# Patient Record
Sex: FEMALE | Race: WHITE | NOT HISPANIC OR LATINO | Employment: FULL TIME | URBAN - METROPOLITAN AREA
[De-identification: names, ages, dates, MRNs, and addresses within clinical notes are randomized per-mention and may not be internally consistent; named-entity substitution may affect disease eponyms.]

---

## 2017-01-17 ENCOUNTER — HOSPITAL ENCOUNTER (OUTPATIENT)
Dept: BONE DENSITY | Facility: HOSPITAL | Age: 52
Discharge: HOME | End: 2017-01-17

## 2017-01-17 ENCOUNTER — HOSPITAL ENCOUNTER (OUTPATIENT)
Dept: MAMMOGRAPHY | Facility: HOSPITAL | Age: 52
Discharge: HOME | End: 2017-01-17
Admitting: NURSE PRACTITIONER

## 2017-01-17 DIAGNOSIS — Z13.820 OSTEOPOROSIS SCREENING: ICD-10-CM

## 2017-01-17 DIAGNOSIS — Z12.31 VISIT FOR SCREENING MAMMOGRAM: ICD-10-CM

## 2017-01-17 PROCEDURE — 77063 BREAST TOMOSYNTHESIS BI: CPT

## 2017-01-17 PROCEDURE — 77063 BREAST TOMOSYNTHESIS BI: CPT | Performed by: RADIOLOGY

## 2017-01-17 PROCEDURE — G0202 SCR MAMMO BI INCL CAD: HCPCS

## 2017-01-17 PROCEDURE — 77067 SCR MAMMO BI INCL CAD: CPT | Performed by: RADIOLOGY

## 2017-01-17 PROCEDURE — 77080 DXA BONE DENSITY AXIAL: CPT

## 2017-01-17 PROCEDURE — 77080 DXA BONE DENSITY AXIAL: CPT | Performed by: RADIOLOGY

## 2017-02-16 ENCOUNTER — TRANSCRIBE ORDERS (OUTPATIENT)
Dept: NUTRITION | Facility: HOSPITAL | Age: 52
End: 2017-02-16

## 2017-02-16 DIAGNOSIS — K58.0 IRRITABLE BOWEL SYNDROME WITH DIARRHEA: Primary | ICD-10-CM

## 2017-03-10 ENCOUNTER — OFFICE VISIT (OUTPATIENT)
Dept: NUTRITION | Facility: HOSPITAL | Age: 52
End: 2017-03-10

## 2017-03-10 PROCEDURE — 97802 MEDICAL NUTRITION INDIV IN: CPT | Performed by: DIETITIAN, REGISTERED

## 2017-03-13 VITALS — WEIGHT: 168 LBS | HEIGHT: 65 IN | BODY MASS INDEX: 27.99 KG/M2

## 2017-03-13 NOTE — CONSULTS
Adult Outpatient Nutrition  Assessment/PES    Patient Name:  Bhargavi Mohr  YOB: 1965  MRN: [id number]    Assessment Date:  3/13/2017          General Info       03/13/17 1014    Today's Session    Person(s) attending today's session Patient     Services Used Today? No    General Information    How well do you speak English? very well    Are you able to read and write English? Yes    Lives With spouse    Last grade of school completed Bachelor's Degree    Name and relationship of caregiver (if applicable)  n/a    Is patient pregnant? no            Physical Findings       03/13/17 1015    Physical Findings/Assessment    Additional Documentation Physical Appearance (Group)    Physical Appearance    Overall Physical Appearance overweight              Nutritional Info/Activity       03/13/17 1031    Nutritional Information    Have you had weight changes? No    What is your desired body weight? 65.8 kg (145 lb)    Have you tried to lose weight before? Yes    List programs tried, date, and success Still losing weight; moderate diet to control diabetes, walking, yoga    What is your motivation to lose weight? control diabetes & overall health    Use of Supplements vitamins;minerals;other (see comments)   Calcium +D, MVI    History of eating disorder? No    What cultural diet influences are important for you to follow? n/a    Do you have difficulty chewing food? No    Who Prepares Meals self    List any food cravings/trigger foods you have chips & salsa, queso    List any food aversions none    How often during the day do you find yourself snacking? 1x/day    Food Behaviors Stress eater    How often do you eat out and where? 2x/week (Sushi, Mexican)    Do you use Food Assistance programs (WIC, food stamps, food bank)? no    Do you need information about Food Assistance programs? no    How many times do you drink milk per day? 0    How many times do you eat fruit per day? 3    How many times do you  eat vegetables per day? 3    How many times do you drink juice per day? 0    How many times do you eat candy/chocolates per day? 0   <1x/day    How many times do you eat baked goods per day? 0   <1x/day    How many times do you eat desserts per day? 0   <1x/day    How many times do you eat ice cream per day? 0    How many times do you eat snack foods per day? 0   <1x/day    How many diet sodas do you drink per day? 0    How many regular sodas do you drink per day? 0    How many times do you eat ethnic food per day? 1    How many times do you drink alcohol per day? --   <1x/day    How many times do you have caffeine per day? 2    How many servings of artificial sweetner do you have per day? 2    How many meals do you eat each day? 3    How many snacks do you eat each day? 1    What is the biggest challenge you have with your diet? Other (comment)   Stress and work - often not enough time to eat healthy    What type of support do you currently use to help you with your health issues? n/a    Enter everything you can remember eating in the last 24 hours (1 day) Breakfast: cream of wheat; turkey sausage, banana, cashew milk; Snack: tomato; Lunch: Grilled Chicken sandwich, potato salad; Dinner: salad (mixed greens, tomato, cucumber, feta cheese, olive oil)    Eating Environment    Eating environment Alone;Family    Physical Activity    Are you currently involved in an activity/exercise program?  Yes    Describe physical activity walking (10,000-14,000 steps per day)    How would you rank exercise as an important health lifestyle practice? 9            Home Nutrition Report       03/13/17 1113    Home Nutrition Report    Diet Healthful diet   Diabetes    Typical Food/Fluid Intake vegetables, lean protein, whole grains, fruit, nuts, dairy free products    Food Preferences healthful diet    Meal/Snack Patterns 3 meals, 1 snack; rarely skips meals    Supplemental Drinks/Foods/Additives none    Use of Vitamin/Mineral/Herbal  "Supplements Multivitamin, Calcium, vitamin D    Factors Affecting Nutritional Intake Factors    Reported GI Symptoms Diarrhea;Other (comment)   bloating            Estimated/Assessed Needs       03/13/17 1126    Calculation Measurements    Weight Used For Calculations 78.5 kg (173 lb)    Height Used for Calculations 1.651 m (5' 5\")    Estimated/Assessed Energy Needs    Energy Need Method Marquette-St Jeor    Age 51    RMR (Marquette-St. Jeor Equation) 1405.6    Activity Factors (Mar[place] St Jeor)  Confined to bed  1.2    Total estimated needs ([place] St. Jeor) 1405 x 1.2 = 1687 - 250 = 1437    Estimated Kcal Range  2201-2540 kcalories/day                Problem/Interventions:        Problem 1       03/13/17 1127    Nutrition Diagnoses Problem 1    Problem 1 Knowledge Deficit    Etiology (related to) Medical Diagnosis    Gastrointestinal IBS    Signs/Symptoms (evidenced by) Reported  Information Deficit   low FODMAP diet                    Intervention Goal       03/13/17 1128    Intervention Goal    General Provide information regarding MNT for treatment/condition   low FODMAP diet for IBS    PO Meet estimated needs   low FOPMAP elimination diet phase 1    Weight Appropriate weight loss              Comments:  Patient describes problems with bloating and diarrhea.  Wants to obtain information on the low FODMAP diet to help reduce IBS symptoms.  States that her MD gave her low FODMAP diet guidelines but found them difficult to follow.  Also, wants to continue to keep her blood sugars under control and continue to lose weight.  Discussed rationale for low FODMAP diet.  Patient able to select foods that are appropriate for elimination phase and create individualized meal plans using the plate method.  The instructional process also included nutrition label reading, meal planning, portion sizes, and restaurant nutrition.  Will continue her current exercise routine. Materials provided include low FODMAP elimination " guidelines, sample menu, recipes, and supporting nutrition education materials.   No barriers to learning.  Newest Vital Sign Health Literacy Assessment completed today. Per health assessment, patient has adequate health literacy.     Goals:  -Follow low FODMAP elimination phase 1 guidelines.  -Lose 0.5# of body weight per week.      Patient was provided with RD's contact information. Follow up visit is scheduled for 4/21/2017 at 3:15 p.m.  Thank you for this referral.      Electronically signed by:  Julieta Aviles RD  03/13/17 11:32 AM

## 2017-04-10 ENCOUNTER — OFFICE VISIT (OUTPATIENT)
Dept: NUTRITION | Facility: HOSPITAL | Age: 52
End: 2017-04-10

## 2017-04-10 VITALS — WEIGHT: 164 LBS | HEIGHT: 65 IN | BODY MASS INDEX: 27.32 KG/M2

## 2017-04-10 NOTE — PROGRESS NOTES
Adult Outpatient Nutrition  Assessment/PES    Patient Name:  Bhargavi Mohr  YOB: 1965  MRN: [id number]    Assessment Date:  4/10/2017                Nutritional Info/Activity       04/10/17 1521    Nutritional Information    Use of Supplements vitamins;minerals;other (see comments)   Calcium +D, MVI    Who Prepares Meals self    Enter everything you can remember eating in the last 24 hours (1 day) Breakfast: Gluten Free toast, fried eggs, 2 slices fernandez; Lunch: Roasted Chicken sandwich on gluten free bread, gonzales, tomato, lettuce; Snack: cheese, baked sweet potato chips; Dinner: crab cakes, wild rice, 1/2 cup green beans        Comments:  Patient attended nutrition counseling appointment.  States that weight is 164 lbs.  Goal completion as follows:    -Follow low FODMAP elimination phase 1 guidelines: 100%  -Lose 0.5# of body weight per week: 100%    Patient states that she followed the low FODMAP elimination guidelines and did not find that they helped her.  States that she has had a diarrhea episode about every 3 days and did not identify any food trends that may have caused these episodes.  Per 24 hour recall, the patient appears to be eating more fat than normal.  Recommended keeping a food diary over the next week to help identify foods that bother her.  Will bring food record to next MD appointment.  Discussed trying either a fat restricted diet or a fiber restricted diet to see if these would help.  Information given.  Briefly discussed low FODMAP challenge guidelines if patient would like to try the challenge phase.  Gave low FODMAP supplement, called ProNourish, as an option to try if she travels as well.  States that she has lost about 4 lbs over the past month even though she has been eating more kcalories.  RD contact information given.  Patient plans to follow up with MD first, then will call RD to schedule continued follow up appointment if needed.  Thank you again for this referral.       Electronically signed by:  Julieta Aviles RD  04/10/17 3:23 PM

## 2017-04-21 ENCOUNTER — APPOINTMENT (OUTPATIENT)
Dept: NUTRITION | Facility: HOSPITAL | Age: 52
End: 2017-04-21

## 2018-03-07 ENCOUNTER — TRANSCRIBE ORDERS (OUTPATIENT)
Dept: ADMINISTRATIVE | Facility: HOSPITAL | Age: 53
End: 2018-03-07

## 2018-03-07 DIAGNOSIS — Z12.31 VISIT FOR SCREENING MAMMOGRAM: Primary | ICD-10-CM

## 2018-04-05 ENCOUNTER — HOSPITAL ENCOUNTER (OUTPATIENT)
Dept: MAMMOGRAPHY | Facility: HOSPITAL | Age: 53
Discharge: HOME | End: 2018-04-05
Admitting: NURSE PRACTITIONER

## 2018-04-05 DIAGNOSIS — Z12.31 VISIT FOR SCREENING MAMMOGRAM: ICD-10-CM

## 2018-04-05 PROCEDURE — 77067 SCR MAMMO BI INCL CAD: CPT

## 2018-04-05 PROCEDURE — 77063 BREAST TOMOSYNTHESIS BI: CPT | Performed by: RADIOLOGY

## 2018-04-05 PROCEDURE — 77063 BREAST TOMOSYNTHESIS BI: CPT

## 2018-04-05 PROCEDURE — 77067 SCR MAMMO BI INCL CAD: CPT | Performed by: RADIOLOGY

## 2018-04-11 ENCOUNTER — HOSPITAL ENCOUNTER (OUTPATIENT)
Dept: ULTRASOUND IMAGING | Facility: HOSPITAL | Age: 53
Discharge: HOME | End: 2018-04-11
Admitting: NURSE PRACTITIONER

## 2018-04-11 DIAGNOSIS — R92.8 ABNORMAL MAMMOGRAM: ICD-10-CM

## 2018-04-11 PROCEDURE — 76642 ULTRASOUND BREAST LIMITED: CPT

## 2018-04-11 PROCEDURE — 76642 ULTRASOUND BREAST LIMITED: CPT | Performed by: RADIOLOGY

## 2018-12-12 ENCOUNTER — TRANSCRIBE ORDERS (OUTPATIENT)
Dept: LAB | Facility: HOSPITAL | Age: 53
End: 2018-12-12

## 2018-12-12 ENCOUNTER — LAB (OUTPATIENT)
Dept: LAB | Facility: HOSPITAL | Age: 53
End: 2018-12-12

## 2018-12-12 DIAGNOSIS — N83.209 CYST OF OVARY, UNSPECIFIED LATERALITY: Primary | ICD-10-CM

## 2018-12-12 DIAGNOSIS — N83.209 CYST OF OVARY, UNSPECIFIED LATERALITY: ICD-10-CM

## 2018-12-12 LAB — CANCER AG125 SERPL QL: 8.2 U/ML (ref 0–30.2)

## 2018-12-12 PROCEDURE — 86304 IMMUNOASSAY TUMOR CA 125: CPT

## 2018-12-12 PROCEDURE — 36415 COLL VENOUS BLD VENIPUNCTURE: CPT

## 2018-12-20 ENCOUNTER — APPOINTMENT (OUTPATIENT)
Dept: PREADMISSION TESTING | Facility: HOSPITAL | Age: 53
End: 2018-12-20

## 2018-12-20 LAB
B-HCG UR QL: NEGATIVE
BACTERIA UR QL AUTO: NORMAL /HPF
BILIRUB UR QL STRIP: NEGATIVE
CLARITY UR: CLEAR
COLOR UR: YELLOW
DEPRECATED RDW RBC AUTO: 43.8 FL (ref 37–54)
ERYTHROCYTE [DISTWIDTH] IN BLOOD BY AUTOMATED COUNT: 13.3 % (ref 11.3–14.5)
GLUCOSE UR STRIP-MCNC: NEGATIVE MG/DL
HCT VFR BLD AUTO: 40.6 % (ref 34.5–44)
HGB BLD-MCNC: 13.3 G/DL (ref 11.5–15.5)
HGB UR QL STRIP.AUTO: NEGATIVE
HYALINE CASTS UR QL AUTO: NORMAL /LPF
KETONES UR QL STRIP: NEGATIVE
LEUKOCYTE ESTERASE UR QL STRIP.AUTO: ABNORMAL
MCH RBC QN AUTO: 29.8 PG (ref 27–31)
MCHC RBC AUTO-ENTMCNC: 32.8 G/DL (ref 32–36)
MCV RBC AUTO: 91 FL (ref 80–99)
NITRITE UR QL STRIP: NEGATIVE
PH UR STRIP.AUTO: 6 [PH] (ref 5–8)
PLATELET # BLD AUTO: 289 10*3/MM3 (ref 150–450)
PMV BLD AUTO: 10.6 FL (ref 6–12)
POTASSIUM BLD-SCNC: 3.9 MMOL/L (ref 3.5–5.5)
PROT UR QL STRIP: NEGATIVE
RBC # BLD AUTO: 4.46 10*6/MM3 (ref 3.89–5.14)
RBC # UR: NORMAL /HPF
REF LAB TEST METHOD: NORMAL
SP GR UR STRIP: 1.01 (ref 1–1.03)
SQUAMOUS #/AREA URNS HPF: NORMAL /HPF
UROBILINOGEN UR QL STRIP: ABNORMAL
WBC NRBC COR # BLD: 10.13 10*3/MM3 (ref 3.5–10.8)
WBC UR QL AUTO: NORMAL /HPF

## 2018-12-20 PROCEDURE — 84132 ASSAY OF SERUM POTASSIUM: CPT | Performed by: OBSTETRICS & GYNECOLOGY

## 2018-12-20 PROCEDURE — 85027 COMPLETE CBC AUTOMATED: CPT | Performed by: OBSTETRICS & GYNECOLOGY

## 2018-12-20 PROCEDURE — 36415 COLL VENOUS BLD VENIPUNCTURE: CPT

## 2018-12-20 PROCEDURE — 93010 ELECTROCARDIOGRAM REPORT: CPT | Performed by: INTERNAL MEDICINE

## 2018-12-20 PROCEDURE — 81001 URINALYSIS AUTO W/SCOPE: CPT | Performed by: OBSTETRICS & GYNECOLOGY

## 2018-12-20 PROCEDURE — 81025 URINE PREGNANCY TEST: CPT | Performed by: OBSTETRICS & GYNECOLOGY

## 2018-12-20 PROCEDURE — 93005 ELECTROCARDIOGRAM TRACING: CPT

## 2018-12-28 ENCOUNTER — LAB REQUISITION (OUTPATIENT)
Dept: LAB | Facility: HOSPITAL | Age: 53
End: 2018-12-28

## 2018-12-28 DIAGNOSIS — N83.291 OTHER OVARIAN CYST, RIGHT SIDE: ICD-10-CM

## 2018-12-28 PROCEDURE — 88305 TISSUE EXAM BY PATHOLOGIST: CPT | Performed by: OBSTETRICS & GYNECOLOGY

## 2018-12-31 LAB
CYTO UR: NORMAL
LAB AP CASE REPORT: NORMAL
LAB AP CLINICAL INFORMATION: NORMAL
PATH REPORT.FINAL DX SPEC: NORMAL
PATH REPORT.GROSS SPEC: NORMAL

## 2019-02-06 ENCOUNTER — LAB (OUTPATIENT)
Dept: LAB | Facility: HOSPITAL | Age: 54
End: 2019-02-06

## 2019-02-06 ENCOUNTER — TRANSCRIBE ORDERS (OUTPATIENT)
Dept: LAB | Facility: HOSPITAL | Age: 54
End: 2019-02-06

## 2019-02-06 DIAGNOSIS — R10.9 ABDOMINAL PAIN, UNSPECIFIED ABDOMINAL LOCATION: Primary | ICD-10-CM

## 2019-02-06 DIAGNOSIS — R10.9 ABDOMINAL PAIN, UNSPECIFIED ABDOMINAL LOCATION: ICD-10-CM

## 2019-02-06 LAB
BASOPHILS # BLD AUTO: 0.03 10*3/MM3 (ref 0–0.2)
BASOPHILS NFR BLD AUTO: 0.3 % (ref 0–1)
BILIRUB UR QL STRIP: NEGATIVE
CLARITY UR: CLEAR
COLOR UR: YELLOW
DEPRECATED RDW RBC AUTO: 44.8 FL (ref 37–54)
EOSINOPHIL # BLD AUTO: 0.4 10*3/MM3 (ref 0–0.3)
EOSINOPHIL NFR BLD AUTO: 4.5 % (ref 0–3)
ERYTHROCYTE [DISTWIDTH] IN BLOOD BY AUTOMATED COUNT: 13.5 % (ref 11.3–14.5)
GLUCOSE UR STRIP-MCNC: NEGATIVE MG/DL
HCT VFR BLD AUTO: 39.5 % (ref 34.5–44)
HGB BLD-MCNC: 12.7 G/DL (ref 11.5–15.5)
HGB UR QL STRIP.AUTO: NEGATIVE
IMM GRANULOCYTES # BLD AUTO: 0.02 10*3/MM3 (ref 0–0.03)
IMM GRANULOCYTES NFR BLD AUTO: 0.2 % (ref 0–0.6)
KETONES UR QL STRIP: NEGATIVE
LEUKOCYTE ESTERASE UR QL STRIP.AUTO: NEGATIVE
LYMPHOCYTES # BLD AUTO: 2.35 10*3/MM3 (ref 0.6–4.8)
LYMPHOCYTES NFR BLD AUTO: 26.3 % (ref 24–44)
MCH RBC QN AUTO: 29.1 PG (ref 27–31)
MCHC RBC AUTO-ENTMCNC: 32.2 G/DL (ref 32–36)
MCV RBC AUTO: 90.4 FL (ref 80–99)
MONOCYTES # BLD AUTO: 0.43 10*3/MM3 (ref 0–1)
MONOCYTES NFR BLD AUTO: 4.8 % (ref 0–12)
NEUTROPHILS # BLD AUTO: 5.72 10*3/MM3 (ref 1.5–8.3)
NEUTROPHILS NFR BLD AUTO: 63.9 % (ref 41–71)
NITRITE UR QL STRIP: NEGATIVE
PH UR STRIP.AUTO: 6 [PH] (ref 5–8)
PLATELET # BLD AUTO: 277 10*3/MM3 (ref 150–450)
PMV BLD AUTO: 10.5 FL (ref 6–12)
PROT UR QL STRIP: NEGATIVE
RBC # BLD AUTO: 4.37 10*6/MM3 (ref 3.89–5.14)
SP GR UR STRIP: 1.01 (ref 1–1.03)
UROBILINOGEN UR QL STRIP: NORMAL
WBC NRBC COR # BLD: 8.95 10*3/MM3 (ref 3.5–10.8)

## 2019-02-06 PROCEDURE — 81003 URINALYSIS AUTO W/O SCOPE: CPT

## 2019-02-06 PROCEDURE — 85025 COMPLETE CBC W/AUTO DIFF WBC: CPT

## 2019-02-06 PROCEDURE — 36415 COLL VENOUS BLD VENIPUNCTURE: CPT

## 2019-04-08 ENCOUNTER — TRANSCRIBE ORDERS (OUTPATIENT)
Dept: ADMINISTRATIVE | Facility: HOSPITAL | Age: 54
End: 2019-04-08

## 2019-04-08 DIAGNOSIS — Z12.31 VISIT FOR SCREENING MAMMOGRAM: Primary | ICD-10-CM

## 2019-05-15 ENCOUNTER — HOSPITAL ENCOUNTER (OUTPATIENT)
Dept: MAMMOGRAPHY | Facility: HOSPITAL | Age: 54
Discharge: HOME | End: 2019-05-15
Admitting: NURSE PRACTITIONER

## 2019-05-15 DIAGNOSIS — Z12.31 VISIT FOR SCREENING MAMMOGRAM: ICD-10-CM

## 2019-05-15 PROCEDURE — 77067 SCR MAMMO BI INCL CAD: CPT | Performed by: RADIOLOGY

## 2019-05-15 PROCEDURE — 77063 BREAST TOMOSYNTHESIS BI: CPT

## 2019-05-15 PROCEDURE — 77063 BREAST TOMOSYNTHESIS BI: CPT | Performed by: RADIOLOGY

## 2019-05-15 PROCEDURE — 77067 SCR MAMMO BI INCL CAD: CPT

## 2020-07-16 ENCOUNTER — OFFICE VISIT (OUTPATIENT)
Dept: GYNECOLOGIC ONCOLOGY | Facility: CLINIC | Age: 55
End: 2020-07-16

## 2020-07-16 VITALS
BODY MASS INDEX: 27.66 KG/M2 | HEIGHT: 65 IN | TEMPERATURE: 99.3 F | DIASTOLIC BLOOD PRESSURE: 70 MMHG | WEIGHT: 166 LBS | HEART RATE: 79 BPM | RESPIRATION RATE: 14 BRPM | SYSTOLIC BLOOD PRESSURE: 154 MMHG | OXYGEN SATURATION: 97 %

## 2020-07-16 DIAGNOSIS — N87.1 DYSPLASIA OF CERVIX, HIGH GRADE CIN 2: Primary | ICD-10-CM

## 2020-07-16 PROCEDURE — 57452 EXAM OF CERVIX W/SCOPE: CPT | Performed by: OBSTETRICS & GYNECOLOGY

## 2020-07-16 PROCEDURE — 99205 OFFICE O/P NEW HI 60 MIN: CPT | Performed by: OBSTETRICS & GYNECOLOGY

## 2020-07-16 RX ORDER — ALPRAZOLAM 0.25 MG/1
0.25 TABLET ORAL 3 TIMES DAILY PRN
COMMUNITY
Start: 2020-06-15 | End: 2022-07-02

## 2020-07-16 RX ORDER — LISINOPRIL AND HYDROCHLOROTHIAZIDE 25; 20 MG/1; MG/1
1 TABLET ORAL EVERY MORNING
COMMUNITY
Start: 2020-07-01 | End: 2022-07-02

## 2020-07-16 RX ORDER — FLUOXETINE HYDROCHLORIDE 20 MG/1
20 CAPSULE ORAL DAILY
COMMUNITY
Start: 2020-07-05

## 2020-07-16 RX ORDER — ESZOPICLONE 3 MG/1
3 TABLET, FILM COATED ORAL NIGHTLY PRN
COMMUNITY
Start: 2020-06-15 | End: 2020-09-11

## 2020-07-16 RX ORDER — NORETHINDRONE ACETATE AND ETHINYL ESTRADIOL, ETHINYL ESTRADIOL AND FERROUS FUMARATE 1MG-10(24)
1 KIT ORAL DAILY
COMMUNITY
Start: 2020-06-24 | End: 2020-08-03 | Stop reason: HOSPADM

## 2020-07-16 RX ORDER — ROSUVASTATIN CALCIUM 40 MG/1
40 TABLET, COATED ORAL DAILY
COMMUNITY
Start: 2020-04-18

## 2020-07-16 RX ORDER — VALACYCLOVIR HYDROCHLORIDE 1 G/1
2000 TABLET, FILM COATED ORAL 2 TIMES DAILY PRN
COMMUNITY
Start: 2020-05-08

## 2020-07-16 NOTE — PROGRESS NOTES
Bhargavi Mohr  [id number]  1965      Reason for visit: Cervical dysplasia    Consultation:  Patient is being seen at the request of Shelly Bauman    History of present illness:  The patient is a 54 y.o. year old female who presents today for treatment and evaluation of the above issues.    Patient has a longstanding history of abnormal Pap smear.  She underwent cervical conization at the age of 30.  She had an ASCUS Pap smear which was negative high risk 16/18, but patient underwent colposcopy with directed biopsies and subsequently saw Shelly Bauman to discuss the results.  Endocervical curetting showed at least high-grade squamous intraepithelial lesion (EDMUND-2) and cervical biopsy at 9:00 showed a low-grade squamous intraepithelial lesion (EDMUND-1 N1).  In the comments that said that the findings correlated with the patient's history of abnormal Pap smear.  At noted evaluation of the endocervical curettage was limited by scant tissue and tissue fragmentation and a more serious lesion could not be entirely excluded.    Today, patient notes no symptoms.  She is postmenopausal as of the age 46.  She is a never smoker.  She has questions about definitive management.    For new patients, [facility] intake form from today was reviewed and confirmed.    OBGYN History:  She is a .  She does use HRT. She does have a history of abnormal pap smears.      Oncologic History:   No history exists.         Past Medical History:   Diagnosis Date   • Anxiety    • Diabetes (CMS/HCC)    • Diverticulitis    • History of right salpingo-oophorectomy 2018   • Hypertension    • Melanoma (CMS/HCC)    • Positive TB test        Past Surgical History:   Procedure Laterality Date   • APPENDECTOMY     • CERVICAL CONIZATION     • SKIN CANCER EXCISION      melanoma   • TONSILLECTOMY AND ADENOIDECTOMY         MEDICATIONS: The current medication list was reviewed with the patient and updated in the EMR this date per the Medical Assistant.  Medication dosages and frequencies were confirmed to be accurate.      Allergies:  is allergic to codeine and percocet [oxycodone-acetaminophen].    Social History:   Social History     Socioeconomic History   • Marital status:      Spouse name: Not on file   • Number of children: 2   • Years of education: Not on file   • Highest education level: Not on file   Tobacco Use   • Smoking status: Former Smoker     Packs/day: 1.00     Years: 20.00     Pack years: 20.00   Substance and Sexual Activity   • Drug use: Never   • Sexual activity: Defer       Family History:    Family History   Problem Relation Age of Onset   • Heart disease Mother    • Colon cancer Father    • Breast cancer Neg Hx    • Ovarian cancer Neg Hx        Health Maintenance:    Health Maintenance   Topic Date Due   • ANNUAL PHYSICAL  08/23/1969   • TDAP/TD VACCINES (1 - Tdap) 08/23/1977   • ZOSTER VACCINE (1 of 2) 08/23/2016   • HEPATITIS C SCREENING  12/20/2018   • PAP SMEAR  12/20/2018   • COLONOSCOPY  12/20/2018   • INFLUENZA VACCINE  08/01/2020   • MAMMOGRAM  05/15/2021     Review of Systems   Constitutional: Negative for appetite change, chills, fatigue, fever and unexpected weight change.   HENT: Negative for congestion, hearing loss and sore throat.    Eyes: Negative for redness and visual disturbance.   Respiratory: Negative for cough, shortness of breath and wheezing.    Cardiovascular: Negative for chest pain, palpitations and leg swelling.   Gastrointestinal: Negative for abdominal distention, abdominal pain, blood in stool, constipation, diarrhea, nausea and vomiting.   Endocrine: Negative.  Negative for cold intolerance and heat intolerance (Hot flashes).   Genitourinary: Negative for difficulty urinating, dyspareunia, dysuria, frequency, genital sores, hematuria, pelvic pain, urgency, vaginal bleeding, vaginal discharge and vaginal pain.   Musculoskeletal: Negative for arthralgias, back pain, gait problem and joint swelling.  "  Skin: Negative for rash and wound.   Allergic/Immunologic: Negative for food allergies and immunocompromised state.   Neurological: Negative for dizziness, seizures, syncope, weakness, light-headedness, numbness and headaches.   Hematological: Negative for adenopathy. Does not bruise/bleed easily.   Psychiatric/Behavioral: Positive for dysphoric mood and sleep disturbance. Negative for confusion. The patient is nervous/anxious.      Physical Exam    Vitals:    07/16/20 1259   BP: 154/70   Pulse: 79   Resp: 14   Temp: 99.3 °F (37.4 °C)   TempSrc: Temporal   SpO2: 97%   Weight: 75.3 kg (166 lb)   Height: 165.1 cm (65\")   PainSc: 0-No pain       Body mass index is 27.62 kg/m².    Wt Readings from Last 3 Encounters:   07/16/20 75.3 kg (166 lb)   04/10/17 74.4 kg (164 lb)   03/13/17 76.2 kg (168 lb)         GENERAL: Alert, well-appearing female appearing her stated age who is in no apparent distress.   HEENT: Sclera anicteric. Head normocephalic, atraumatic. Mucus membranes moist.   NECK: Trachea midline, supple, without masses.  No thyromegaly.   BREASTS: Deferred  CARDIOVASCULAR: Normal rate, regular rhythm, no murmurs, rubs, or gallops.  No peripheral edema.  RESPIRATORY: Clear to auscultation bilaterally, normal respiratory effort  BACK:  No CVA tenderness, no vertebral tenderness on palpation  GASTROINTESTINAL:  Abdomen is soft, non-tender, non-distended, no rebound or guarding, no masses, or hernias. No HSM.   SKIN:  Warm, dry, well-perfused.  All visible areas intact.  No rashes, lesions, ulcers.  PSYCHIATRIC: AO x3, with appropriate affect, normal thought processes.  NEUROLOGIC: No focal deficits.  Moves extremities well.  MUSCULOSKELETAL: Normal gait and station.   EXTREMITIES:   No cyanosis, clubbing, symmetric.  LYMPHATICS:  No cervical or inguinal adenopathy noted.     PELVIC exam:    External genitalia are free from lesion. On speculum examination, the cervix was free from lesion. On bimanual examination " no mass was appreciated.  Uterus was normal in size and shape. There is no cervical motion or uterine tenderness. No cervical mass was palpated. Parametria were smooth. Rectovaginal exam was deferred.     ECOG PS 0    PROCEDURES:  After obtaining informed consent, colposcopy was performed of cervix and vagina.  3% acetic acid was applied.  Colposcopy was adequate.  Findings were remarkable for acetowhite changes and focal mosaicism at cervical os.  Biopsy was not performed.  Adequate hemostasis was noted at the end of the procedure.  Procedure was well-tolerated.      Diagnostic Data:      No Images in the past 120 days found..    Lab Results   Component Value Date    WBC 8.95 02/06/2019    HGB 12.7 02/06/2019    HCT 39.5 02/06/2019    MCV 90.4 02/06/2019     02/06/2019    NEUTROABS 5.72 02/06/2019    K 3.9 12/20/2018     Lab Results   Component Value Date     8.2 12/12/2018           Assessment/Plan   This is a 54 y.o. woman with remote history of cervical dysplasia requiring cold knife conization, now with recurrent abnormal Pap smear biopsy-proven EDMUND-2.  Prior right salpingo-oophorectomy.  Encounter Diagnosis   Name Primary?   • Dysplasia of cervix, high grade EDMUND 2 Yes     Options of repeat cervical conization versus definitive management with hysterectomy were discussed.  Patient strongly desires definitive management.  Patient was consented for robotic assisted total laparoscopic hysterectomy, left salpingo-oophorectomy.      Patient has history of anesthesia induced nausea and vomiting.  She has history of narcotic induced nausea and intolerance.  Ultram was discussed as a postoperative pain medication.    Risks and benefits of surgery were discussed.  This included, but was not limited to, infection and bleeding like when the skin is cut; damage to surrounding structures; and incisional complications.  Risk of DVT was addressed for major surgeries.  Standard of care efforts to minimize these  risks were reviewed.  Typical hospital stay and recovery were discussed as well as post-procedure precautions.  Surgical implications of chronic illnesses on recovery and surgical outcome were reviewed.     Pain medication regimen for postoperative care was discussed.  Typical regimen and avoidance of narcotics was discussed.  Patient was educated that other factors, such as existing narcotic use, can impact postoperative pain management.      Patient verbalized understanding of the plan including the risks and benefits.  Appropriate perioperative testing including laboratory evaluation, EKG as clinically indicated, chest x-ray as clinically indicated, and preadmission evaluation were all ordered as a part of this patient's care.    Pain assessment was performed today as a part of patient’s care.  For patients with pain related to surgery, gynecologic malignancy or cancer treatment, the plan is as noted in the assessment/plan.  For patients with pain not related to these issues, they are to seek any further needed care from a more appropriate provider, such as PCP.      No orders of the defined types were placed in this encounter.      FOLLOW UP: Surgery    Electronically Signed by: Ne Marmolejo MD  Date: 7/16/2020

## 2020-07-21 DIAGNOSIS — N87.1 DYSPLASIA OF CERVIX, HIGH GRADE CIN 2: Primary | ICD-10-CM

## 2020-07-21 RX ORDER — CELECOXIB 100 MG/1
200 CAPSULE ORAL
Status: CANCELLED | OUTPATIENT
Start: 2020-07-21

## 2020-07-21 NOTE — PROGRESS NOTES
Laparoscopic Surgery Instructions            Bhargavi Mohr  [id number]  1965      SURGEON:  Ne Marmolejo MD    Surgery Coordinator: Graciela Bauer  If you have any questions before or after surgery please call.  [phone]       Appointment    1. You have COVID testing on 07/31/2020 at 3:00 PM. This is located at [address] on the corner of [facility]. It is a drive through [facility].    2. You have pre-admission testing (PAT) appointment on 07/31/2020  at 3:30 PM.  You will need to be at hospital registration 10 minutes before that time. The registration department is located in the long hallway between the [address] buildings.If your PAT appointment is on Sunday, please enter through the Emergency Department.     3.  Your surgery has been scheduled on 08/03/2020.  You will need to be at the [address] [place] [address] second floor surgery registration on that day at 5:45 AM. .    The Day(s) Before Surgery     ·  Nothing by mouth after midnight on 08/02/2020.    · Plan to have someone take you home from the hospital.    · Do not use any products that contain nicotine or tobacco, such as cigarettes and e-cigarettes. These can delay healing after surgery. If you need help quitting, ask your health care provider.    ·  Do not take vitamins or aspirin one week before surgery ( if applicable).  On the morning of your surgery, you may take you prescription medications with a sip of water, unless told otherwise by your provider.  Bring them with you to the hospital (Diabetic patient should bring insulin if instructed by the managing physician). If you are taking a blood thinner ( Eliquis, Coumadin, Xarelto, Lovenox, Asprin, Heparin, etc.) please have the provider that manages this instruct you on when to stop taking prior to surgery.     · If you are feeling sick, have a fever or cough and have seen your PCP let our office know 48 hours prior to surgery. It may be subject to rescheduling.             What can I expect after the procedure?    A normal length of stay for laparoscopic procedures can be same day or up to 23 hours.     After the procedure, it is common to have:  · Pain.  · Soreness and numbness in your incision areas.  · Vaginal bleeding and discharge up to 6 weeks after surgery.  · Constipation.  · Temporary change in bladder function.  · Feelings of sadness or other emotions.  · Small amounts of clear drainage from incisions  · If you are discharged with an abdominal binder, this is to be used as needed for incisional comfort. You may choose to discontinue use at any time.      Follow these instructions at home:  Medicines    · Please take any medications that have been prescribed after your surgery, you may take over the counter Tylenol and Ibuprofen, unless told otherwise by your provider.   · Please take your stool softener as prescribed. If you do not have a bowel movement within 24 hours following surgery try a laxative (milk of magnesia) or you may take Juani-Lax.    Activity    · Return to your normal activities as told by your health care provider.   · You may be told to take short walks every day and go farther each time.  · Do not lift anything that is heavier than 20 lbs.      General instructions    · Do not put anything in your vagina for 6 weeks after your surgery or as told by your health care provider. This includes tampons and douches.  · Do not have sex until your health care provider says you can.  · Do not take baths, swim, or use a hot tub until your health care provider approves.  · Drink enough fluid to keep your urine clear or pale yellow.  · Do not drive for 24 hours if you were given a sedative.  · Do not drive while taking Narcotic Pain medication ( oxycodone, hydrocodone, etc.).   · Keep all follow-up visits as told by your health care provider. This is important. You will have a post op appointment typically 3 weeks after surgery.  · Make sure you are urinating on a  scheduled basis, for example every 2 hours. This will help retrain your bladder after surgery and prevent urinary tract infections.     Contact a health care provider if:    · Your pain medicine is not helping.  · You have a fever over 101.0  · You have redness, swelling, or pain at your incision site.  · You continue to have difficulty urinating.  · You have not had a bowel movement 2-3 days after surgery, experience nausea and vomiting, are unable to pass gas.   · Large volumes of drainage or blood from incisions, requiring multiple guaze changes.     Get help right away if:    · You have severe abdominal or back pain that is not controlled with medication.  · You have heavy bleeding from your vagina that saturates a maxi pad within 1-2 hours. Note- vaginal bleeding and spotting is normal up to 6 weeks from a hysterectomy, Heavy Bleeding is not.     If you experience a medical emergency call 911 or have someone drive you to your nearest emergency department.

## 2020-07-28 ENCOUNTER — TELEPHONE (OUTPATIENT)
Dept: GYNECOLOGIC ONCOLOGY | Facility: CLINIC | Age: 55
End: 2020-07-28

## 2020-07-28 NOTE — TELEPHONE ENCOUNTER
Phoned back Michelle at [facility]. Additional information given. They will contact us or fax letter. Call back number [phone] or [phone]

## 2020-07-28 NOTE — TELEPHONE ENCOUNTER
[employer] [place] called to give us a denial on the Pre Certification of the pt upcoming surgery due to lack of information received.

## 2020-07-31 ENCOUNTER — HOSPITAL ENCOUNTER (OUTPATIENT)
Dept: GENERAL RADIOLOGY | Facility: HOSPITAL | Age: 55
Discharge: HOME | End: 2020-07-31
Admitting: OBSTETRICS & GYNECOLOGY

## 2020-07-31 ENCOUNTER — ANESTHESIA EVENT (OUTPATIENT)
Dept: PERIOP | Facility: HOSPITAL | Age: 55
End: 2020-07-31

## 2020-07-31 ENCOUNTER — APPOINTMENT (OUTPATIENT)
Dept: PREADMISSION TESTING | Facility: HOSPITAL | Age: 55
End: 2020-07-31

## 2020-07-31 VITALS — WEIGHT: 166.01 LBS | HEIGHT: 65 IN | BODY MASS INDEX: 27.66 KG/M2

## 2020-07-31 DIAGNOSIS — N87.1 DYSPLASIA OF CERVIX, HIGH GRADE CIN 2: ICD-10-CM

## 2020-07-31 LAB
ABO GROUP BLD: NORMAL
ALBUMIN SERPL-MCNC: 4.5 G/DL (ref 3.5–5.2)
ALBUMIN/GLOB SERPL: 1.9 G/DL
ALP SERPL-CCNC: 45 U/L (ref 39–117)
ALT SERPL W P-5'-P-CCNC: 17 U/L (ref 1–33)
ANION GAP SERPL CALCULATED.3IONS-SCNC: 10 MMOL/L (ref 5–15)
AST SERPL-CCNC: 16 U/L (ref 1–32)
BASOPHILS # BLD AUTO: 0.05 10*3/MM3 (ref 0–0.2)
BASOPHILS NFR BLD AUTO: 0.6 % (ref 0–1.5)
BILIRUB SERPL-MCNC: 0.2 MG/DL (ref 0–1.2)
BLD GP AB SCN SERPL QL: NEGATIVE
BUN SERPL-MCNC: 15 MG/DL (ref 6–20)
BUN/CREAT SERPL: 20.5 (ref 7–25)
CALCIUM SPEC-SCNC: 9.8 MG/DL (ref 8.6–10.5)
CHLORIDE SERPL-SCNC: 101 MMOL/L (ref 98–107)
CO2 SERPL-SCNC: 28 MMOL/L (ref 22–29)
CREAT SERPL-MCNC: 0.73 MG/DL (ref 0.57–1)
DEPRECATED RDW RBC AUTO: 45.5 FL (ref 37–54)
EOSINOPHIL # BLD AUTO: 0.23 10*3/MM3 (ref 0–0.4)
EOSINOPHIL NFR BLD AUTO: 2.8 % (ref 0.3–6.2)
ERYTHROCYTE [DISTWIDTH] IN BLOOD BY AUTOMATED COUNT: 13.2 % (ref 12.3–15.4)
GFR SERPL CREATININE-BSD FRML MDRD: 83 ML/MIN/1.73
GLOBULIN UR ELPH-MCNC: 2.4 GM/DL
GLUCOSE SERPL-MCNC: 89 MG/DL (ref 65–99)
HBA1C MFR BLD: 6.2 % (ref 4.8–5.6)
HCT VFR BLD AUTO: 40.3 % (ref 34–46.6)
HGB BLD-MCNC: 13.1 G/DL (ref 12–15.9)
IMM GRANULOCYTES # BLD AUTO: 0.04 10*3/MM3 (ref 0–0.05)
IMM GRANULOCYTES NFR BLD AUTO: 0.5 % (ref 0–0.5)
LYMPHOCYTES # BLD AUTO: 1.84 10*3/MM3 (ref 0.7–3.1)
LYMPHOCYTES NFR BLD AUTO: 22.5 % (ref 19.6–45.3)
MCH RBC QN AUTO: 30.5 PG (ref 26.6–33)
MCHC RBC AUTO-ENTMCNC: 32.5 G/DL (ref 31.5–35.7)
MCV RBC AUTO: 93.7 FL (ref 79–97)
MONOCYTES # BLD AUTO: 0.49 10*3/MM3 (ref 0.1–0.9)
MONOCYTES NFR BLD AUTO: 6 % (ref 5–12)
NEUTROPHILS NFR BLD AUTO: 5.51 10*3/MM3 (ref 1.7–7)
NEUTROPHILS NFR BLD AUTO: 67.6 % (ref 42.7–76)
NRBC BLD AUTO-RTO: 0 /100 WBC (ref 0–0.2)
PLATELET # BLD AUTO: 266 10*3/MM3 (ref 140–450)
PMV BLD AUTO: 10.2 FL (ref 6–12)
POTASSIUM SERPL-SCNC: 3.8 MMOL/L (ref 3.5–5.2)
PROT SERPL-MCNC: 6.9 G/DL (ref 6–8.5)
RBC # BLD AUTO: 4.3 10*6/MM3 (ref 3.77–5.28)
RH BLD: POSITIVE
SODIUM SERPL-SCNC: 139 MMOL/L (ref 136–145)
T&S EXPIRATION DATE: NORMAL
WBC # BLD AUTO: 8.16 10*3/MM3 (ref 3.4–10.8)

## 2020-07-31 PROCEDURE — C9803 HOPD COVID-19 SPEC COLLECT: HCPCS

## 2020-07-31 PROCEDURE — 86901 BLOOD TYPING SEROLOGIC RH(D): CPT | Performed by: OBSTETRICS & GYNECOLOGY

## 2020-07-31 PROCEDURE — 36415 COLL VENOUS BLD VENIPUNCTURE: CPT

## 2020-07-31 PROCEDURE — 86850 RBC ANTIBODY SCREEN: CPT | Performed by: OBSTETRICS & GYNECOLOGY

## 2020-07-31 PROCEDURE — 71046 X-RAY EXAM CHEST 2 VIEWS: CPT

## 2020-07-31 PROCEDURE — 85025 COMPLETE CBC W/AUTO DIFF WBC: CPT | Performed by: OBSTETRICS & GYNECOLOGY

## 2020-07-31 PROCEDURE — 80053 COMPREHEN METABOLIC PANEL: CPT | Performed by: OBSTETRICS & GYNECOLOGY

## 2020-07-31 PROCEDURE — 83036 HEMOGLOBIN GLYCOSYLATED A1C: CPT | Performed by: OBSTETRICS & GYNECOLOGY

## 2020-07-31 PROCEDURE — U0004 COV-19 TEST NON-CDC HGH THRU: HCPCS

## 2020-07-31 PROCEDURE — 86900 BLOOD TYPING SEROLOGIC ABO: CPT | Performed by: OBSTETRICS & GYNECOLOGY

## 2020-07-31 PROCEDURE — 93010 ELECTROCARDIOGRAM REPORT: CPT | Performed by: INTERNAL MEDICINE

## 2020-07-31 PROCEDURE — 93005 ELECTROCARDIOGRAM TRACING: CPT

## 2020-07-31 PROCEDURE — U0002 COVID-19 LAB TEST NON-CDC: HCPCS

## 2020-07-31 RX ORDER — FAMOTIDINE 10 MG/ML
20 INJECTION, SOLUTION INTRAVENOUS ONCE
Status: CANCELLED | OUTPATIENT
Start: 2020-07-31 | End: 2020-07-31

## 2020-08-01 LAB
REF LAB TEST METHOD: NORMAL
SARS-COV-2 RNA RESP QL NAA+PROBE: NOT DETECTED

## 2020-08-03 ENCOUNTER — HOSPITAL ENCOUNTER (OUTPATIENT)
Facility: HOSPITAL | Age: 55
Discharge: HOME | End: 2020-08-03
Attending: OBSTETRICS & GYNECOLOGY | Admitting: ANESTHESIOLOGY

## 2020-08-03 ENCOUNTER — ANESTHESIA (OUTPATIENT)
Dept: PERIOP | Facility: HOSPITAL | Age: 55
End: 2020-08-03

## 2020-08-03 VITALS
RESPIRATION RATE: 16 BRPM | BODY MASS INDEX: 27.66 KG/M2 | HEART RATE: 78 BPM | DIASTOLIC BLOOD PRESSURE: 66 MMHG | WEIGHT: 166 LBS | TEMPERATURE: 97.5 F | HEIGHT: 65 IN | SYSTOLIC BLOOD PRESSURE: 130 MMHG | OXYGEN SATURATION: 95 %

## 2020-08-03 DIAGNOSIS — N87.1 DYSPLASIA OF CERVIX, HIGH GRADE CIN 2: Primary | ICD-10-CM

## 2020-08-03 LAB
ABO GROUP BLD: NORMAL
B-HCG UR QL: NEGATIVE
GLUCOSE BLDC GLUCOMTR-MCNC: 129 MG/DL (ref 70–130)
GLUCOSE BLDC GLUCOMTR-MCNC: 202 MG/DL (ref 70–130)
INTERNAL NEGATIVE CONTROL: NEGATIVE
INTERNAL POSITIVE CONTROL: POSITIVE
Lab: NORMAL
RH BLD: POSITIVE

## 2020-08-03 PROCEDURE — 58571 TLH W/T/O 250 G OR LESS: CPT | Performed by: OBSTETRICS & GYNECOLOGY

## 2020-08-03 PROCEDURE — 25010000003 CEFAZOLIN IN DEXTROSE 2-4 GM/100ML-% SOLUTION: Performed by: OBSTETRICS & GYNECOLOGY

## 2020-08-03 PROCEDURE — 86901 BLOOD TYPING SEROLOGIC RH(D): CPT

## 2020-08-03 PROCEDURE — 82962 GLUCOSE BLOOD TEST: CPT

## 2020-08-03 PROCEDURE — G0378 HOSPITAL OBSERVATION PER HR: HCPCS

## 2020-08-03 PROCEDURE — 25010000002 FENTANYL CITRATE (PF) 100 MCG/2ML SOLUTION: Performed by: NURSE ANESTHETIST, CERTIFIED REGISTERED

## 2020-08-03 PROCEDURE — 25010000002 ONDANSETRON PER 1 MG: Performed by: NURSE ANESTHETIST, CERTIFIED REGISTERED

## 2020-08-03 PROCEDURE — 25010000002 PROPOFOL 10 MG/ML EMULSION: Performed by: NURSE ANESTHETIST, CERTIFIED REGISTERED

## 2020-08-03 PROCEDURE — 25010000002 DEXAMETHASONE PER 1 MG: Performed by: NURSE ANESTHETIST, CERTIFIED REGISTERED

## 2020-08-03 PROCEDURE — 88307 TISSUE EXAM BY PATHOLOGIST: CPT | Performed by: OBSTETRICS & GYNECOLOGY

## 2020-08-03 PROCEDURE — 86900 BLOOD TYPING SEROLOGIC ABO: CPT

## 2020-08-03 PROCEDURE — 81025 URINE PREGNANCY TEST: CPT | Performed by: ANESTHESIOLOGY

## 2020-08-03 PROCEDURE — 25010000003 LIDOCAINE 1 % SOLUTION: Performed by: NURSE ANESTHETIST, CERTIFIED REGISTERED

## 2020-08-03 RX ORDER — IBUPROFEN 600 MG/1
600 TABLET ORAL EVERY 6 HOURS PRN
Status: DISCONTINUED | OUTPATIENT
Start: 2020-08-03 | End: 2020-08-03 | Stop reason: HOSPADM

## 2020-08-03 RX ORDER — ACETAMINOPHEN 325 MG/1
650 TABLET ORAL EVERY 6 HOURS PRN
Qty: 60 TABLET | Refills: 2 | Status: SHIPPED | OUTPATIENT
Start: 2020-08-03

## 2020-08-03 RX ORDER — SODIUM CHLORIDE 0.9 % (FLUSH) 0.9 %
10 SYRINGE (ML) INJECTION AS NEEDED
Status: DISCONTINUED | OUTPATIENT
Start: 2020-08-03 | End: 2020-08-03 | Stop reason: HOSPADM

## 2020-08-03 RX ORDER — FENTANYL CITRATE 50 UG/ML
INJECTION, SOLUTION INTRAMUSCULAR; INTRAVENOUS AS NEEDED
Status: DISCONTINUED | OUTPATIENT
Start: 2020-08-03 | End: 2020-08-03 | Stop reason: SURG

## 2020-08-03 RX ORDER — DOCUSATE SODIUM 100 MG/1
200 CAPSULE, LIQUID FILLED ORAL 2 TIMES DAILY PRN
Status: DISCONTINUED | OUTPATIENT
Start: 2020-08-03 | End: 2020-08-03 | Stop reason: HOSPADM

## 2020-08-03 RX ORDER — GLYCOPYRROLATE 0.2 MG/ML
INJECTION INTRAMUSCULAR; INTRAVENOUS AS NEEDED
Status: DISCONTINUED | OUTPATIENT
Start: 2020-08-03 | End: 2020-08-03 | Stop reason: SURG

## 2020-08-03 RX ORDER — CEFAZOLIN SODIUM 2 G/100ML
2 INJECTION, SOLUTION INTRAVENOUS ONCE
Status: COMPLETED | OUTPATIENT
Start: 2020-08-03 | End: 2020-08-03

## 2020-08-03 RX ORDER — SCOLOPAMINE TRANSDERMAL SYSTEM 1 MG/1
1 PATCH, EXTENDED RELEASE TRANSDERMAL ONCE
Qty: 1 PATCH | Refills: 0 | Status: SHIPPED | OUTPATIENT
Start: 2020-08-03 | End: 2020-08-03

## 2020-08-03 RX ORDER — IBUPROFEN 600 MG/1
600 TABLET ORAL EVERY 6 HOURS PRN
Qty: 30 TABLET | Refills: 1 | Status: SHIPPED | OUTPATIENT
Start: 2020-08-03 | End: 2020-08-20

## 2020-08-03 RX ORDER — PROMETHAZINE HYDROCHLORIDE 25 MG/ML
6.25 INJECTION, SOLUTION INTRAMUSCULAR; INTRAVENOUS ONCE AS NEEDED
Status: DISCONTINUED | OUTPATIENT
Start: 2020-08-03 | End: 2020-08-03 | Stop reason: HOSPADM

## 2020-08-03 RX ORDER — TRAMADOL HYDROCHLORIDE 50 MG/1
50 TABLET ORAL EVERY 6 HOURS PRN
Qty: 10 TABLET | Refills: 0 | Status: SHIPPED | OUTPATIENT
Start: 2020-08-03 | End: 2020-08-13

## 2020-08-03 RX ORDER — ONDANSETRON 2 MG/ML
INJECTION INTRAMUSCULAR; INTRAVENOUS AS NEEDED
Status: DISCONTINUED | OUTPATIENT
Start: 2020-08-03 | End: 2020-08-03 | Stop reason: SURG

## 2020-08-03 RX ORDER — CELECOXIB 200 MG/1
200 CAPSULE ORAL
Status: COMPLETED | OUTPATIENT
Start: 2020-08-03 | End: 2020-08-03

## 2020-08-03 RX ORDER — SODIUM CHLORIDE 0.9 % (FLUSH) 0.9 %
10 SYRINGE (ML) INJECTION EVERY 12 HOURS SCHEDULED
Status: DISCONTINUED | OUTPATIENT
Start: 2020-08-03 | End: 2020-08-03 | Stop reason: HOSPADM

## 2020-08-03 RX ORDER — ONDANSETRON 2 MG/ML
4 INJECTION INTRAMUSCULAR; INTRAVENOUS ONCE AS NEEDED
Status: DISCONTINUED | OUTPATIENT
Start: 2020-08-03 | End: 2020-08-03 | Stop reason: HOSPADM

## 2020-08-03 RX ORDER — ACETAMINOPHEN 325 MG/1
650 TABLET ORAL EVERY 6 HOURS PRN
Status: DISCONTINUED | OUTPATIENT
Start: 2020-08-03 | End: 2020-08-03 | Stop reason: HOSPADM

## 2020-08-03 RX ORDER — PSEUDOEPHEDRINE HCL 30 MG
250 TABLET ORAL 2 TIMES DAILY PRN
Qty: 60 EACH | Refills: 3 | Status: SHIPPED | OUTPATIENT
Start: 2020-08-03 | End: 2020-08-20

## 2020-08-03 RX ORDER — ONDANSETRON 4 MG/1
4 TABLET, FILM COATED ORAL EVERY 6 HOURS PRN
Qty: 10 TABLET | Refills: 0 | Status: SHIPPED | OUTPATIENT
Start: 2020-08-03 | End: 2020-09-11

## 2020-08-03 RX ORDER — ONDANSETRON 4 MG/1
4 TABLET, FILM COATED ORAL EVERY 6 HOURS PRN
Status: DISCONTINUED | OUTPATIENT
Start: 2020-08-03 | End: 2020-08-03 | Stop reason: HOSPADM

## 2020-08-03 RX ORDER — FAMOTIDINE 20 MG/1
20 TABLET, FILM COATED ORAL ONCE
Status: DISCONTINUED | OUTPATIENT
Start: 2020-08-03 | End: 2020-08-03 | Stop reason: HOSPADM

## 2020-08-03 RX ORDER — DEXAMETHASONE SODIUM PHOSPHATE 4 MG/ML
INJECTION, SOLUTION INTRA-ARTICULAR; INTRALESIONAL; INTRAMUSCULAR; INTRAVENOUS; SOFT TISSUE AS NEEDED
Status: DISCONTINUED | OUTPATIENT
Start: 2020-08-03 | End: 2020-08-03 | Stop reason: SURG

## 2020-08-03 RX ORDER — ROCURONIUM BROMIDE 10 MG/ML
INJECTION, SOLUTION INTRAVENOUS AS NEEDED
Status: DISCONTINUED | OUTPATIENT
Start: 2020-08-03 | End: 2020-08-03 | Stop reason: SURG

## 2020-08-03 RX ORDER — SODIUM CHLORIDE, SODIUM LACTATE, POTASSIUM CHLORIDE, CALCIUM CHLORIDE 600; 310; 30; 20 MG/100ML; MG/100ML; MG/100ML; MG/100ML
9 INJECTION, SOLUTION INTRAVENOUS CONTINUOUS
Status: DISCONTINUED | OUTPATIENT
Start: 2020-08-03 | End: 2020-08-03 | Stop reason: HOSPADM

## 2020-08-03 RX ORDER — FENTANYL CITRATE 50 UG/ML
50 INJECTION, SOLUTION INTRAMUSCULAR; INTRAVENOUS
Status: DISCONTINUED | OUTPATIENT
Start: 2020-08-03 | End: 2020-08-03 | Stop reason: HOSPADM

## 2020-08-03 RX ORDER — PROPOFOL 10 MG/ML
VIAL (ML) INTRAVENOUS AS NEEDED
Status: DISCONTINUED | OUTPATIENT
Start: 2020-08-03 | End: 2020-08-03 | Stop reason: SURG

## 2020-08-03 RX ORDER — LIDOCAINE HYDROCHLORIDE 10 MG/ML
0.5 INJECTION, SOLUTION EPIDURAL; INFILTRATION; INTRACAUDAL; PERINEURAL ONCE AS NEEDED
Status: COMPLETED | OUTPATIENT
Start: 2020-08-03 | End: 2020-08-03

## 2020-08-03 RX ORDER — TRAMADOL HYDROCHLORIDE 50 MG/1
50 TABLET ORAL EVERY 6 HOURS PRN
Status: DISCONTINUED | OUTPATIENT
Start: 2020-08-03 | End: 2020-08-03 | Stop reason: HOSPADM

## 2020-08-03 RX ORDER — PROMETHAZINE HYDROCHLORIDE 25 MG/1
25 TABLET ORAL ONCE AS NEEDED
Status: DISCONTINUED | OUTPATIENT
Start: 2020-08-03 | End: 2020-08-03 | Stop reason: HOSPADM

## 2020-08-03 RX ORDER — PROMETHAZINE HYDROCHLORIDE 25 MG/1
25 SUPPOSITORY RECTAL ONCE AS NEEDED
Status: DISCONTINUED | OUTPATIENT
Start: 2020-08-03 | End: 2020-08-03 | Stop reason: HOSPADM

## 2020-08-03 RX ORDER — MAGNESIUM HYDROXIDE 1200 MG/15ML
LIQUID ORAL AS NEEDED
Status: DISCONTINUED | OUTPATIENT
Start: 2020-08-03 | End: 2020-08-03 | Stop reason: HOSPADM

## 2020-08-03 RX ORDER — BUPIVACAINE HYDROCHLORIDE AND EPINEPHRINE 5; 5 MG/ML; UG/ML
INJECTION, SOLUTION PERINEURAL AS NEEDED
Status: DISCONTINUED | OUTPATIENT
Start: 2020-08-03 | End: 2020-08-03 | Stop reason: HOSPADM

## 2020-08-03 RX ORDER — LIDOCAINE HYDROCHLORIDE 10 MG/ML
INJECTION, SOLUTION INFILTRATION; PERINEURAL AS NEEDED
Status: DISCONTINUED | OUTPATIENT
Start: 2020-08-03 | End: 2020-08-03 | Stop reason: SURG

## 2020-08-03 RX ORDER — SCOLOPAMINE TRANSDERMAL SYSTEM 1 MG/1
1 PATCH, EXTENDED RELEASE TRANSDERMAL ONCE
Status: DISCONTINUED | OUTPATIENT
Start: 2020-08-03 | End: 2020-08-03 | Stop reason: HOSPADM

## 2020-08-03 RX ADMIN — FENTANYL CITRATE 50 MCG: 0.05 INJECTION, SOLUTION INTRAMUSCULAR; INTRAVENOUS at 09:30

## 2020-08-03 RX ADMIN — PROPOFOL 200 MG: 10 INJECTION, EMULSION INTRAVENOUS at 07:33

## 2020-08-03 RX ADMIN — GLYCOPYRROLATE 0.1 MG: 0.2 INJECTION INTRAMUSCULAR; INTRAVENOUS at 07:55

## 2020-08-03 RX ADMIN — CEFAZOLIN SODIUM 2 G: 2 INJECTION, SOLUTION INTRAVENOUS at 07:28

## 2020-08-03 RX ADMIN — DEXAMETHASONE SODIUM PHOSPHATE 8 MG: 4 INJECTION, SOLUTION INTRAMUSCULAR; INTRAVENOUS at 07:52

## 2020-08-03 RX ADMIN — ROCURONIUM BROMIDE 10 MG: 10 INJECTION INTRAVENOUS at 08:06

## 2020-08-03 RX ADMIN — FENTANYL CITRATE 50 MCG: 0.05 INJECTION, SOLUTION INTRAMUSCULAR; INTRAVENOUS at 09:55

## 2020-08-03 RX ADMIN — FENTANYL CITRATE 50 MCG: 0.05 INJECTION, SOLUTION INTRAMUSCULAR; INTRAVENOUS at 09:40

## 2020-08-03 RX ADMIN — FENTANYL CITRATE 50 MCG: 0.05 INJECTION, SOLUTION INTRAMUSCULAR; INTRAVENOUS at 09:20

## 2020-08-03 RX ADMIN — LIDOCAINE HYDROCHLORIDE 50 MG: 10 INJECTION, SOLUTION INFILTRATION; PERINEURAL at 07:33

## 2020-08-03 RX ADMIN — TRAMADOL HYDROCHLORIDE 50 MG: 50 TABLET, FILM COATED ORAL at 11:48

## 2020-08-03 RX ADMIN — ROCURONIUM BROMIDE 40 MG: 10 INJECTION INTRAVENOUS at 07:33

## 2020-08-03 RX ADMIN — FENTANYL CITRATE 50 MCG: 50 INJECTION, SOLUTION INTRAMUSCULAR; INTRAVENOUS at 09:12

## 2020-08-03 RX ADMIN — LIDOCAINE HYDROCHLORIDE 0.2 ML: 10 INJECTION, SOLUTION EPIDURAL; INFILTRATION; INTRACAUDAL; PERINEURAL at 06:26

## 2020-08-03 RX ADMIN — CELECOXIB 200 MG: 200 CAPSULE ORAL at 06:26

## 2020-08-03 RX ADMIN — SODIUM CHLORIDE, POTASSIUM CHLORIDE, SODIUM LACTATE AND CALCIUM CHLORIDE 500 ML: 600; 310; 30; 20 INJECTION, SOLUTION INTRAVENOUS at 09:18

## 2020-08-03 RX ADMIN — SODIUM CHLORIDE, POTASSIUM CHLORIDE, SODIUM LACTATE AND CALCIUM CHLORIDE: 600; 310; 30; 20 INJECTION, SOLUTION INTRAVENOUS at 05:32

## 2020-08-03 RX ADMIN — EPHEDRINE SULFATE 10 MG: 50 INJECTION INTRAMUSCULAR; INTRAVENOUS; SUBCUTANEOUS at 07:52

## 2020-08-03 RX ADMIN — SCOPALAMINE 1 PATCH: 1 PATCH, EXTENDED RELEASE TRANSDERMAL at 07:12

## 2020-08-03 RX ADMIN — ONDANSETRON 4 MG: 2 INJECTION INTRAMUSCULAR; INTRAVENOUS at 08:54

## 2020-08-03 RX ADMIN — GLYCOPYRROLATE 0.1 MG: 0.2 INJECTION INTRAMUSCULAR; INTRAVENOUS at 08:06

## 2020-08-03 RX ADMIN — FENTANYL CITRATE 50 MCG: 50 INJECTION, SOLUTION INTRAMUSCULAR; INTRAVENOUS at 07:31

## 2020-08-03 NOTE — ANESTHESIA POSTPROCEDURE EVALUATION
Patient: Bhargavi Mohr    Procedure Summary     Date:  08/03/20 Room / Location:   [facility] [address] [place] [address] [facility]    Anesthesia Start:  0728 Anesthesia Stop:      Procedure:  TOTAL LAPAROSCOPIC HYSTERECTOMY LEFT SALPINGOOPHORECTOMY WITH DAVINCI ROBOT (Left Abdomen) Diagnosis:       Dysplasia of cervix, high grade EDMUND 2      (Dysplasia of cervix, high grade EDMUND 2 [N87.1])    Surgeon:  Ne Marmolejo MD Provider:  Campos Pedroza MD    Anesthesia Type:  general ASA Status:  2          Anesthesia Type: general    Vitals  Vitals Value Taken Time   /77 8/3/2020  9:10 AM   Temp     Pulse     Resp     SpO2 97 % 8/3/2020  9:11 AM   Vitals shown include unvalidated device data.        Post Anesthesia Care and Evaluation    Patient location during evaluation: PACU  Patient participation: complete - patient participated  Level of consciousness: awake and alert  Pain score: 0  Pain management: adequate  Airway patency: patent  Anesthetic complications: No anesthetic complications  PONV Status: none  Cardiovascular status: hemodynamically stable and acceptable  Respiratory status: nonlabored ventilation, acceptable and nasal cannula  Hydration status: acceptable

## 2020-08-03 NOTE — ANESTHESIA PROCEDURE NOTES
Airway  Urgency: elective    Date/Time: 8/3/2020 7:34 AM  Airway not difficult    General Information and Staff    Patient location during procedure: OR  CRNA: Julia Cortez CRNA    Indications and Patient Condition  Indications for airway management: airway protection    Preoxygenated: yes  MILS not maintained throughout  Mask difficulty assessment: 1 - vent by mask    Final Airway Details  Final airway type: endotracheal airway      Successful airway: ETT  Cuffed: yes   Successful intubation technique: direct laryngoscopy  Facilitating devices/methods: intubating stylet  Endotracheal tube insertion site: oral  Blade: Maria Del Rosario  Blade size: 3  ETT size (mm): 7.5  Cormack-Lehane Classification: grade I - full view of glottis  Placement verified by: chest auscultation, capnometry and palpation of cuff   Measured from: lips  ETT/EBT  to lips (cm): 20  Number of attempts at approach: 1  Assessment: lips, teeth, and gum same as pre-op and atraumatic intubation    Additional Comments  Negative epigastric sounds, Breath sound equal bilaterally with symmetric chest rise and fall

## 2020-08-03 NOTE — INTERVAL H&P NOTE
"  Pre-Op H&P (See Recent Office Note Attached for Full H&P)    Chief complaint: cervical dysplasia    HPI:      Patient is a 54 y.o. female who presents for a total laparoscopic hysterectomy with left salpingo-oophorectomy with DaVinci robot. She's had abnormal pap smears in the past requiring intervention. Recently, she had a recurrent abnormal pap smear with biopsy proven EDMUND-2. She denies symptoms.       Review of Systems:  General ROS:  no fever, chills, rashes.  No change since last office visit.  No recent sick exposure  Cardiovascular ROS: no chest pain or dyspnea on exertion  Respiratory ROS: no cough, shortness of breath, or wheezing    Immunization History:   Influenza:  yes  Pneumococcal:  yes  Tetanus:  yes    Meds:    No current facility-administered medications on file prior to encounter.      Current Outpatient Medications on File Prior to Encounter   Medication Sig Dispense Refill   • FLUoxetine (PROzac) 20 MG capsule Take 20 mg by mouth Daily.     • lisinopril-hydrochlorothiazide (PRINZIDE,ZESTORETIC) 20-25 MG per tablet Take 1 tablet by mouth Every Morning.     • LO LOESTRIN FE 1 MG-10 MCG / 10 MCG tablet Take 1 tablet by mouth Daily.     • Omeprazole Magnesium (PRILOSEC OTC PO) Take  by mouth Daily.     • rosuvastatin (CRESTOR) 40 MG tablet Take 40 mg by mouth Daily.     • ALPRAZolam (XANAX) 0.25 MG tablet Take 0.25 mg by mouth 3 (Three) Times a Day As Needed for Anxiety or Sleep.     • eszopiclone (LUNESTA) 3 MG tablet Take 3 mg by mouth Every Night.     • valACYclovir (VALTREX) 1000 MG tablet Take  by mouth Daily As Needed (as needed for fever blisters).         Vital Signs:  /82 (BP Location: Right arm, Patient Position: Lying)   Pulse 77   Temp 98.8 °F (37.1 °C) (Temporal)   Ht 165.1 cm (65\")   Wt 75.3 kg (166 lb)   SpO2 99%   BMI 27.62 kg/m²     Physical Exam:    CV:  S1S2 regular rate and rhythm, no murmur               Resp:  Clear to auscultation; respirations regular, even and " unlabored    Results Review:     Lab Results   Component Value Date    WBC 8.16 07/31/2020    HGB 13.1 07/31/2020    HCT 40.3 07/31/2020    MCV 93.7 07/31/2020     07/31/2020    NEUTROABS 5.51 07/31/2020    GLUCOSE 89 07/31/2020    BUN 15 07/31/2020    CREATININE 0.73 07/31/2020    EGFRIFNONA 83 07/31/2020     07/31/2020    K 3.8 07/31/2020     07/31/2020    CO2 28.0 07/31/2020    CALCIUM 9.8 07/31/2020    ALBUMIN 4.50 07/31/2020    AST 16 07/31/2020    ALT 17 07/31/2020    BILITOT 0.2 07/31/2020        I reviewed the patient's new clinical results.    Cancer Staging (if applicable)  Cancer Patient: __ yes __no _x_unknown; EDMUND-2     Assessment:  1. Cervical dysplasia    Plan:  1. Total laparoscopic hysterectomy with left salpingo-oophorectomy with Davinci robot   Patient was seen in the office and risks and benefits were discussed, please see office note below.     WILBERT Granados  8/3/2020   06:47    I saw and evaluated the patient. I agree with the findings and the plan of care as documented in the note.    Ne Marmolejo MD  08/03/20  7:21 AM

## 2020-08-03 NOTE — PLAN OF CARE
Problem: Patient Care Overview  Goal: Plan of Care Review  Outcome: Ongoing (interventions implemented as appropriate)  Flowsheets (Taken 8/3/2020 1024)  Progress: improving  Plan of Care Reviewed With: patient  Outcome Summary: Received patient from PACU at 1030. Phase II patient. Patient ambulated from the stretcher to the bathroom, then to the bed. Tolerating ice chips. No pain compaints. ABD binder in place. Will continue to monitor.  Goal: Individualization and Mutuality  Outcome: Ongoing (interventions implemented as appropriate)  Goal: Discharge Needs Assessment  Outcome: Ongoing (interventions implemented as appropriate)  Goal: Interprofessional Rounds/Family Conf  Outcome: Ongoing (interventions implemented as appropriate)

## 2020-08-03 NOTE — OP NOTE
Subjective     Date of Service:  08/03/20  Time of Service:  09:04    Surgical Staff: Surgeon(s) and Role:     * Ne Marmolejo MD - Primary     * Cee Kingston MD - Resident - Assisting   Additional Staff:          Pre-operative diagnosis(es): Pre-Op Diagnosis Codes:     * Dysplasia of cervix, high grade EDMUND 2 [N87.1]     Post-operative diagnosis(es): Post-Op Diagnosis Codes:     * Dysplasia of cervix, high grade EDMUND 2 [N87.1]   Procedure(s): Procedure(s):  TOTAL LAPAROSCOPIC HYSTERECTOMY LEFT SALPINGOOPHORECTOMY WITH DAVINCI ROBOT     Antibiotics: cefazolin (Ancef) ordered on call to OR     Anesthesia: Type: General  ASA:  II     Objective      Operative findings:  Cervix is consistent with prior surgical procedure.  Right adnexa was surgically absent.  Left adnexa was unremarkable.  No significant abnormalities noted with limited visualization of the abdomen pelvis via laparoscope.     Specimens removed: ID Type Source Tests Collected by Time   A (Not marked as sent) :  Tissue Uterus, Cervix, L Fallopian Tube, L Ovary TISSUE PATHOLOGY EXAM Ne Marmolejo MD 8/3/2020 0819      Fluid Intake and Output: I/O this shift:  In: 750 [I.V.:750]  Out: -    Blood products used: No   Drains: Urethral Catheter Silicone 16 Fr. (Active)      Implant Information: Nothing was implanted during the procedure   Complications:  No immediate   Intraoperative consult(s):    Condition: stable   Disposition: to PACU and then possible discharge home       Indications:  Patient is a pleasant 54-year-old woman is noted to have recurrent cervical dysplasia.  She strongly desired definitive management.  Risks and benefits were discussed.  Consent was signed and on chart.    Procedure:    After obtaining informed consent, the patient was taken to the operating room and underwent general endotracheal anesthesia after patient and site verification. The feet were placed in Hector stirrups. The arms were tucked at the sides. Steep  Trendelenburg positioning was tested and found to be adequate. The abdomen, perineum, and vagina were prepped and draped in the usual sterile fashion. Khan catheter was anchored. Retractors were used to visualize the cervix. Cervix was sounded and the appropriate uterine manipulator was called for.  Uterine manipulator was secured with out difficulty.  Attention was turned to the abdomen. Prior to each incision, skin was injected with 0.5% Marcaine with epinephrine.  An 8 mm trocar was inserted at the umbilical midline position in the Optiview technique.  Laparoscope was introduced to confirm positioning. Steep Trendelenburg was called for. The abdomen was insufflated to a pressure of 15 mmHg with CO2 gas.  2 left-sided 8 mm and 2 right-sided 8 mm trochars were all placed under direct visualization.  The above findings were noted.  Da Piotr robot was docked to the patient and surgeon retired to the operating console.    The peritoneum overlying the pelvic sidewalls was divided with monopolar scissors. Infundibulopelvic ligament on the left was isolated from surrounding structures and pedicle was created using bipolar cautery and scissors. Likewise, the round ligaments were divided. Anterior and posterior leaves of the broad ligament were divided with monopolar scissors. A bladder flap was developed.  Uterine vessels were isolated. Pedicles were created at the level of the uterine vessels using bipolar cautery and scissors. Cardinal ligament and uterosacral ligament complexes were divided in a similar fashion. Monopolar scissors were used to perform a circumferential colpotomy and the specimen was handed off intact via the vagina for permanent pathology.    The vaginal cuff was closed with 0 Vicryl suture in a running locking fashion x2 layers. Good hemostasis was noted. Additional hemostasis was achieved at the pelvis as needed using bipolar cautery. Da Piotr robot was undocked from the patient and the surgeon  returned to the bedside.  Trochars were removed under direct visualization.  CO2 gas was allowed to escape from the abdominal cavity. At that point, no fascial defects could be palpated.  The skin was closed with 3-0 Monocryl in subcuticular stitch and glue was placed at the skin. The vagina was inspected.  Occluder and all instruments had been removed from the vagina.  Good hemostasis was noted at the vagina.  Bladder was back filled with 120 mL of sterile solution and the adrian was discontinued.  The patient was taken to the recovery room in good condition. There were no immediate complications. All counts were correct.          Ne Marmolejo MD  08/03/20  09:04

## 2020-08-03 NOTE — ANESTHESIA PREPROCEDURE EVALUATION
Anesthesia Evaluation     Patient summary reviewed and Nursing notes reviewed   history of anesthetic complications: PONV  NPO Solid Status: > 8 hours  NPO Liquid Status: > 8 hours           Airway   Mallampati: I  TM distance: >3 FB  Neck ROM: full  No difficulty expected  Dental      Pulmonary     breath sounds clear to auscultation  Cardiovascular     ECG reviewed  Rhythm: regular  Rate: normal    (+) hypertension,       Neuro/Psych  GI/Hepatic/Renal/Endo    (+)   diabetes mellitus,     Musculoskeletal     Abdominal    Substance History      OB/GYN          Other      history of cancer                  Anesthesia Plan    ASA 2     general     intravenous induction     Anesthetic plan, all risks, benefits, and alternatives have been provided, discussed and informed consent has been obtained with: patient.    Plan discussed with CRNA.

## 2020-08-06 ENCOUNTER — TELEPHONE (OUTPATIENT)
Dept: GYNECOLOGIC ONCOLOGY | Facility: CLINIC | Age: 55
End: 2020-08-06

## 2020-08-06 NOTE — TELEPHONE ENCOUNTER
----- Message from Ne Marmolejo MD sent at 8/5/2020 10:59 PM EDT -----  Please notify patient of high-grade cervical dysplasia on final pathology, negative margins.  No invasive cancer seen.  Thank you    ----- Message -----  From: Lab, Background User  Sent: 8/4/2020   5:03 PM EDT  To: Ne Marmolejo MD

## 2020-08-09 ENCOUNTER — DOCUMENTATION (OUTPATIENT)
Dept: GYNECOLOGIC ONCOLOGY | Facility: CLINIC | Age: 55
End: 2020-08-09

## 2020-08-09 NOTE — PROGRESS NOTES
"Patient called with complaints of redness and increased pain at right sided laparoscopic incision.  Reports pain and redness worsening since this morning.  She also notices a little bit of drainage underneath her Dermabond.  She states that she has a \"fever\" but reports a T-max of 98.3.  She is very concerned about an infection.  Discussed options of management given that it is Sunday night.  At this point she elects to go to  urgent care for evaluation.  I did offer to have her added on for a problem visit in clinic tomorrow but she reports that she would be more comfortable with evaluation now.  I instructed her to call with any further questions.     HAL Harris MD  "

## 2020-08-10 ENCOUNTER — OFFICE VISIT (OUTPATIENT)
Dept: GYNECOLOGIC ONCOLOGY | Facility: CLINIC | Age: 55
End: 2020-08-10

## 2020-08-10 ENCOUNTER — TELEPHONE (OUTPATIENT)
Dept: GYNECOLOGIC ONCOLOGY | Facility: CLINIC | Age: 55
End: 2020-08-10

## 2020-08-10 VITALS
WEIGHT: 163 LBS | DIASTOLIC BLOOD PRESSURE: 66 MMHG | SYSTOLIC BLOOD PRESSURE: 147 MMHG | HEIGHT: 65 IN | OXYGEN SATURATION: 96 % | RESPIRATION RATE: 16 BRPM | HEART RATE: 79 BPM | TEMPERATURE: 98.6 F | BODY MASS INDEX: 27.16 KG/M2

## 2020-08-10 DIAGNOSIS — T81.49XA WOUND INFECTION AFTER SURGERY: Primary | ICD-10-CM

## 2020-08-10 PROCEDURE — 99024 POSTOP FOLLOW-UP VISIT: CPT | Performed by: OBSTETRICS & GYNECOLOGY

## 2020-08-10 NOTE — TELEPHONE ENCOUNTER
The patient called back today. After speaking with Dr. Harris yesterday the patient went to [facility] and was put on antibiotics. She said her incision has not gotten any better. I spoke with Dr. Harris today and the patient was offered an appointment in clinic today, but she opted to monitor her symptoms and call tomorrow if no improvement or worsening symptoms.

## 2020-08-10 NOTE — PROGRESS NOTES
"     Post Operative Office Visit      Patient Name: Bhargavi Mohr  : 1965   MRN: [id number]     Chief Complaint: Concern for wound infection    Subjective       Bhargavi Mohr is a 54 y.o. female who is status post robotic assisted TLH and BSO on 8/3/2020.  She reports noticing redness and increased pain at right sided laparoscopic incision since yesterday.  She went to urgent care overnight and was started on Keflex.  Since yesterday the redness is continue to worsen.  She now sees purulent discharge underneath the Dermabond.  She presents today for evaluation.     Review of Systems:   Review of Systems   Constitutional: Negative for diaphoresis and fever.   Gastrointestinal: Positive for abdominal pain.   Skin: Positive for wound.        Objective     Physical Exam:  Vital Signs:   Vitals:    08/10/20 1510   BP: 147/66   Pulse: 79   Resp: 16   Temp: 98.6 °F (37 °C)   TempSrc: Temporal   SpO2: 96%   Weight: 73.9 kg (163 lb)   Height: 165.1 cm (65\")   PainSc:   5   PainLoc: Incisional     BMI: Body mass index is 27.12 kg/m².     ECOG performance status 0    Physical Exam   Constitutional: She appears well-developed and well-nourished.   Abdominal: Soft. She exhibits no distension. There is no tenderness. There is no guarding.   Assistant port with approximately 10 cm of erythema and induration around incision.  Dermabond removed.  Approximately 15 cc of purulent drainage expressed.  Suture removed from incision.  Incision probed. Fascia intact.  No tracking.   Nursing note and vitals reviewed.    Assessment / Plan    Bhargvai is a 54-year-old who is postop day #7 from a robotic hysterectomy who presents with concern for surgical site infection.  Incision opened and drained and packed with iodoform gauze.  Patient to continue Keflex twice daily as prescribed by urgent care.  Return precautions discussed including worsening of erythema or development of fever.  She will otherwise return to see Dr. Guevara in 2 " nadira Harris MD  Gynecologic Oncology     Please note that portions of this note may have been completed with a voice recognition program.

## 2020-08-20 ENCOUNTER — APPOINTMENT (OUTPATIENT)
Dept: CT IMAGING | Facility: HOSPITAL | Age: 55
End: 2020-08-20

## 2020-08-20 ENCOUNTER — HOSPITAL ENCOUNTER (INPATIENT)
Facility: HOSPITAL | Age: 55
LOS: 5 days | Discharge: HOME | End: 2020-08-25
Attending: EMERGENCY MEDICINE | Admitting: INTERNAL MEDICINE

## 2020-08-20 DIAGNOSIS — A41.9 SEPSIS WITHOUT ACUTE ORGAN DYSFUNCTION, DUE TO UNSPECIFIED ORGANISM (HCC): Primary | ICD-10-CM

## 2020-08-20 DIAGNOSIS — E87.20 LACTIC ACIDOSIS: ICD-10-CM

## 2020-08-20 DIAGNOSIS — E86.0 ACUTE DEHYDRATION: ICD-10-CM

## 2020-08-20 PROBLEM — E87.6 HYPOKALEMIA: Status: ACTIVE | Noted: 2020-08-20

## 2020-08-20 PROBLEM — N17.9 AKI (ACUTE KIDNEY INJURY): Status: ACTIVE | Noted: 2020-08-20

## 2020-08-20 PROBLEM — K57.90 DIVERTICULOSIS: Status: ACTIVE | Noted: 2020-08-20

## 2020-08-20 PROBLEM — Z85.820 HISTORY OF MELANOMA: Status: ACTIVE | Noted: 2020-08-20

## 2020-08-20 PROBLEM — I10 ESSENTIAL HYPERTENSION: Status: ACTIVE | Noted: 2020-08-20

## 2020-08-20 PROBLEM — E11.9 TYPE 2 DIABETES MELLITUS: Status: ACTIVE | Noted: 2020-08-20

## 2020-08-20 LAB
ALBUMIN SERPL-MCNC: 4 G/DL (ref 3.5–5.2)
ALBUMIN/GLOB SERPL: 1.3 G/DL
ALP SERPL-CCNC: 74 U/L (ref 39–117)
ALT SERPL W P-5'-P-CCNC: 23 U/L (ref 1–33)
ANION GAP SERPL CALCULATED.3IONS-SCNC: 10 MMOL/L (ref 5–15)
ANION GAP SERPL CALCULATED.3IONS-SCNC: 10 MMOL/L (ref 5–15)
ANION GAP SERPL CALCULATED.3IONS-SCNC: 17 MMOL/L (ref 5–15)
ANION GAP SERPL CALCULATED.3IONS-SCNC: 7 MMOL/L (ref 5–15)
ANION GAP SERPL CALCULATED.3IONS-SCNC: 9 MMOL/L (ref 5–15)
ARTERIAL PATENCY WRIST A: ABNORMAL
AST SERPL-CCNC: 20 U/L (ref 1–32)
ATMOSPHERIC PRESS: ABNORMAL MM[HG]
B-OH-BUTYR SERPL-SCNC: 0.11 MMOL/L (ref 0.02–0.27)
BACTERIA UR QL AUTO: ABNORMAL /HPF
BASE EXCESS BLDA CALC-SCNC: -2.2 MMOL/L (ref 0–2)
BASOPHILS # BLD AUTO: 0.07 10*3/MM3 (ref 0–0.2)
BASOPHILS # BLD MANUAL: 0.12 10*3/MM3 (ref 0–0.2)
BASOPHILS NFR BLD AUTO: 0.3 % (ref 0–1.5)
BASOPHILS NFR BLD AUTO: 1 % (ref 0–1.5)
BDY SITE: ABNORMAL
BILIRUB SERPL-MCNC: 0.2 MG/DL (ref 0–1.2)
BILIRUB UR QL STRIP: NEGATIVE
BODY TEMPERATURE: 37 C
BUN SERPL-MCNC: 17 MG/DL (ref 6–20)
BUN SERPL-MCNC: 19 MG/DL (ref 6–20)
BUN SERPL-MCNC: 21 MG/DL (ref 6–20)
BUN/CREAT SERPL: 12.3 (ref 7–25)
BUN/CREAT SERPL: 13.1 (ref 7–25)
BUN/CREAT SERPL: 13.9 (ref 7–25)
BUN/CREAT SERPL: 15.2 (ref 7–25)
BUN/CREAT SERPL: 18.8 (ref 7–25)
CALCIUM SPEC-SCNC: 8.1 MG/DL (ref 8.6–10.5)
CALCIUM SPEC-SCNC: 8.5 MG/DL (ref 8.6–10.5)
CALCIUM SPEC-SCNC: 8.6 MG/DL (ref 8.6–10.5)
CALCIUM SPEC-SCNC: 8.9 MG/DL (ref 8.6–10.5)
CALCIUM SPEC-SCNC: 9.6 MG/DL (ref 8.6–10.5)
CHLORIDE SERPL-SCNC: 101 MMOL/L (ref 98–107)
CHLORIDE SERPL-SCNC: 107 MMOL/L (ref 98–107)
CHLORIDE SERPL-SCNC: 108 MMOL/L (ref 98–107)
CHLORIDE SERPL-SCNC: 111 MMOL/L (ref 98–107)
CHLORIDE SERPL-SCNC: 111 MMOL/L (ref 98–107)
CLARITY UR: CLEAR
CO2 BLDA-SCNC: 20.3 MMOL/L (ref 22–33)
CO2 SERPL-SCNC: 19 MMOL/L (ref 22–29)
CO2 SERPL-SCNC: 22 MMOL/L (ref 22–29)
CO2 SERPL-SCNC: 23 MMOL/L (ref 22–29)
CO2 SERPL-SCNC: 23 MMOL/L (ref 22–29)
CO2 SERPL-SCNC: 25 MMOL/L (ref 22–29)
COHGB MFR BLD: 4.4 % (ref 0–2)
COLOR UR: YELLOW
CREAT SERPL-MCNC: 1.12 MG/DL (ref 0.57–1)
CREAT SERPL-MCNC: 1.25 MG/DL (ref 0.57–1)
CREAT SERPL-MCNC: 1.37 MG/DL (ref 0.57–1)
CREAT SERPL-MCNC: 1.38 MG/DL (ref 0.57–1)
CREAT SERPL-MCNC: 1.45 MG/DL (ref 0.57–1)
D-LACTATE SERPL-SCNC: 1.4 MMOL/L (ref 0.5–2)
D-LACTATE SERPL-SCNC: 3.9 MMOL/L (ref 0.5–2)
DEPRECATED RDW RBC AUTO: 41.2 FL (ref 37–54)
DEPRECATED RDW RBC AUTO: 42.3 FL (ref 37–54)
EOSINOPHIL # BLD AUTO: 0.16 10*3/MM3 (ref 0–0.4)
EOSINOPHIL # BLD MANUAL: 0.12 10*3/MM3 (ref 0–0.4)
EOSINOPHIL NFR BLD AUTO: 0.8 % (ref 0.3–6.2)
EOSINOPHIL NFR BLD MANUAL: 1 % (ref 0.3–6.2)
ERYTHROCYTE [DISTWIDTH] IN BLOOD BY AUTOMATED COUNT: 12.2 % (ref 12.3–15.4)
ERYTHROCYTE [DISTWIDTH] IN BLOOD BY AUTOMATED COUNT: 12.4 % (ref 12.3–15.4)
GFR SERPL CREATININE-BSD FRML MDRD: 38 ML/MIN/1.73
GFR SERPL CREATININE-BSD FRML MDRD: 40 ML/MIN/1.73
GFR SERPL CREATININE-BSD FRML MDRD: 40 ML/MIN/1.73
GFR SERPL CREATININE-BSD FRML MDRD: 45 ML/MIN/1.73
GFR SERPL CREATININE-BSD FRML MDRD: 51 ML/MIN/1.73
GLOBULIN UR ELPH-MCNC: 3.2 GM/DL
GLUCOSE BLDC GLUCOMTR-MCNC: 106 MG/DL (ref 70–130)
GLUCOSE BLDC GLUCOMTR-MCNC: 136 MG/DL (ref 70–130)
GLUCOSE BLDC GLUCOMTR-MCNC: 137 MG/DL (ref 70–130)
GLUCOSE SERPL-MCNC: 123 MG/DL (ref 65–99)
GLUCOSE SERPL-MCNC: 126 MG/DL (ref 65–99)
GLUCOSE SERPL-MCNC: 128 MG/DL (ref 65–99)
GLUCOSE SERPL-MCNC: 148 MG/DL (ref 65–99)
GLUCOSE SERPL-MCNC: 239 MG/DL (ref 65–99)
GLUCOSE UR STRIP-MCNC: ABNORMAL MG/DL
HBA1C MFR BLD: 6.5 % (ref 4.8–5.6)
HCO3 BLDA-SCNC: 19.6 MMOL/L (ref 20–26)
HCT VFR BLD AUTO: 37.5 % (ref 34–46.6)
HCT VFR BLD AUTO: 39 % (ref 34–46.6)
HCT VFR BLD CALC: 38.3 %
HGB BLD-MCNC: 12.1 G/DL (ref 12–15.9)
HGB BLD-MCNC: 12.9 G/DL (ref 12–15.9)
HGB BLDA-MCNC: 12.5 G/DL (ref 14–18)
HGB UR QL STRIP.AUTO: ABNORMAL
HOLD SPECIMEN: NORMAL
HOLD SPECIMEN: NORMAL
HYALINE CASTS UR QL AUTO: ABNORMAL /LPF
IMM GRANULOCYTES # BLD AUTO: 0.12 10*3/MM3 (ref 0–0.05)
IMM GRANULOCYTES NFR BLD AUTO: 0.6 % (ref 0–0.5)
INHALED O2 CONCENTRATION: 21 %
KETONES UR QL STRIP: NEGATIVE
LACTATE HOLD SPECIMEN: NORMAL
LEUKOCYTE ESTERASE UR QL STRIP.AUTO: ABNORMAL
LIPASE SERPL-CCNC: 25 U/L (ref 13–60)
LYMPHOCYTES # BLD AUTO: 1.74 10*3/MM3 (ref 0.7–3.1)
LYMPHOCYTES # BLD MANUAL: 1.11 10*3/MM3 (ref 0.7–3.1)
LYMPHOCYTES NFR BLD AUTO: 8.2 % (ref 19.6–45.3)
LYMPHOCYTES NFR BLD MANUAL: 6 % (ref 5–12)
LYMPHOCYTES NFR BLD MANUAL: 9 % (ref 19.6–45.3)
MAGNESIUM SERPL-MCNC: 1.9 MG/DL (ref 1.6–2.6)
MCH RBC QN AUTO: 30.1 PG (ref 26.6–33)
MCH RBC QN AUTO: 30.1 PG (ref 26.6–33)
MCHC RBC AUTO-ENTMCNC: 32.3 G/DL (ref 31.5–35.7)
MCHC RBC AUTO-ENTMCNC: 33.1 G/DL (ref 31.5–35.7)
MCV RBC AUTO: 90.9 FL (ref 79–97)
MCV RBC AUTO: 93.3 FL (ref 79–97)
METHGB BLD QL: 0.9 % (ref 0–1.5)
MODALITY: ABNORMAL
MONOCYTES # BLD AUTO: 0.74 10*3/MM3 (ref 0.1–0.9)
MONOCYTES # BLD AUTO: 0.92 10*3/MM3 (ref 0.1–0.9)
MONOCYTES NFR BLD AUTO: 4.3 % (ref 5–12)
NEUTROPHILS # BLD AUTO: 10.25 10*3/MM3 (ref 1.7–7)
NEUTROPHILS NFR BLD AUTO: 18.31 10*3/MM3 (ref 1.7–7)
NEUTROPHILS NFR BLD AUTO: 85.8 % (ref 42.7–76)
NEUTROPHILS NFR BLD MANUAL: 83 % (ref 42.7–76)
NITRITE UR QL STRIP: NEGATIVE
NOTE: ABNORMAL
NRBC BLD AUTO-RTO: 0 /100 WBC (ref 0–0.2)
OXYHGB MFR BLDV: 93 % (ref 94–99)
PCO2 BLDA: 25 MM HG (ref 35–45)
PCO2 TEMP ADJ BLD: 25 MM HG (ref 35–45)
PH BLDA: 7.5 PH UNITS (ref 7.35–7.45)
PH UR STRIP.AUTO: 7.5 [PH] (ref 5–8)
PH, TEMP CORRECTED: 7.5 PH UNITS
PLAT MORPH BLD: NORMAL
PLATELET # BLD AUTO: 271 10*3/MM3 (ref 140–450)
PLATELET # BLD AUTO: 326 10*3/MM3 (ref 140–450)
PMV BLD AUTO: 10.2 FL (ref 6–12)
PMV BLD AUTO: 10.4 FL (ref 6–12)
PO2 BLDA: 97.6 MM HG (ref 83–108)
PO2 TEMP ADJ BLD: 97.6 MM HG (ref 83–108)
POTASSIUM SERPL-SCNC: 3.1 MMOL/L (ref 3.5–5.2)
POTASSIUM SERPL-SCNC: 3.6 MMOL/L (ref 3.5–5.2)
POTASSIUM SERPL-SCNC: 3.7 MMOL/L (ref 3.5–5.2)
POTASSIUM SERPL-SCNC: 4.4 MMOL/L (ref 3.5–5.2)
POTASSIUM SERPL-SCNC: 4.4 MMOL/L (ref 3.5–5.2)
PROT SERPL-MCNC: 7.2 G/DL (ref 6–8.5)
PROT UR QL STRIP: ABNORMAL
RBC # BLD AUTO: 4.02 10*6/MM3 (ref 3.77–5.28)
RBC # BLD AUTO: 4.29 10*6/MM3 (ref 3.77–5.28)
RBC # UR: ABNORMAL /HPF
RBC MORPH BLD: NORMAL
REF LAB TEST METHOD: ABNORMAL
SODIUM SERPL-SCNC: 137 MMOL/L (ref 136–145)
SODIUM SERPL-SCNC: 140 MMOL/L (ref 136–145)
SODIUM SERPL-SCNC: 141 MMOL/L (ref 136–145)
SODIUM SERPL-SCNC: 142 MMOL/L (ref 136–145)
SODIUM SERPL-SCNC: 143 MMOL/L (ref 136–145)
SP GR UR STRIP: 1.01 (ref 1–1.03)
SQUAMOUS #/AREA URNS HPF: ABNORMAL /HPF
UROBILINOGEN UR QL STRIP: ABNORMAL
VENTILATOR MODE: ABNORMAL
WBC # BLD AUTO: 12.35 10*3/MM3 (ref 3.4–10.8)
WBC # BLD AUTO: 21.32 10*3/MM3 (ref 3.4–10.8)
WBC MORPH BLD: NORMAL
WBC UR QL AUTO: ABNORMAL /HPF
WHOLE BLOOD HOLD SPECIMEN: NORMAL
WHOLE BLOOD HOLD SPECIMEN: NORMAL

## 2020-08-20 PROCEDURE — 85007 BL SMEAR W/DIFF WBC COUNT: CPT | Performed by: NURSE PRACTITIONER

## 2020-08-20 PROCEDURE — 83605 ASSAY OF LACTIC ACID: CPT | Performed by: EMERGENCY MEDICINE

## 2020-08-20 PROCEDURE — 83735 ASSAY OF MAGNESIUM: CPT | Performed by: FAMILY MEDICINE

## 2020-08-20 PROCEDURE — 82962 GLUCOSE BLOOD TEST: CPT

## 2020-08-20 PROCEDURE — 82010 KETONE BODYS QUAN: CPT | Performed by: EMERGENCY MEDICINE

## 2020-08-20 PROCEDURE — 36600 WITHDRAWAL OF ARTERIAL BLOOD: CPT

## 2020-08-20 PROCEDURE — 25010000002 MORPHINE PER 10 MG: Performed by: EMERGENCY MEDICINE

## 2020-08-20 PROCEDURE — 83036 HEMOGLOBIN GLYCOSYLATED A1C: CPT | Performed by: NURSE PRACTITIONER

## 2020-08-20 PROCEDURE — 74176 CT ABD & PELVIS W/O CONTRAST: CPT

## 2020-08-20 PROCEDURE — 81001 URINALYSIS AUTO W/SCOPE: CPT | Performed by: EMERGENCY MEDICINE

## 2020-08-20 PROCEDURE — 99284 EMERGENCY DEPT VISIT MOD MDM: CPT

## 2020-08-20 PROCEDURE — 85025 COMPLETE CBC W/AUTO DIFF WBC: CPT | Performed by: EMERGENCY MEDICINE

## 2020-08-20 PROCEDURE — 25010000002 PIPERACILLIN SOD-TAZOBACTAM PER 1 G: Performed by: EMERGENCY MEDICINE

## 2020-08-20 PROCEDURE — 87040 BLOOD CULTURE FOR BACTERIA: CPT | Performed by: EMERGENCY MEDICINE

## 2020-08-20 PROCEDURE — 25010000002 PIPERACILLIN SOD-TAZOBACTAM PER 1 G: Performed by: NURSE PRACTITIONER

## 2020-08-20 PROCEDURE — 25810000003 SODIUM CHLORIDE 0.9 % WITH KCL 20 MEQ 20-0.9 MEQ/L-% SOLUTION: Performed by: INTERNAL MEDICINE

## 2020-08-20 PROCEDURE — 80048 BASIC METABOLIC PNL TOTAL CA: CPT | Performed by: NURSE PRACTITIONER

## 2020-08-20 PROCEDURE — 99223 1ST HOSP IP/OBS HIGH 75: CPT | Performed by: FAMILY MEDICINE

## 2020-08-20 PROCEDURE — 83690 ASSAY OF LIPASE: CPT | Performed by: EMERGENCY MEDICINE

## 2020-08-20 PROCEDURE — 80053 COMPREHEN METABOLIC PANEL: CPT | Performed by: EMERGENCY MEDICINE

## 2020-08-20 PROCEDURE — 85027 COMPLETE CBC AUTOMATED: CPT | Performed by: NURSE PRACTITIONER

## 2020-08-20 PROCEDURE — 82805 BLOOD GASES W/O2 SATURATION: CPT

## 2020-08-20 PROCEDURE — 25010000002 ONDANSETRON PER 1 MG: Performed by: EMERGENCY MEDICINE

## 2020-08-20 PROCEDURE — 99253 IP/OBS CNSLTJ NEW/EST LOW 45: CPT | Performed by: OBSTETRICS & GYNECOLOGY

## 2020-08-20 RX ORDER — ONDANSETRON 2 MG/ML
4 INJECTION INTRAMUSCULAR; INTRAVENOUS ONCE
Status: COMPLETED | OUTPATIENT
Start: 2020-08-20 | End: 2020-08-20

## 2020-08-20 RX ORDER — POTASSIUM CHLORIDE 7.45 MG/ML
10 INJECTION INTRAVENOUS
Status: DISCONTINUED | OUTPATIENT
Start: 2020-08-20 | End: 2020-08-25 | Stop reason: HOSPADM

## 2020-08-20 RX ORDER — SODIUM CHLORIDE 0.9 % (FLUSH) 0.9 %
10 SYRINGE (ML) INJECTION AS NEEDED
Status: DISCONTINUED | OUTPATIENT
Start: 2020-08-20 | End: 2020-08-25 | Stop reason: HOSPADM

## 2020-08-20 RX ORDER — POTASSIUM CHLORIDE 750 MG/1
40 CAPSULE, EXTENDED RELEASE ORAL AS NEEDED
Status: DISCONTINUED | OUTPATIENT
Start: 2020-08-20 | End: 2020-08-20 | Stop reason: SDUPTHER

## 2020-08-20 RX ORDER — IBUPROFEN 200 MG
200 TABLET ORAL EVERY 6 HOURS PRN
COMMUNITY
End: 2022-07-04 | Stop reason: HOSPADM

## 2020-08-20 RX ORDER — SODIUM CHLORIDE 9 MG/ML
75 INJECTION, SOLUTION INTRAVENOUS CONTINUOUS
Status: DISCONTINUED | OUTPATIENT
Start: 2020-08-20 | End: 2020-08-20

## 2020-08-20 RX ORDER — MAGNESIUM SULFATE HEPTAHYDRATE 40 MG/ML
2 INJECTION, SOLUTION INTRAVENOUS AS NEEDED
Status: DISCONTINUED | OUTPATIENT
Start: 2020-08-20 | End: 2020-08-25 | Stop reason: HOSPADM

## 2020-08-20 RX ORDER — POTASSIUM CHLORIDE 750 MG/1
40 CAPSULE, EXTENDED RELEASE ORAL AS NEEDED
Status: DISCONTINUED | OUTPATIENT
Start: 2020-08-20 | End: 2020-08-25 | Stop reason: HOSPADM

## 2020-08-20 RX ORDER — NALOXONE HCL 0.4 MG/ML
0.4 VIAL (ML) INJECTION
Status: DISCONTINUED | OUTPATIENT
Start: 2020-08-20 | End: 2020-08-25 | Stop reason: HOSPADM

## 2020-08-20 RX ORDER — POTASSIUM CHLORIDE 1.5 G/1.77G
40 POWDER, FOR SOLUTION ORAL AS NEEDED
Status: DISCONTINUED | OUTPATIENT
Start: 2020-08-20 | End: 2020-08-25 | Stop reason: HOSPADM

## 2020-08-20 RX ORDER — HEPARIN SODIUM 5000 [USP'U]/ML
5000 INJECTION, SOLUTION INTRAVENOUS; SUBCUTANEOUS EVERY 12 HOURS SCHEDULED
Status: DISCONTINUED | OUTPATIENT
Start: 2020-08-20 | End: 2020-08-20

## 2020-08-20 RX ORDER — ALPRAZOLAM 0.25 MG/1
0.25 TABLET ORAL 3 TIMES DAILY PRN
Status: DISCONTINUED | OUTPATIENT
Start: 2020-08-20 | End: 2020-08-25 | Stop reason: HOSPADM

## 2020-08-20 RX ORDER — DEXTROSE MONOHYDRATE 25 G/50ML
25 INJECTION, SOLUTION INTRAVENOUS
Status: DISCONTINUED | OUTPATIENT
Start: 2020-08-20 | End: 2020-08-25 | Stop reason: HOSPADM

## 2020-08-20 RX ORDER — CALCIUM CARBONATE 750 MG/1
750 TABLET, CHEWABLE ORAL 3 TIMES DAILY PRN
Status: DISCONTINUED | OUTPATIENT
Start: 2020-08-20 | End: 2020-08-25 | Stop reason: HOSPADM

## 2020-08-20 RX ORDER — ROSUVASTATIN CALCIUM 20 MG/1
40 TABLET, COATED ORAL DAILY
Status: DISCONTINUED | OUTPATIENT
Start: 2020-08-20 | End: 2020-08-25 | Stop reason: HOSPADM

## 2020-08-20 RX ORDER — POTASSIUM CHLORIDE 1.5 G/1.77G
40 POWDER, FOR SOLUTION ORAL AS NEEDED
Status: DISCONTINUED | OUTPATIENT
Start: 2020-08-20 | End: 2020-08-20 | Stop reason: SDUPTHER

## 2020-08-20 RX ORDER — SODIUM CHLORIDE 9 MG/ML
125 INJECTION, SOLUTION INTRAVENOUS CONTINUOUS
Status: DISCONTINUED | OUTPATIENT
Start: 2020-08-20 | End: 2020-08-20

## 2020-08-20 RX ORDER — NICOTINE POLACRILEX 4 MG
15 LOZENGE BUCCAL
Status: DISCONTINUED | OUTPATIENT
Start: 2020-08-20 | End: 2020-08-25 | Stop reason: HOSPADM

## 2020-08-20 RX ORDER — OMEPRAZOLE 20 MG/1
20 CAPSULE, DELAYED RELEASE ORAL DAILY
COMMUNITY
End: 2020-09-11

## 2020-08-20 RX ORDER — MORPHINE SULFATE 2 MG/ML
1 INJECTION, SOLUTION INTRAMUSCULAR; INTRAVENOUS EVERY 4 HOURS PRN
Status: DISCONTINUED | OUTPATIENT
Start: 2020-08-20 | End: 2020-08-25 | Stop reason: HOSPADM

## 2020-08-20 RX ORDER — SODIUM CHLORIDE AND POTASSIUM CHLORIDE 150; 900 MG/100ML; MG/100ML
125 INJECTION, SOLUTION INTRAVENOUS CONTINUOUS
Status: DISCONTINUED | OUTPATIENT
Start: 2020-08-20 | End: 2020-08-21

## 2020-08-20 RX ORDER — MORPHINE SULFATE 4 MG/ML
4 INJECTION, SOLUTION INTRAMUSCULAR; INTRAVENOUS ONCE
Status: COMPLETED | OUTPATIENT
Start: 2020-08-20 | End: 2020-08-20

## 2020-08-20 RX ORDER — MAGNESIUM SULFATE HEPTAHYDRATE 40 MG/ML
4 INJECTION, SOLUTION INTRAVENOUS AS NEEDED
Status: DISCONTINUED | OUTPATIENT
Start: 2020-08-20 | End: 2020-08-25 | Stop reason: HOSPADM

## 2020-08-20 RX ORDER — POTASSIUM CHLORIDE 7.45 MG/ML
10 INJECTION INTRAVENOUS
Status: DISCONTINUED | OUTPATIENT
Start: 2020-08-20 | End: 2020-08-20 | Stop reason: SDUPTHER

## 2020-08-20 RX ORDER — FLUOXETINE HYDROCHLORIDE 20 MG/1
20 CAPSULE ORAL DAILY
Status: DISCONTINUED | OUTPATIENT
Start: 2020-08-20 | End: 2020-08-25 | Stop reason: HOSPADM

## 2020-08-20 RX ORDER — ACETAMINOPHEN 325 MG/1
650 TABLET ORAL EVERY 6 HOURS PRN
Status: DISCONTINUED | OUTPATIENT
Start: 2020-08-20 | End: 2020-08-25 | Stop reason: HOSPADM

## 2020-08-20 RX ADMIN — POTASSIUM CHLORIDE AND SODIUM CHLORIDE 125 ML/HR: 900; 150 INJECTION, SOLUTION INTRAVENOUS at 11:50

## 2020-08-20 RX ADMIN — ROSUVASTATIN CALCIUM 40 MG: 20 TABLET, COATED ORAL at 11:51

## 2020-08-20 RX ADMIN — MORPHINE SULFATE 4 MG: 4 INJECTION, SOLUTION INTRAMUSCULAR; INTRAVENOUS at 03:50

## 2020-08-20 RX ADMIN — METRONIDAZOLE 500 MG: 500 INJECTION, SOLUTION INTRAVENOUS at 15:31

## 2020-08-20 RX ADMIN — ACETAMINOPHEN 650 MG: 325 TABLET, FILM COATED ORAL at 18:28

## 2020-08-20 RX ADMIN — SODIUM CHLORIDE 1000 ML: 9 INJECTION, SOLUTION INTRAVENOUS at 03:06

## 2020-08-20 RX ADMIN — VANCOMYCIN HYDROCHLORIDE 250 MG: KIT at 11:50

## 2020-08-20 RX ADMIN — METRONIDAZOLE 500 MG: 500 INJECTION, SOLUTION INTRAVENOUS at 06:54

## 2020-08-20 RX ADMIN — POTASSIUM CHLORIDE AND SODIUM CHLORIDE 125 ML/HR: 900; 150 INJECTION, SOLUTION INTRAVENOUS at 21:47

## 2020-08-20 RX ADMIN — METRONIDAZOLE 500 MG: 500 INJECTION, SOLUTION INTRAVENOUS at 21:43

## 2020-08-20 RX ADMIN — POTASSIUM CHLORIDE 40 MEQ: 10 CAPSULE, COATED, EXTENDED RELEASE ORAL at 11:50

## 2020-08-20 RX ADMIN — TAZOBACTAM SODIUM AND PIPERACILLIN SODIUM 3.38 G: 375; 3 INJECTION, SOLUTION INTRAVENOUS at 03:50

## 2020-08-20 RX ADMIN — TAZOBACTAM SODIUM AND PIPERACILLIN SODIUM 3.38 G: 375; 3 INJECTION, SOLUTION INTRAVENOUS at 21:43

## 2020-08-20 RX ADMIN — ONDANSETRON 4 MG: 2 INJECTION INTRAMUSCULAR; INTRAVENOUS at 03:50

## 2020-08-20 RX ADMIN — POTASSIUM CHLORIDE 40 MEQ: 10 CAPSULE, COATED, EXTENDED RELEASE ORAL at 18:28

## 2020-08-20 RX ADMIN — VANCOMYCIN HYDROCHLORIDE 250 MG: KIT at 18:28

## 2020-08-20 RX ADMIN — SODIUM CHLORIDE 1000 ML: 9 INJECTION, SOLUTION INTRAVENOUS at 03:50

## 2020-08-20 RX ADMIN — CALCIUM CARBONATE 750 MG: 750 TABLET ORAL at 18:38

## 2020-08-20 RX ADMIN — FLUOXETINE HYDROCHLORIDE 20 MG: 20 CAPSULE ORAL at 11:51

## 2020-08-20 RX ADMIN — ACETAMINOPHEN 650 MG: 325 TABLET, FILM COATED ORAL at 11:51

## 2020-08-20 RX ADMIN — TAZOBACTAM SODIUM AND PIPERACILLIN SODIUM 3.38 G: 375; 3 INJECTION, SOLUTION INTRAVENOUS at 15:31

## 2020-08-20 NOTE — H&P
"[facility] Medicine Services  HISTORY AND PHYSICAL    Patient Name: Bhargavi Mohr  : 1965  MRN: [id number]  Primary Care Physician: Charu Jordan MD    Subjective   Subjective     Chief Complaint:  Abdominal pain     HPI:  Bhargavi Mohr is a 54 y.o. female with a past medical history significant for anxiety, hyperlipidemia, essential hypertension, diverticulosis, melanoma and diabetes mellitus type 2 presents to the ED with complaints of abdominal pain.  On 8/3/20 patient underwent total laparoscopic hysterectomy with left self being oophorectomy with da Piotr robot secondary to dysplasia of cervix, high-grade EDMUND-2.  She reports about a week ago she had noted 1 of the incision sites draining therefore she presented to urgent treatment care and was prescribed Keflex.  She followed up with Dr. Guevara's office in which they DC'd the glue and stitch and then packed the incision with gauze.  She reports improvement at that site but approximately 4 days later she had another site start to drain.  She reports she took the glue off herself and the site improved.  Approximately 3 to 4 days ago she has had increased back pain along with abdominal cramping that is progressively worsened over the past 24 hours.  She describes her pain as severe \"labor pain \"that starts in her right lower quadrant and goes across her abdomen into her back.  In addition she reports frequent stools that are foul-smelling and dark in appearance.  She also notes nausea and vomiting.  She denies any known fever, chills, shortness of air, cough, hematochezia, chest pain or palpitations.  CT of abdomen and pelvis obtained in the ED showed partially calcified 17 mm left renal lesion.  Could represent a complex cyst, solid mass within the aneurysm.  Numerous colonic diverticula.  There could be some subtle inflammatory changes in the left side of the pelvis.  Could possibly represent focal colitis or diverticulitis.  " Currently patient appears comfortable with no complaints.  Patient will be admitted to [facility] under the care of the hospitalist for further evaluation treatment.    Review of Systems   Constitutional: Positive for appetite change. Negative for activity change, chills, diaphoresis, fatigue, fever and unexpected weight change.   HENT: Negative.    Eyes: Negative for photophobia and visual disturbance.   Respiratory: Negative for cough and shortness of breath.    Cardiovascular: Negative for chest pain, palpitations and leg swelling.   Gastrointestinal: Positive for abdominal pain, diarrhea, nausea and vomiting. Negative for abdominal distention, blood in stool and constipation.   Genitourinary: Negative for difficulty urinating, dysuria, flank pain and frequency.   Musculoskeletal: Positive for back pain. Negative for neck pain and neck stiffness.   Skin: Negative.    Neurological: Negative.    Psychiatric/Behavioral: Negative.         Otherwise 10-system ROS reviewed and is negative except as mentioned in the HPI.    Personal History     Past Medical History:   Diagnosis Date   • Anxiety    • Diabetes (CMS/HCC)     diet and exercise controlled    • Diverticulitis    • History of right salpingo-oophorectomy 12/2018   • Hypertension    • Melanoma (CMS/HCC)    • PONV (postoperative nausea and vomiting)    • Positive TB test    • Wears glasses        Past Surgical History:   Procedure Laterality Date   • APPENDECTOMY     • CERVICAL CONIZATION     • COLONOSCOPY  2019 DEC    • ENDOSCOPY     • OOPHORECTOMY Right    • SKIN CANCER EXCISION      melanoma   • TONSILLECTOMY AND ADENOIDECTOMY     • TOTAL LAPAROSCOPIC HYSTERECTOMY SALPINGO OOPHORECTOMY Left 8/3/2020    Procedure: TOTAL LAPAROSCOPIC HYSTERECTOMY LEFT SALPINGOOPHORECTOMY WITH DAVINCI ROBOT;  Surgeon: Ne Marmolejo MD;  Location: [facility];  Service: [facility];  Laterality: Left;       Family History: family history includes Colon cancer in her  father; Heart disease in her mother.     Social History:  reports that she has been smoking cigarettes. She has a 20.00 pack-year smoking history. She has never used smokeless tobacco. She reports that she does not drink alcohol or use drugs.    Medications:    No current facility-administered medications on file prior to encounter.      Current Outpatient Medications on File Prior to Encounter   Medication Sig Dispense Refill   • acetaminophen (TYLENOL) 325 MG tablet Take 2 tablets by mouth Every 6 (Six) Hours As Needed for Mild Pain . 60 tablet 2   • ALPRAZolam (XANAX) 0.25 MG tablet Take 0.25 mg by mouth 3 (Three) Times a Day As Needed for Anxiety or Sleep.     • docusate sodium 250 MG capsule Take 250 mg by mouth 2 (Two) Times a Day As Needed for Constipation. 60 each 3   • eszopiclone (LUNESTA) 3 MG tablet Take 3 mg by mouth Every Night.     • FLUoxetine (PROzac) 20 MG capsule Take 20 mg by mouth Daily.     • ibuprofen (ADVIL,MOTRIN) 600 MG tablet Take 1 tablet by mouth Every 6 (Six) Hours As Needed for Mild Pain . 30 tablet 1   • lisinopril-hydrochlorothiazide (PRINZIDE,ZESTORETIC) 20-25 MG per tablet Take 1 tablet by mouth Every Morning.     • Omeprazole Magnesium (PRILOSEC OTC PO) Take  by mouth Daily.     • ondansetron (ZOFRAN) 4 MG tablet Take 1 tablet by mouth Every 6 (Six) Hours As Needed for Nausea or Vomiting. 10 tablet 0   • rosuvastatin (CRESTOR) 40 MG tablet Take 40 mg by mouth Daily.     • valACYclovir (VALTREX) 1000 MG tablet Take  by mouth Daily As Needed (as needed for fever blisters).           Allergies   Allergen Reactions   • Codeine Dizziness and Delirium     Feels drunk   • Percocet [Oxycodone-Acetaminophen] Itching       Objective   Objective     Vital Signs:   Temp:  [99.8 °F (37.7 °C)] 99.8 °F (37.7 °C)  Heart Rate:  [106] 106  Resp:  [20] 20  BP: (145)/(82) 145/82        Physical Exam   Constitutional: She is oriented to person, place, and time. She appears well-developed and  well-nourished. No distress.   Alert and oriented x4   HENT:   Head: Normocephalic and atraumatic.   Eyes: Pupils are equal, round, and reactive to light. Conjunctivae and EOM are normal. Right eye exhibits no discharge. Left eye exhibits no discharge. No scleral icterus.   Neck: Normal range of motion. Neck supple. No JVD present. No tracheal deviation present. No thyromegaly present.   Cardiovascular: Normal rate, regular rhythm, normal heart sounds and intact distal pulses. Exam reveals no gallop and no friction rub.   No murmur heard.  Pulmonary/Chest: Effort normal and breath sounds normal. No stridor. No respiratory distress. She has no wheezes. She has no rales. She exhibits no tenderness.   Abdominal: Soft. Bowel sounds are normal. She exhibits no distension and no mass. There is tenderness. There is no rebound and no guarding. No hernia.   Increased tenderness to right lower quadrant  -Laparoscopic sites present.  Upper site mildly erythematous, no drainage noted   Musculoskeletal: Normal range of motion. She exhibits no edema, tenderness or deformity.   Lymphadenopathy:     She has no cervical adenopathy.   Neurological: She is alert and oriented to person, place, and time.   Skin: Skin is warm and dry. No rash noted. She is not diaphoretic. No erythema. No pallor.   Psychiatric: She has a normal mood and affect. Her behavior is normal. Judgment and thought content normal.   Nursing note and vitals reviewed.       Results Reviewed:  I have personally reviewed current lab, radiology, and data and agree.    Results from last 7 days   Lab Units 08/20/20  0302   WBC 10*3/mm3 21.32*   HEMOGLOBIN g/dL 12.9   HEMATOCRIT % 39.0   PLATELETS 10*3/mm3 326     Results from last 7 days   Lab Units 08/20/20  0302   SODIUM mmol/L 137   POTASSIUM mmol/L 3.1*   CHLORIDE mmol/L 101   CO2 mmol/L 19.0*   BUN mg/dL 21*   CREATININE mg/dL 1.12*   GLUCOSE mg/dL 239*   CALCIUM mg/dL 9.6   ALT (SGPT) U/L 23   AST (SGOT) U/L 20      No results found for: BNP  pH, Arterial   Date Value Ref Range Status   08/20/2020 7.502 (H) 7.350 - 7.450 pH units Final     Comment:     83 Value above reference range     Imaging Results (Last 24 Hours)     Procedure Component Value Units Date/Time    CT Abdomen Pelvis Without Contrast [[id number]] Collected:  08/20/20 0327     Updated:  08/20/20 0329    Narrative:       CT Abdomen Pelvis WO    INDICATION:   Abdominal pain all over for 3 days    TECHNIQUE:   CT of the abdomen and pelvis without IV contrast. Coronal and sagittal reconstructions were obtained.  Radiation dose reduction techniques included automated exposure control or exposure modulation based on body size. Count of known CT and cardiac nuc  med studies performed in previous 12 months: 0.     COMPARISON:   None available.    FINDINGS:  Abdomen: Lung bases are clear the liver, gallbladder, spleen, pancreas, adrenal glands and right kidney normal.    The left kidney has an oval lesion in the midportion of the kidney. This is just anterior to the renal parenchyma. It measures about 17 mm maximum dimension and is partially calcified. This is likely a complex cyst. The aneurysm is less likely.    The aorta is normal in size. There is no adenopathy. The bowel shows numerous left colon diverticuli. There seems be some mild inflammatory stranding in the left side of the pelvis. I cannot clearly identify any site of diverticulitis there is no colon  wall thickening identified.    Pelvis: Bladder is normal. The uterus is been removed. There are no adnexal masses. Bones are unremarkable.      Impression:       Partially calcified 17 mm left renal lesion. This could represent a complex cyst, solid mass within the aneurysm. Follow-up as an outpatient with multiphase renal CT scan is recommended.    Numerous colonic diverticuli    This seems be some subtle inflammatory change in the left side of the pelvis. This is best appreciated on image 72. This could  possibly represent focal colitis or diverticulitis.          Signer Name: Javier Lima MD   Signed: 8/20/2020 3:27 AM   Workstation Name: [facility]-    [facility]             Assessment/Plan   Assessment / Plan     Active Hospital Problems    Diagnosis   • **Sepsis without acute organ dysfunction (CMS/HCC)   • Type 2 diabetes mellitus (CMS/HCC)   • History of melanoma   • Essential hypertension   • Diverticulosis   • Hypokalemia   • HERO (acute kidney injury) (CMS/HCC)         Assessment & Plan:  54-year-old female presents to the ED with worsening abdominal pain, diarrhea and nausea and vomiting.    1) Sepsis  -Lactic acid 3.9, WBC 21.32  -Source not completely clear  -Questionable diverticulitis on CT of abdomen and pelvis  -We will check stool for C. Difficile  -Continue Zosyn add vaginal  -Blood cultures pending  -Reflex lactic pending  -Status post 2 L normal saline in the ED  -Continue IV fluids    2) diabetes mellitus type 2      Questionable DKA  -Glucose 239, beta hydroxy butyrate negative anion gap 17  -Status post 2 L normal saline  -Repeat BMP now  -Patient reports on no medications, diet controlled  -Check hemoglobin A1c  -Start low-dose sliding scale insulin  -Fingersticks before meals and at bedtime    3) dysplasia of cervix  -Status post laparoscopic hysterectomy bilateral salpingo-oophorectomy on 8/3/2020  -Courtesy consult to Dr. Guevara    4) hypokalemia  -Replace per protocol  -Check mag  -Repeat labs in a.m.    5) HERO  -Likely secondary to volume depletion  -Continue IV fluids  -Status post 2 L normal saline   -Repeat labs in a.m.  -Hold nephrotoxic agents    6) essential hypertension  -Hold lisinopril HCTZ due to #5  -BP stable  -Monitor    7) hyperlipidemia  -Continue statin    8) anxiety  -Continue PRN Xanax, Prozac    DVT prophylaxis:scds, heparin    CODE STATUS:  Full code   Code Status and Medical Interventions:   Ordered at: 08/20/20 0601     Level Of Support  Discussed With:    Patient     Code Status:    CPR     Medical Interventions (Level of Support Prior to Arrest):    Full       Admission Status:  I believe this patient meets INPATIENT status due to sepsis.  I feel patient’s risk for adverse outcomes and need for care warrant INPATIENT evaluation and I predict the patient’s care encounter to likely last beyond 2 midnights.    Electronically signed by TAMMY Wilson, 08/20/20, 5:56 AM.        TAMMY Wilson   08/20/20   05:43      Attending   Admission Attestation       I have seen and examined the patient, performing an independent face-to-face diagnostic evaluation with plan of care reviewed and developed with the advanced practice clinician (APC).      Brief Summary Statement:   Bhargavi Mohr is a 54 y.o. female with past medical history of generalized anxiety disorder, H LD, HTN, diverticulosis melanoma and type 2 diabetes mellitus.  Patient presented to [facility] ED with abdominal pain.  On 8/3/2020 patient underwent laparoscopic hysterectomy with left sided oophorectomy with da Piotr robot secondary to cervical dysplasia with high-grade EDMUND-2.  Patient was seen in Dr. Guevara's office due to increased erythema at incision sites and was started on Keflex.  She later subsequently developed diarrhea.  That her abdomen is crampy throughout but greatest in the right lower quadrant.  CT of the abdomen and pelvis was obtained in the ED and noted a complex cyst of the left kidney ED provider was concerned about the possibility of some diverticulitis.  Patient also with recent antibiotics and diarrhea therefore we rule her out for C. difficile and treat with IV Zosyn and Flagyl until stool culture results are available.      Remainder of detailed HPI is as noted by APC and has been reviewed and/or edited by me for completeness.    Attending Physical Exam:  Constitutional: Awake, alert  Eyes: PERRLA, sclerae anicteric, no conjunctival injection  HENT: NCAT,  mucous membranes moist  Neck: Supple, no thyromegaly, no lymphadenopathy, trachea midline  Respiratory: Clear to auscultation bilaterally, nonlabored respirations   Cardiovascular: RRR, no murmurs, rubs, or gallops, palpable pedal pulses bilaterally  Gastrointestinal: Positive bowel sounds, soft, tenderness at trocar site on the right upper quadrant.  Other laparoscopic trocar sites are noted to be tender with some erythema.  Musculoskeletal: No bilateral ankle edema, no clubbing or cyanosis to extremities  Psychiatric: Appropriate affect, cooperative  Neurologic: Oriented x 3, strength symmetric in all extremities, Cranial Nerves grossly intact to confrontation, speech clear  Skin: No rashes      Brief Assessment/Plan :  See detailed assessment and plan developed with APC which I have reviewed and/or edited for completeness.    Electronically signed by Barbara Haile DO, 08/20/20, 8:25 AM.

## 2020-08-20 NOTE — ED PROVIDER NOTES
Subjective   84-year-old female presents with complaint of right upper quadrant dull pain.  The patient reports symptoms have been present over the last 2 days.  She reports an underlying constant pain, with an additional sharp intermittent pain.  The sharp intermittent pain starts in the right upper quadrant and radiates laterally to the left upper quadrant both anteriorly and posteriorly.  She denies any radiation into the chest.  She denies any radiation into the lower abdomen.  She did report pain radiating from her abdomen down into her bilateral thighs earlier the day.  She reports some mild episodes of nausea that she feels is secondary to pain.  She reports a recent laparoscopic hysterectomy and bilateral salpingo-oophorectomy on the third of this month.  She subsequently had a follow-up visit with her surgeon, Dr. Guevara, and 1 of the laparoscopic wounds had been opened up due to infection.  She has completed antibiotics for treatment of this wound infection.  She reports the wound is dramatically improved in appearance.  The current pain is not associated with previous laparoscopic surgical wound.  She does report increased thirst and in association with this increased urine output.  She reports that she is a known diabetic but controls it with diet and exercise and does not take any medications.  No previous history of DKA.  She denies recording a true fever but the temperature was 99.8 upon initial presentation.  She denies any body aches, or chills.  No reported respiratory symptoms including no upper respiratory congestion, sore throat, rhinorrhea, cough, chest pain, shortness of breath, change in sense of taste or smell.          Review of Systems   Constitutional: Negative for chills, fatigue and fever.   HENT: Negative for congestion, ear pain, postnasal drip, sinus pressure and sore throat.    Eyes: Negative for pain, redness and visual disturbance.   Respiratory: Negative for cough, chest  tightness and shortness of breath.    Cardiovascular: Negative for chest pain, palpitations and leg swelling.   Gastrointestinal: Positive for abdominal pain and nausea. Negative for anal bleeding, blood in stool, diarrhea and vomiting.   Endocrine: Positive for polydipsia and polyuria.   Genitourinary: Positive for frequency. Negative for difficulty urinating, dysuria and urgency.   Musculoskeletal: Negative for arthralgias, back pain and neck pain.   Skin: Negative for pallor and rash.   Allergic/Immunologic: Negative for environmental allergies and immunocompromised state.   Neurological: Negative for dizziness, weakness and headaches.   Hematological: Negative for adenopathy.   Psychiatric/Behavioral: Negative for confusion, self-injury and suicidal ideas. The patient is not nervous/anxious.    All other systems reviewed and are negative.      Past Medical History:   Diagnosis Date   • Anxiety    • Diabetes (CMS/HCC)     diet and exercise controlled    • Diverticulitis    • History of right salpingo-oophorectomy 12/2018   • Hypertension    • Melanoma (CMS/HCC)    • PONV (postoperative nausea and vomiting)    • Positive TB test    • Wears glasses        Allergies   Allergen Reactions   • Codeine Dizziness and Delirium     Feels drunk   • Percocet [Oxycodone-Acetaminophen] Itching       Past Surgical History:   Procedure Laterality Date   • APPENDECTOMY     • CERVICAL CONIZATION     • COLONOSCOPY  2019 DEC    • ENDOSCOPY     • OOPHORECTOMY Right    • SKIN CANCER EXCISION      melanoma   • TONSILLECTOMY AND ADENOIDECTOMY     • TOTAL LAPAROSCOPIC HYSTERECTOMY SALPINGO OOPHORECTOMY Left 8/3/2020    Procedure: TOTAL LAPAROSCOPIC HYSTERECTOMY LEFT SALPINGOOPHORECTOMY WITH DAVINCI ROBOT;  Surgeon: Ne Marmolejo MD;  Location: [facility];  Service: [facility];  Laterality: Left;       Family History   Problem Relation Age of Onset   • Heart disease Mother    • Colon cancer Father    • Breast cancer Neg Hx    • Ovarian  cancer Neg Hx        Social History     Socioeconomic History   • Marital status:      Spouse name: Not on file   • Number of children: 2   • Years of education: Not on file   • Highest education level: Not on file   Tobacco Use   • Smoking status: Former Smoker     Packs/day: 1.00     Years: 20.00     Pack years: 20.00     Types: Cigarettes     Last attempt to quit: 2006     Years since quittin.0   • Smokeless tobacco: Never Used   Substance and Sexual Activity   • Alcohol use: Never     Frequency: Never     Drinks per session: 1 or 2   • Drug use: Never   • Sexual activity: Defer           Objective   Physical Exam   Constitutional: She is oriented to person, place, and time. She appears well-developed and well-nourished.  Non-toxic appearance. No distress.   HENT:   Head: Normocephalic and atraumatic.   Right Ear: External ear normal.   Left Ear: External ear normal.   Nose: Nose normal.   Eyes: Pupils are equal, round, and reactive to light. EOM and lids are normal.   Neck: Normal range of motion. Neck supple. No tracheal deviation present.   Cardiovascular: Regular rhythm and normal heart sounds. Tachycardia present. Exam reveals no gallop, no friction rub and no decreased pulses.   No murmur heard.  Pulmonary/Chest: Effort normal and breath sounds normal. No respiratory distress. She has no decreased breath sounds. She has no wheezes. She has no rhonchi. She has no rales.   Abdominal: Soft. Normal appearance and bowel sounds are normal. She exhibits no distension and no fluid wave. There is tenderness in the right upper quadrant and right lower quadrant. There is no rebound, no guarding, no tenderness at McBurney's point and negative Sims's sign.       Musculoskeletal: Normal range of motion. She exhibits no deformity.   Lymphadenopathy:     She has no cervical adenopathy.   Neurological: She is alert and oriented to person, place, and time. She has normal strength. No cranial nerve deficit  or sensory deficit.   Skin: Skin is warm and dry. No rash noted. She is not diaphoretic.   Psychiatric: She has a normal mood and affect. Her speech is normal and behavior is normal. Judgment and thought content normal. Cognition and memory are normal.   Nursing note and vitals reviewed.      Procedures           ED Course                                           MDM  Number of Diagnoses or Management Options  Acute dehydration: new and requires workup  Lactic acidosis: new and requires workup  Sepsis without acute organ dysfunction, due to unspecified organism (CMS/HCC): new and requires workup  Diagnosis management comments: Patient presents with the complaint of right upper quadrant abdominal pain.  No definitive abnormality was reported by the radiologist on CT scan.  Significant colonic diverticuli are present on CT.    With the patient's associated temperature of 99.8, tachycardia, leukocytosis, and lactic acidosis I have concern that underlying infection such as diverticulitis may be present.  Blood cultures will be drawn and broad-spectrum antibiotics in the form of Zosyn and vancomycin initiated.  2 L of IV fluid have been ordered.    I discussed the patient with the hospitalist, Dr. Haile, who will admit.           Amount and/or Complexity of Data Reviewed  Clinical lab tests: ordered and reviewed  Tests in the radiology section of CPT®: ordered and reviewed  Decide to obtain previous medical records or to obtain history from someone other than the patient: yes  Review and summarize past medical records: yes  Independent visualization of images, tracings, or specimens: yes        Final diagnoses:   Sepsis without acute organ dysfunction, due to unspecified organism (CMS/HCC)   Acute dehydration   Lactic acidosis            Malgorzata Torres MD  08/20/20 1599

## 2020-08-20 NOTE — PROGRESS NOTES
[facility]  PROGRESS NOTE    Patient Name: Bhargavi Mohr  : 1965  MRN: [id number]    Date of Admission: 2020  Primary Care Physician: Charu Jordan MD    Subjective   Subjective     CC:  Abdominal pain and diarrhea    HPI:  Says crampy abdominal pain start in RLQ and radiates to LLQ. Dark, liquid stool for two days. No fever, but some chills. Just finished Keflex yesterday. Prior history of C diff (approx 10 years ago).  Has been taking ibuprofen 600 mg once daily for pain.    Review of Systems  Gen- + chills  CV- No chest pain, palpitations  Resp- No cough, dyspnea  GI- + diarrhea and abdominal pain        Objective   Objective     Vital Signs:   Temp:  [98.6 °F (37 °C)-99.8 °F (37.7 °C)] 98.6 °F (37 °C)  Heart Rate:  [103-117] 115  Resp:  [20] 20  BP: (138-155)/(82-91) 138/82        Physical Exam:  Constitutional -appears fatigued, in bed  HEENT-NCAT, mucous membranes moist  CV-Tachycardic, regular  Resp-CTAB, no wheezes, rhonchi or rales  Abd-soft, mild tenderness in lower quadrants but no rebound or guarding, non-distended, normo active bowel sounds  Ext-No lower extremity cyanosis, clubbing or edema bilaterally  Neuro-alert and oriented, speech clear, moves all extremities   Psych-normal affect   Skin- port sites on abdomen appear well healed with no surrounding erythema or drainage.      Results Reviewed:  Results from last 7 days   Lab Units 20  0302   WBC 10*3/mm3 21.32*   HEMOGLOBIN g/dL 12.9   HEMATOCRIT % 39.0   PLATELETS 10*3/mm3 326     Results from last 7 days   Lab Units 20  0302   SODIUM mmol/L 137   POTASSIUM mmol/L 3.1*   CHLORIDE mmol/L 101   CO2 mmol/L 19.0*   BUN mg/dL 21*   CREATININE mg/dL 1.12*   GLUCOSE mg/dL 239*   CALCIUM mg/dL 9.6   ALT (SGPT) U/L 23   AST (SGOT) U/L 20     Estimated Creatinine Clearance: 58.8 mL/min (A) (by C-G formula based on SCr of 1.12 mg/dL (H)).    Microbiology Results Abnormal     None           Imaging Results (Last 24 Hours)     Procedure Component Value Units Date/Time    CT Abdomen Pelvis Without Contrast [[id number]] Collected:  08/20/20 0327     Updated:  08/20/20 0329    Narrative:       CT Abdomen Pelvis WO    INDICATION:   Abdominal pain all over for 3 days    TECHNIQUE:   CT of the abdomen and pelvis without IV contrast. Coronal and sagittal reconstructions were obtained.  Radiation dose reduction techniques included automated exposure control or exposure modulation based on body size. Count of known CT and cardiac nuc  med studies performed in previous 12 months: 0.     COMPARISON:   None available.    FINDINGS:  Abdomen: Lung bases are clear the liver, gallbladder, spleen, pancreas, adrenal glands and right kidney normal.    The left kidney has an oval lesion in the midportion of the kidney. This is just anterior to the renal parenchyma. It measures about 17 mm maximum dimension and is partially calcified. This is likely a complex cyst. The aneurysm is less likely.    The aorta is normal in size. There is no adenopathy. The bowel shows numerous left colon diverticuli. There seems be some mild inflammatory stranding in the left side of the pelvis. I cannot clearly identify any site of diverticulitis there is no colon  wall thickening identified.    Pelvis: Bladder is normal. The uterus is been removed. There are no adnexal masses. Bones are unremarkable.      Impression:       Partially calcified 17 mm left renal lesion. This could represent a complex cyst, solid mass within the aneurysm. Follow-up as an outpatient with multiphase renal CT scan is recommended.    Numerous colonic diverticuli    This seems be some subtle inflammatory change in the left side of the pelvis. This is best appreciated on image 72. This could possibly represent focal colitis or diverticulitis.          Signer Name: Javier Lima MD   Signed: 8/20/2020 3:27 AM   Workstation Name: [email or web address]    [facility]               I have reviewed the medications:  Scheduled Meds:  metroNIDAZOLE 500 mg Intravenous Q8H   vancomycin 250 mg Oral Q6H     Continuous Infusions:  Pharmacy Consult    sodium chloride 125 mL/hr     PRN Meds:.•  magnesium sulfate **OR** magnesium sulfate **OR** magnesium sulfate  •  Pharmacy Consult  •  potassium chloride **OR** potassium chloride **OR** potassium chloride  •  sodium chloride    Assessment/Plan   Assessment & Plan     Active Hospital Problems    Diagnosis  POA   • **Sepsis without acute organ dysfunction (CMS/HCC) [A41.9]  Yes   • Type 2 diabetes mellitus (CMS/HCC) [E11.9]  Yes   • History of melanoma [Z85.820]  Not Applicable   • Essential hypertension [I10]  Yes   • Diverticulosis [K57.90]  Yes   • Hypokalemia [E87.6]  Unknown   • HERO (acute kidney injury) (CMS/HCC) [N17.9]  Unknown      Resolved Hospital Problems   No resolved problems to display.        Brief Hospital Course to date:  Bhargavi Mohr is a 54 y.o. female with history of anxiety, DL, HTN, diverticulosis, DMII, prior C diff colitis and melanoma, with recent laparoscopic hysterectomy and slapingoophorectomy (8/3/20 Dr Marmolejo), who just finished a course of keflex for redness and drainage at one of her laparoscopic incision sites, presents with chills, abdominal pain and loose dark stool.    Sepsis  - reviewed CT imaging with Gyn Onc -- no changes seen that would specifically relate to recent surgery  - given recent antibiotic use and history of C diff, suspect recurrent C diff.  - suspect dark color of stool may be secondary to recent NSAID use and possible gastritis/PUD. However, will defer PPI at this time due to possible clostridial infection.  --- continue IV flagyl  --- added oral vanc while C diff PCR pending  --- IV fluid hydration  --- CBC/CMP am    Renal insufficiency  - creatinine 1.12  - received bolus IVF in ED, continue at brisk rate    Lactic acidosis  - IV fluids, reflex lactate  pending    DMII    HTN  - HCTZ and lisinopril held    Anxiety      DVT Prophylaxis:  mechanical      Disposition: I expect the patient to be discharged tbd      CODE STATUS:   Code Status and Medical Interventions:   Ordered at: 08/20/20 0601     Level Of Support Discussed With:    Patient     Code Status:    CPR     Medical Interventions (Level of Support Prior to Arrest):    Full         Electronically signed by Elieser Medrano MD, 08/20/20, 09:15.

## 2020-08-20 NOTE — CONSULTS
Bhargavi Mohr  [id number]  1965        Reason for visit:  Postoperative admission, favor C. difficile colitis as underlying etiology for sepsis     Consultation:  Patient is being seen at the request of Dr. Mitchell Mon, hospitalist     History of present illness:  The patient is a 54 y.o. year old female who underwent robotic assisted total laparoscopic hysterectomy, left salpingo-oophorectomy due to recurrent cervical dysplasia on 8/3/2020.  Surgery was free from complication.  Unfortunately, patient developed soft tissue superficial infection at the right upper quadrant trocar site.  She underwent administration of Keflex and office evaluation.  She was doing better until yesterday when she developed severe abdominal pain.  She does have a history of diverticulitis and apparently history of C. difficile related to antibiotics used to treat diverticulitis.  She states that her symptoms of pain were worse than they typically are with these issues.  She thinks that the pain is mainly at the right lower quadrant and radiates to the back in the left lower abdomen.  At presentation it was severe and sounded more like tenesmus.  Medication was improved with IV morphine.  She is quite comfortable presently.        Oncologic History:    No history exists.          Medical History        Past Medical History:   Diagnosis Date   • Anxiety     • Diabetes (CMS/HCC)     • Diverticulitis     • History of right salpingo-oophorectomy 12/2018   • Hypertension     • Melanoma (CMS/HCC)     • Positive TB test              Surgical History         Past Surgical History:   Procedure Laterality Date   • APPENDECTOMY       • CERVICAL CONIZATION       • SKIN CANCER EXCISION         melanoma   • TONSILLECTOMY AND ADENOIDECTOMY                MEDICATIONS: The current medication list was reviewed with the patient and updated in the EMR this date per the Medical Assistant. Medication dosages and frequencies were confirmed to be  accurate.       Allergies:  is allergic to codeine and percocet [oxycodone-acetaminophen].     Social History:   Social History   Social History            Socioeconomic History   • Marital status:        Spouse name: Not on file   • Number of children: 2   • Years of education: Not on file   • Highest education level: Not on file   Tobacco Use   • Smoking status: Former Smoker       Packs/day: 1.00       Years: 20.00       Pack years: 20.00   Substance and Sexual Activity   • Drug use: Never   • Sexual activity: Defer            Family History:          Family History   Problem Relation Age of Onset   • Heart disease Mother     • Colon cancer Father     • Breast cancer Neg Hx     • Ovarian cancer Neg Hx           Health Maintenance:         Health Maintenance   Topic Date Due   • ANNUAL PHYSICAL  08/23/1969   • TDAP/TD VACCINES (1 - Tdap) 08/23/1977   • ZOSTER VACCINE (1 of 2) 08/23/2016   • HEPATITIS C SCREENING  12/20/2018   • PAP SMEAR  12/20/2018   • COLONOSCOPY  12/20/2018   • INFLUENZA VACCINE  08/01/2020   • MAMMOGRAM  05/15/2021      Review of Systems   Constitutional: Negative for appetite change, chills, fatigue, fever and unexpected weight change.   HENT: Negative for congestion, hearing loss and sore throat.    Eyes: Negative for redness and visual disturbance.   Respiratory: Negative for cough, shortness of breath and wheezing.    Cardiovascular: Negative for chest pain, palpitations and leg swelling.   Gastrointestinal: Negative for abdominal distention, abdominal pain, blood in stool, constipation, diarrhea, nausea and vomiting.   Endocrine: Negative.  Negative for cold intolerance and heat intolerance (Hot flashes).   Genitourinary: Negative for difficulty urinating, dyspareunia, dysuria, frequency, genital sores, hematuria, pelvic pain, urgency, vaginal bleeding, vaginal discharge and vaginal pain.   Musculoskeletal: Negative for arthralgias, back pain, gait problem and joint swelling.  "  Skin: Negative for rash and wound.   Allergic/Immunologic: Negative for food allergies and immunocompromised state.   Neurological: Negative for dizziness, seizures, syncope, weakness, light-headedness, numbness and headaches.   Hematological: Negative for adenopathy. Does not bruise/bleed easily.   Psychiatric/Behavioral: Positive for dysphoric mood and sleep disturbance. Negative for confusion. The patient is nervous/anxious.       Physical Exam     Vitals       Vitals:     07/16/20 1259   BP: 154/70   Pulse: 79   Resp: 14   Temp: 99.3 °F (37.4 °C)   TempSrc: Temporal   SpO2: 97%   Weight: 75.3 kg (166 lb)   Height: 165.1 cm (65\")   PainSc: 0-No pain            Body mass index is 27.62 kg/m².         Wt Readings from Last 3 Encounters:   07/16/20 75.3 kg (166 lb)   04/10/17 74.4 kg (164 lb)   03/13/17 76.2 kg (168 lb)            GENERAL: Alert, well-appearing female appearing her stated age who is in no apparent distress.   HEENT: Sclera anicteric. Head normocephalic, atraumatic. Mucus membranes moist.   NECK: Deferred  GASTROINTESTINAL:  Abdomen is soft, minimally tender, non-distended, no rebound or guarding, no masses, or hernias. No HSM.  Incisions are clean dry and intact without evidence of cellulitis.  SKIN:  Warm, dry, well-perfused.  All visible areas intact.  No rashes, lesions, ulcers.  PSYCHIATRIC: AO x3, with appropriate affect, normal thought processes.  NEUROLOGIC: Resting in bed, grossly intact.  MUSCULOSKELETAL: Resting in bed, grossly intact.   EXTREMITIES:   No cyanosis, clubbing, symmetric.  LYMPHATICS:   Deferred                PELVIC exam:    Deferred     Diagnostic Data:    Ct Abdomen Pelvis Without Contrast    Result Date: 8/20/2020  Partially calcified 17 mm left renal lesion. This could represent a complex cyst, solid mass within the aneurysm. Follow-up as an outpatient with multiphase renal CT scan is recommended. Numerous colonic diverticuli This seems be some subtle inflammatory " change in the left side of the pelvis. This is best appreciated on image 72. This could possibly represent focal colitis or diverticulitis. Signer Name: Javier Lima MD  Signed: 8/20/2020 3:27 AM  Workstation Name: [email or web address]  [facility]    WBC   Date Value Ref Range Status   08/20/2020 12.35 (H) 3.40 - 10.80 10*3/mm3 Final     RBC   Date Value Ref Range Status   08/20/2020 4.02 3.77 - 5.28 10*6/mm3 Final     Hemoglobin   Date Value Ref Range Status   08/20/2020 12.1 12.0 - 15.9 g/dL Final     Hematocrit   Date Value Ref Range Status   08/20/2020 37.5 34.0 - 46.6 % Final     MCV   Date Value Ref Range Status   08/20/2020 93.3 79.0 - 97.0 fL Final     MCH   Date Value Ref Range Status   08/20/2020 30.1 26.6 - 33.0 pg Final     MCHC   Date Value Ref Range Status   08/20/2020 32.3 31.5 - 35.7 g/dL Final     RDW   Date Value Ref Range Status   08/20/2020 12.2 (L) 12.3 - 15.4 % Final     RDW-SD   Date Value Ref Range Status   08/20/2020 42.3 37.0 - 54.0 fl Final     MPV   Date Value Ref Range Status   08/20/2020 10.2 6.0 - 12.0 fL Final     Platelets   Date Value Ref Range Status   08/20/2020 271 140 - 450 10*3/mm3 Final     Neutrophil %   Date Value Ref Range Status   08/20/2020 85.8 (H) 42.7 - 76.0 % Final     Lymphocyte %   Date Value Ref Range Status   08/20/2020 8.2 (L) 19.6 - 45.3 % Final     Monocyte %   Date Value Ref Range Status   08/20/2020 4.3 (L) 5.0 - 12.0 % Final     Eosinophil %   Date Value Ref Range Status   08/20/2020 0.8 0.3 - 6.2 % Final     Basophil %   Date Value Ref Range Status   08/20/2020 0.3 0.0 - 1.5 % Final     Immature Grans %   Date Value Ref Range Status   08/20/2020 0.6 (H) 0.0 - 0.5 % Final     Neutrophils Absolute   Date Value Ref Range Status   08/20/2020 10.25 (H) 1.70 - 7.00 10*3/mm3 Final     Neutrophils, Absolute   Date Value Ref Range Status   08/20/2020 18.31 (H) 1.70 - 7.00 10*3/mm3 Final     Lymphocytes, Absolute   Date Value Ref Range Status    08/20/2020 1.74 0.70 - 3.10 10*3/mm3 Final     Monocytes, Absolute   Date Value Ref Range Status   08/20/2020 0.92 (H) 0.10 - 0.90 10*3/mm3 Final     Eosinophils Absolute   Date Value Ref Range Status   08/20/2020 0.12 0.00 - 0.40 10*3/mm3 Final     Eosinophils, Absolute   Date Value Ref Range Status   08/20/2020 0.16 0.00 - 0.40 10*3/mm3 Final     Basophils Absolute   Date Value Ref Range Status   08/20/2020 0.12 0.00 - 0.20 10*3/mm3 Final     Basophils, Absolute   Date Value Ref Range Status   08/20/2020 0.07 0.00 - 0.20 10*3/mm3 Final     Immature Grans, Absolute   Date Value Ref Range Status   08/20/2020 0.12 (H) 0.00 - 0.05 10*3/mm3 Final     nRBC   Date Value Ref Range Status   08/20/2020 0.0 0.0 - 0.2 /100 WBC Final               Assessment/Plan      This is a 54 y.o. woman status post robotic assisted total laparoscopic hysterectomy, left salpingo-oophorectomy for recurrent cervical dysplasia.  I personally reviewed CT images.  I do not see anything that is concerning regarding postoperative fluid collection, intestinal perforation, or damage to the renal/bladder system.  I agree that this is most likely C. difficile related given that patient had recent antibiotics and loose stools.  I will socially follow.  Please call with questions or concerns over the weekend as I am not on service.    Electronically signed by Ne Marmolejo MD, 08/20/20, 6:07 PM.

## 2020-08-20 NOTE — PROGRESS NOTES
Discharge Planning Assessment  [facility]     Patient Name: Bhargavi Mohr  MRN: [id number]  Today's Date: 8/20/2020    Admit Date: 8/20/2020    Discharge Needs Assessment     Row Name 08/20/20 1034       Living Environment    Lives With  significant other    Name(s) of Who Lives With Patient  LES Dozier [phone]    Current Living Arrangements  home/apartment/condo    Primary Care Provided by  self    Provides Primary Care For  no one    Family Caregiver if Needed  significant other    Family Caregiver Names  LES Dozier [phone]    Quality of Family Relationships  helpful;involved;supportive    Able to Return to Prior Arrangements  yes       Resource/Environmental Concerns    Resource/Environmental Concerns  none    Transportation Concerns  car, none       Transition Planning    Patient/Family Anticipates Transition to  home with family    Patient/Family Anticipated Services at Transition  none    Transportation Anticipated  family or friend will provide       Discharge Needs Assessment    Readmission Within the Last 30 Days  no previous admission in last 30 days    Concerns to be Addressed  denies needs/concerns at this time    Equipment Currently Used at Home  glucometer    Anticipated Changes Related to Illness  none    Equipment Needed After Discharge  none        Discharge Plan     Row Name 08/20/20 1035       Plan    Plan  Initial    Plan Comments  CM spoke with patient at bedside. Patient resides in [facility] with her Thiago SALDANA. Patient is indpendent with ADL's. Patient has a glucometer in her home. Patient denies any current HH or OP services. Patient denies any discharge planning needs at this time. Patient states her plan is home. CM following.     Final Discharge Disposition Code  30 - still a patient               Demographic Summary     Row Name 08/20/20 103       General Information    Arrived From  home    Referral Source  emergency department    Reason for Consult  discharge  planning    Preferred Language  English     Used During This Interaction  no    General Information Comments  PCP: Charu Jordan       Contact Information    Contact Information Comments  LES Das - [phone]        Functional Status     Row Name 08/20/20 1033       Functional Status    Usual Activity Tolerance  good    Current Activity Tolerance  good       Functional Status, IADL    Medications  independent    Meal Preparation  independent    Housekeeping  independent    Laundry  independent    Shopping  independent       Mental Status    General Appearance WDL  WDL       Mental Status Summary    Recent Changes in Mental Status/Cognitive Functioning  no changes       Employment/    Employment Status  employed full time    Employment/ Comments  Patient states she is able to afford/obtain her medications without difficulty                Bridget Samuel RN

## 2020-08-21 LAB
ADV 40+41 DNA STL QL NAA+NON-PROBE: NOT DETECTED
ALBUMIN SERPL-MCNC: 3.2 G/DL (ref 3.5–5.2)
ALBUMIN/GLOB SERPL: 1.2 G/DL
ALP SERPL-CCNC: 66 U/L (ref 39–117)
ALT SERPL W P-5'-P-CCNC: 16 U/L (ref 1–33)
ANION GAP SERPL CALCULATED.3IONS-SCNC: 8 MMOL/L (ref 5–15)
ANION GAP SERPL CALCULATED.3IONS-SCNC: 8 MMOL/L (ref 5–15)
AST SERPL-CCNC: 23 U/L (ref 1–32)
ASTRO TYP 1-8 RNA STL QL NAA+NON-PROBE: NOT DETECTED
BASOPHILS # BLD AUTO: 0.05 10*3/MM3 (ref 0–0.2)
BASOPHILS NFR BLD AUTO: 0.5 % (ref 0–1.5)
BILIRUB SERPL-MCNC: 0.4 MG/DL (ref 0–1.2)
BUN SERPL-MCNC: 16 MG/DL (ref 6–20)
BUN SERPL-MCNC: 16 MG/DL (ref 6–20)
BUN/CREAT SERPL: 10.5 (ref 7–25)
BUN/CREAT SERPL: 9.8 (ref 7–25)
C CAYETANENSIS DNA STL QL NAA+NON-PROBE: NOT DETECTED
C DIFF TOX GENS STL QL NAA+PROBE: NOT DETECTED
CALCIUM SPEC-SCNC: 8.5 MG/DL (ref 8.6–10.5)
CALCIUM SPEC-SCNC: 8.7 MG/DL (ref 8.6–10.5)
CAMPY SP DNA.DIARRHEA STL QL NAA+PROBE: NOT DETECTED
CHLORIDE SERPL-SCNC: 113 MMOL/L (ref 98–107)
CHLORIDE SERPL-SCNC: 113 MMOL/L (ref 98–107)
CO2 SERPL-SCNC: 22 MMOL/L (ref 22–29)
CO2 SERPL-SCNC: 23 MMOL/L (ref 22–29)
CREAT SERPL-MCNC: 1.53 MG/DL (ref 0.57–1)
CREAT SERPL-MCNC: 1.64 MG/DL (ref 0.57–1)
CRYPTOSP STL CULT: NOT DETECTED
DEPRECATED RDW RBC AUTO: 43.7 FL (ref 37–54)
E COLI DNA SPEC QL NAA+PROBE: NOT DETECTED
E HISTOLYT AG STL-ACNC: NOT DETECTED
EAEC PAA PLAS AGGR+AATA ST NAA+NON-PRB: NOT DETECTED
EC STX1 + STX2 GENES STL NAA+PROBE: NOT DETECTED
EOSINOPHIL # BLD AUTO: 0.36 10*3/MM3 (ref 0–0.4)
EOSINOPHIL NFR BLD AUTO: 3.3 % (ref 0.3–6.2)
EPEC EAE GENE STL QL NAA+NON-PROBE: NOT DETECTED
ERYTHROCYTE [DISTWIDTH] IN BLOOD BY AUTOMATED COUNT: 12.4 % (ref 12.3–15.4)
ETEC LTA+ST1A+ST1B TOX ST NAA+NON-PROBE: NOT DETECTED
G LAMBLIA DNA SPEC QL NAA+PROBE: NOT DETECTED
GFR SERPL CREATININE-BSD FRML MDRD: 33 ML/MIN/1.73
GFR SERPL CREATININE-BSD FRML MDRD: 36 ML/MIN/1.73
GLOBULIN UR ELPH-MCNC: 2.6 GM/DL
GLUCOSE BLDC GLUCOMTR-MCNC: 112 MG/DL (ref 70–130)
GLUCOSE BLDC GLUCOMTR-MCNC: 151 MG/DL (ref 70–130)
GLUCOSE BLDC GLUCOMTR-MCNC: 175 MG/DL (ref 70–130)
GLUCOSE BLDC GLUCOMTR-MCNC: 97 MG/DL (ref 70–130)
GLUCOSE SERPL-MCNC: 118 MG/DL (ref 65–99)
GLUCOSE SERPL-MCNC: 127 MG/DL (ref 65–99)
HCT VFR BLD AUTO: 35 % (ref 34–46.6)
HEMOCCULT STL QL: POSITIVE
HGB BLD-MCNC: 11 G/DL (ref 12–15.9)
IMM GRANULOCYTES # BLD AUTO: 0.05 10*3/MM3 (ref 0–0.05)
IMM GRANULOCYTES NFR BLD AUTO: 0.5 % (ref 0–0.5)
LYMPHOCYTES # BLD AUTO: 1.34 10*3/MM3 (ref 0.7–3.1)
LYMPHOCYTES NFR BLD AUTO: 12.2 % (ref 19.6–45.3)
MAGNESIUM SERPL-MCNC: 1.9 MG/DL (ref 1.6–2.6)
MCH RBC QN AUTO: 30.1 PG (ref 26.6–33)
MCHC RBC AUTO-ENTMCNC: 31.4 G/DL (ref 31.5–35.7)
MCV RBC AUTO: 95.6 FL (ref 79–97)
MONOCYTES # BLD AUTO: 0.81 10*3/MM3 (ref 0.1–0.9)
MONOCYTES NFR BLD AUTO: 7.4 % (ref 5–12)
NEUTROPHILS NFR BLD AUTO: 76.1 % (ref 42.7–76)
NEUTROPHILS NFR BLD AUTO: 8.4 10*3/MM3 (ref 1.7–7)
NOROVIRUS GI+II RNA STL QL NAA+NON-PROBE: NOT DETECTED
NRBC BLD AUTO-RTO: 0 /100 WBC (ref 0–0.2)
P SHIGELLOIDES DNA STL QL NAA+PROBE: NOT DETECTED
PLATELET # BLD AUTO: 268 10*3/MM3 (ref 140–450)
PMV BLD AUTO: 10.5 FL (ref 6–12)
POTASSIUM SERPL-SCNC: 4.5 MMOL/L (ref 3.5–5.2)
POTASSIUM SERPL-SCNC: 4.8 MMOL/L (ref 3.5–5.2)
PROT SERPL-MCNC: 5.8 G/DL (ref 6–8.5)
RBC # BLD AUTO: 3.66 10*6/MM3 (ref 3.77–5.28)
RV RNA STL NAA+PROBE: NOT DETECTED
SALMONELLA DNA SPEC QL NAA+PROBE: NOT DETECTED
SAPO I+II+IV+V RNA STL QL NAA+NON-PROBE: NOT DETECTED
SHIGELLA SP+EIEC IPAH STL QL NAA+PROBE: NOT DETECTED
SODIUM SERPL-SCNC: 143 MMOL/L (ref 136–145)
SODIUM SERPL-SCNC: 144 MMOL/L (ref 136–145)
TROPONIN T SERPL-MCNC: <0.01 NG/ML (ref 0–0.03)
V CHOLERAE DNA SPEC QL NAA+PROBE: NOT DETECTED
VIBRIO DNA SPEC NAA+PROBE: NOT DETECTED
WBC # BLD AUTO: 11.01 10*3/MM3 (ref 3.4–10.8)
WBC STL QL MICRO: NORMAL
YERSINIA STL CULT: NOT DETECTED

## 2020-08-21 PROCEDURE — 87493 C DIFF AMPLIFIED PROBE: CPT | Performed by: FAMILY MEDICINE

## 2020-08-21 PROCEDURE — 93005 ELECTROCARDIOGRAM TRACING: CPT | Performed by: INTERNAL MEDICINE

## 2020-08-21 PROCEDURE — 80048 BASIC METABOLIC PNL TOTAL CA: CPT | Performed by: NURSE PRACTITIONER

## 2020-08-21 PROCEDURE — 85025 COMPLETE CBC W/AUTO DIFF WBC: CPT | Performed by: INTERNAL MEDICINE

## 2020-08-21 PROCEDURE — 0097U HC BIOFIRE FILMARRAY GI PANEL: CPT | Performed by: INTERNAL MEDICINE

## 2020-08-21 PROCEDURE — 63710000001 INSULIN LISPRO (HUMAN) PER 5 UNITS: Performed by: NURSE PRACTITIONER

## 2020-08-21 PROCEDURE — 80053 COMPREHEN METABOLIC PANEL: CPT | Performed by: INTERNAL MEDICINE

## 2020-08-21 PROCEDURE — 25810000003 SODIUM CHLORIDE 0.9 % WITH KCL 20 MEQ 20-0.9 MEQ/L-% SOLUTION: Performed by: INTERNAL MEDICINE

## 2020-08-21 PROCEDURE — 82962 GLUCOSE BLOOD TEST: CPT

## 2020-08-21 PROCEDURE — 25010000002 PIPERACILLIN SOD-TAZOBACTAM PER 1 G: Performed by: NURSE PRACTITIONER

## 2020-08-21 PROCEDURE — 87045 FECES CULTURE AEROBIC BACT: CPT | Performed by: INTERNAL MEDICINE

## 2020-08-21 PROCEDURE — 83735 ASSAY OF MAGNESIUM: CPT | Performed by: NURSE PRACTITIONER

## 2020-08-21 PROCEDURE — 87046 STOOL CULTR AEROBIC BACT EA: CPT | Performed by: INTERNAL MEDICINE

## 2020-08-21 PROCEDURE — 84484 ASSAY OF TROPONIN QUANT: CPT | Performed by: INTERNAL MEDICINE

## 2020-08-21 PROCEDURE — 99232 SBSQ HOSP IP/OBS MODERATE 35: CPT | Performed by: INTERNAL MEDICINE

## 2020-08-21 PROCEDURE — 87427 SHIGA-LIKE TOXIN AG IA: CPT | Performed by: INTERNAL MEDICINE

## 2020-08-21 PROCEDURE — 87205 SMEAR GRAM STAIN: CPT | Performed by: INTERNAL MEDICINE

## 2020-08-21 PROCEDURE — 82272 OCCULT BLD FECES 1-3 TESTS: CPT | Performed by: FAMILY MEDICINE

## 2020-08-21 PROCEDURE — 25010000002 TIGECYCLINE PER 1 MG: Performed by: INTERNAL MEDICINE

## 2020-08-21 RX ORDER — LABETALOL HYDROCHLORIDE 5 MG/ML
10 INJECTION, SOLUTION INTRAVENOUS EVERY 6 HOURS PRN
Status: DISCONTINUED | OUTPATIENT
Start: 2020-08-21 | End: 2020-08-25 | Stop reason: HOSPADM

## 2020-08-21 RX ORDER — L.ACID,PARA/B.BIFIDUM/S.THERM 8B CELL
1 CAPSULE ORAL DAILY
Status: DISCONTINUED | OUTPATIENT
Start: 2020-08-21 | End: 2020-08-25 | Stop reason: HOSPADM

## 2020-08-21 RX ORDER — SODIUM CHLORIDE 9 MG/ML
50 INJECTION, SOLUTION INTRAVENOUS CONTINUOUS
Status: DISCONTINUED | OUTPATIENT
Start: 2020-08-21 | End: 2020-08-24

## 2020-08-21 RX ADMIN — SODIUM CHLORIDE 125 ML/HR: 9 INJECTION, SOLUTION INTRAVENOUS at 10:42

## 2020-08-21 RX ADMIN — VANCOMYCIN HYDROCHLORIDE 250 MG: KIT at 04:27

## 2020-08-21 RX ADMIN — INSULIN LISPRO 2 UNITS: 100 INJECTION, SOLUTION INTRAVENOUS; SUBCUTANEOUS at 17:24

## 2020-08-21 RX ADMIN — VANCOMYCIN HYDROCHLORIDE 250 MG: KIT at 09:03

## 2020-08-21 RX ADMIN — ACETAMINOPHEN 650 MG: 325 TABLET, FILM COATED ORAL at 09:03

## 2020-08-21 RX ADMIN — ROSUVASTATIN CALCIUM 40 MG: 20 TABLET, COATED ORAL at 09:03

## 2020-08-21 RX ADMIN — SODIUM CHLORIDE 125 ML/HR: 9 INJECTION, SOLUTION INTRAVENOUS at 19:35

## 2020-08-21 RX ADMIN — TAZOBACTAM SODIUM AND PIPERACILLIN SODIUM 3.38 G: 375; 3 INJECTION, SOLUTION INTRAVENOUS at 04:26

## 2020-08-21 RX ADMIN — Medication 1 CAPSULE: at 21:47

## 2020-08-21 RX ADMIN — SODIUM CHLORIDE 50 MG: 900 INJECTION INTRAVENOUS at 21:47

## 2020-08-21 RX ADMIN — CALCIUM CARBONATE 750 MG: 750 TABLET ORAL at 17:24

## 2020-08-21 RX ADMIN — FLUOXETINE HYDROCHLORIDE 20 MG: 20 CAPSULE ORAL at 09:03

## 2020-08-21 RX ADMIN — SODIUM CHLORIDE 50 MG: 900 INJECTION INTRAVENOUS at 14:32

## 2020-08-21 RX ADMIN — ACETAMINOPHEN 650 MG: 325 TABLET, FILM COATED ORAL at 23:00

## 2020-08-21 RX ADMIN — METRONIDAZOLE 500 MG: 500 INJECTION, SOLUTION INTRAVENOUS at 06:40

## 2020-08-21 RX ADMIN — POTASSIUM CHLORIDE AND SODIUM CHLORIDE 125 ML/HR: 900; 150 INJECTION, SOLUTION INTRAVENOUS at 06:40

## 2020-08-21 NOTE — PROGRESS NOTES
[facility]  PROGRESS NOTE    Patient Name: Bhargavi Mohr  : 1965  MRN: [id number]    Date of Admission: 2020  Primary Care Physician: Charu Jordan MD    Subjective   Subjective     CC:  Abdominal pain and diarrhea    HPI:  Still having loose stool, watery, and some abdominal cramping, but overall feels much better. Eating well, glad to be on a regular diet now.    Review of Systems  Gen- No fevers, chills  CV- No chest pain, palpitations  Resp- No cough, dyspnea  GI- + diarrhea and abdominal cramping as above        Objective   Objective     Vital Signs:   Temp:  [98.1 °F (36.7 °C)-98.6 °F (37 °C)] 98.1 °F (36.7 °C)  Heart Rate:  [76-89] 86  Resp:  [18] 18  BP: (143-164)/() 158/93        Physical Exam:  Constitutional -no acute distress, non toxic, in bed  HEENT-NCAT, mucous membranes moist  CV-RRR, S1 S2 normal, no m/r/g  Resp-CTAB, no wheezes, rhonchi or rales  Abd-soft, non-tender, non-distended, normo active bowel sounds  Ext-No lower extremity cyanosis, clubbing or edema bilaterally  Neuro-alert and oriented, speech clear, moves all extremities   Psych-normal affect   Skin- No rash on exposed UE or LE bilaterally    Results Reviewed:  Results from last 7 days   Lab Units 20  0407 20  1242 20  0302   WBC 10*3/mm3 11.01* 12.35* 21.32*   HEMOGLOBIN g/dL 11.0* 12.1 12.9   HEMATOCRIT % 35.0 37.5 39.0   PLATELETS 10*3/mm3 268 271 326     Results from last 7 days   Lab Units 20  0407 20  0019 20  2019  20  0302   SODIUM mmol/L 143 144 141   < > 137   POTASSIUM mmol/L 4.8 4.5 4.4   < > 3.1*   CHLORIDE mmol/L 113* 113* 111*   < > 101   CO2 mmol/L 22.0 23.0 23.0   < > 19.0*   BUN mg/dL 16 16 17   < > 21*   CREATININE mg/dL 1.53* 1.64* 1.38*   < > 1.12*   GLUCOSE mg/dL 127* 118* 148*   < > 239*   CALCIUM mg/dL 8.5* 8.7 8.5*   < > 9.6   ALT (SGPT) U/L 16  --   --   --  23   AST (SGOT) U/L 23  --   --   --  20    <  > = values in this interval not displayed.     Estimated Creatinine Clearance: 42.5 mL/min (A) (by C-G formula based on SCr of 1.53 mg/dL (H)).    Microbiology Results Abnormal     Procedure Component Value - Date/Time    Clostridium Difficile Toxin - Stool, Per Rectum [[id number]] Collected:  08/21/20 1045    Lab Status:  Final result Specimen:  Stool from Per Rectum Updated:  08/21/20 1154    Narrative:       The following orders were created for panel order Clostridium Difficile Toxin - Stool, Per Rectum.  Procedure                               Abnormality         Status                     ---------                               -----------         ------                     Clostridium Difficile To...[[id number]]  Normal              Final result                 Please view results for these tests on the individual orders.    Clostridium Difficile Toxin, PCR - Stool, Per Rectum [[id number]]  (Normal) Collected:  08/21/20 1045    Lab Status:  Final result Specimen:  Stool from Per Rectum Updated:  08/21/20 1154     C. Difficile Toxins by PCR Not Detected    Narrative:       Performance characteristics of test not established for patients <2 years of age.  Negative for Toxigenic C. Difficile    Blood Culture - Blood, Arm, Left [[id number]] Collected:  08/20/20 0322    Lab Status:  Preliminary result Specimen:  Blood from Arm, Left Updated:  08/21/20 0345     Blood Culture No growth at 24 hours    Blood Culture - Blood, Arm, Left [[id number]] Collected:  08/20/20 0322    Lab Status:  Preliminary result Specimen:  Blood from Arm, Left Updated:  08/21/20 0345     Blood Culture No growth at 24 hours          Imaging Results (Last 24 Hours)     ** No results found for the last 24 hours. **               I have reviewed the medications:  Scheduled Meds:    FLUoxetine 20 mg Oral Daily   insulin lispro 0-7 Units Subcutaneous TID AC   rosuvastatin 40 mg Oral Daily   tigecycline 50 mg Intravenous Q12H   vancomycin 250 mg Oral  Q6H     Continuous Infusions:    Pharmacy Consult     sodium chloride 125 mL/hr Last Rate: 125 mL/hr (08/21/20 1042)     PRN Meds:.•  acetaminophen  •  ALPRAZolam  •  calcium carbonate EX  •  dextrose  •  dextrose  •  glucagon (human recombinant)  •  magnesium sulfate **OR** magnesium sulfate **OR** magnesium sulfate  •  Morphine **AND** naloxone  •  Pharmacy Consult  •  potassium chloride **OR** potassium chloride **OR** potassium chloride  •  sodium chloride    Assessment/Plan   Assessment & Plan     Active Hospital Problems    Diagnosis  POA   • **Sepsis without acute organ dysfunction (CMS/HCC) [A41.9]  Yes   • Type 2 diabetes mellitus (CMS/HCC) [E11.9]  Yes   • History of melanoma [Z85.820]  Not Applicable   • Essential hypertension [I10]  Yes   • Diverticulosis [K57.90]  Yes   • Hypokalemia [E87.6]  Unknown   • HERO (acute kidney injury) (CMS/HCC) [N17.9]  Unknown      Resolved Hospital Problems   No resolved problems to display.        Brief Hospital Course to date:  Bhargavi Mohr is a 54 y.o. female with history of anxiety, DL, HTN, diverticulosis, DMII, prior C diff colitis and melanoma, with recent laparoscopic hysterectomy and slapingoophorectomy (8/3/20 Dr Marmolejo), who just finished a course of keflex for redness and drainage at one of her laparoscopic incision sites, presents with chills, abdominal pain and loose dark stool.    Sepsis  - reviewed CT imaging with Gyn Onc -- no changes seen that would specifically relate to recent surgery  - given recent antibiotic use and history of C diff, suspected recurrent C diff, however PCR negative.  - dark color of stool may be secondary to recent NSAID use and possible gastritis/PUD. However, will defer PPI at this time due to history of clostridial infection  - clinically improving (less abdominal pain, leukocytosis better), but still some diarrhea and abdominal cramping. Consider GI panel (deferred at admission to minimize resource usage as most likely  diagnosis at that time appeared to be C diff colitis).    Renal insufficiency  - creatinine 1.53 today, may be some intravascular volume depletion from loose stool  - holding lisinopril and hctz, avoid nephrotoxins (patient was taking some NSAIDs at home)  - continue IV fluids, encouraged po fluid intake  - repeat bmp am    Renal lesion  - discussed with patient, will need dedicated renal scan (with contrast as possible) as an outpatient. Follow up with PCP next week.    Lactic acidosis  - resolved    DMII  - a1c 6.5    HTN  - HCTZ and lisinopril held  - add prn labetolol    Anxiety    DVT Prophylaxis:  Mechanical, ambulate      Disposition: I expect the patient to be discharged tbd      CODE STATUS:   Code Status and Medical Interventions:   Ordered at: 08/20/20 0601     Level Of Support Discussed With:    Patient     Code Status:    CPR     Medical Interventions (Level of Support Prior to Arrest):    Full         Electronically signed by Elieser Medrano MD, 08/21/20, 13:06.

## 2020-08-21 NOTE — PROGRESS NOTES
"                  Clinical Nutrition     Reason for Visit:   Identified at risk by screening criteria, MST score 2+    Patient Name: Bhargavi Mohr  YOB: 1965  MRN: [id number]  Date of Encounter: 08/21/20 12:13  Admission date: 8/20/2020    Nutrition Assessment   Assessment     Admission diagnosis  Abdominal pain  Diarrhea x2 days prior to admission    Additional diagnosis/conditions/procedures this admission  Recent TAHBSO (8/3) - [facility] Dr. Marmolejo  F/u visit for infection    C. Diff+ r/o  Sepsis  Hypokalemia  HERO    Additional PMH/procedures:  HTN  HLP  DM2- diet/exercise controlled  Diverticulosis  C. Diff (2010)  Melanoma  Anxiety  Tobacco    Appy  Skin cancer excision    Reported/Observed/Food/Nutrition Related History:      Patient in isolation/contact precautions for c.diff r/o. RD attempted to call patient to clarify weight/intake history and MST screen. Called and spoke to patient who reports no unintentional weight loss \"out of the ordinary\" prior to admission. Upon review of EMR weight record, UBW estimated to be ~166 lbs. Prior to admission reported diarrhea, ?fluid losses. Current documented bed scale weight of 160 lbs. Patient tolerated 100% of clear liquid diet yesterday. Reports eating well. Diet advanced to soft today. Discussed preferences and encouraged patient to work with catering staff for meals.      Anthropometrics     Height: 165.1 cm (65\")  Last filed wt: Weight: 72.7 kg (160 lb 3.2 oz) (08/21/20 0605)  Weight Method: Bed scale    BMI: BMI (Calculated): 26.7  Overweight: 25.0-29.9kg/m2     Ideal Body Weight (IBW) (kg): 57.29  Admission wt: 165 lb   Method obtained: stated weight per charting 8/20    Weight Change   UBW: per EMR wt record, UBW ~166 lbs  Weight change:   % wt change:   Time frame of weight loss:     Weight Weight (kg) Weight (lbs) Weight Method VISIT REPORT   7/16/2020 75.297 kg 166 lb     7/31/2020 75.3 kg 166 lb 0.1 oz Standing scale    8/3/2020 75.297 kg 166 " lb Stated    8/10/2020 73.936 kg 163 lb     8/20/2020 74.844 kg 165 lb Stated    8/20/2020 70.171 kg 154 lb 11.2 oz Standing scale    8/21/2020 72.666 kg 160 lb 3.2 oz Bed scale        Labs reviewed     Results from last 7 days   Lab Units 08/21/20  0407 08/21/20  0019 08/20/20  2019  08/20/20  0302   GLUCOSE mg/dL 127* 118* 148*   < > 239*   BUN mg/dL 16 16 17   < > 21*   CREATININE mg/dL 1.53* 1.64* 1.38*   < > 1.12*   SODIUM mmol/L 143 144 141   < > 137   CHLORIDE mmol/L 113* 113* 111*   < > 101   POTASSIUM mmol/L 4.8 4.5 4.4   < > 3.1*   MAGNESIUM mg/dL 1.9  --   --   --  1.9   ALT (SGPT) U/L 16  --   --   --  23    < > = values in this interval not displayed.     Results from last 7 days   Lab Units 08/21/20  0407 08/20/20  0302   ALBUMIN g/dL 3.20* 4.00       Results from last 7 days   Lab Units 08/21/20  0836 08/20/20  2208 08/20/20  1725 08/20/20  1035   GLUCOSE mg/dL 112 106 136* 137*     Lab Results   Lab Value Date/Time    HGBA1C 6.50 (H) 08/20/2020 1242    HGBA1C 6.20 (H) 07/31/2020 1501       Medications reviewed   Pertinent: prozac, insulin, zosyn IV, flagyl IV  GTT: NaCl @ 125 mL/hr    Intake/Output 24 hrs (7:00AM - 6:59 AM)     Intake & Output (last day)       08/20 0701 - 08/21 0700 08/21 0701 - 08/22 0700    P.O. 360     I.V. (mL/kg) 1000 (13.8)     IV Piggyback 100     Total Intake(mL/kg) 1460 (20.1)     Net +1460           Urine Unmeasured Occurrence 2 x 2 x    Stool Unmeasured Occurrence 1 x 6 x            Nutrition Focused Physical Exam     Unable to perform exam due to: current [facility] policy, patient in isolation/contact precaution room    Current Nutrition Prescription     PO: Diet Soft Texture; [employer]; GI Soft  Intake: insuff data - 100% x 1 meal    Nutrition Diagnosis     8/21  Problem No nutrition diagnosis at this time   Etiology    Signs/Symptoms        Nutrition Intervention     1.  Follow treatment progress, Care plan reviewed  2.  Advise alternate selection, Interview for  preferences   3. Encouraged oral intake  4. Preferences communicated in message to kitchen staff    Goal:   General: Nutrition support treatment  PO: Establish PO, Tolerate PO following diet advancement      Monitoring/Evaluation:   Per protocol, PO intake, Pertinent labs, Weight, GI status, Symptoms    Will Continue to follow per protocol    Cinthia Pretty RDN, LD  Time Spent: 30 minutes

## 2020-08-21 NOTE — CONSULTS
INFECTIOUS DISEASE CONSULT/INITIAL HOSPITAL VISIT    Bhargavi Mohr  1965  [id number]    Date of Consult: 8/21/2020    Admission Date: 8/20/2020    Requesting Provider: Dr. Elieser Medrano  Evaluating Physician: Dr. Yvonne Caldwell    Reason for Consultation: Sepsis    Chief complaint: Abdominal pain    History of present illness:    Ms. Bhargavi Mohr is a 54 y.o. female who is being evaluated for sepsis.  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  She presented to the ED on 8/20 with complaints of abdominal pain.  Patient reportedly underwent a laparoscopic hysterectomy with left oophorectomy secondary to dysplasia of the cervix and high-grade EDMUND-2 on 8/3/2020.  Approximately 1 week ago she noted that 1 of the incision sites began draining and she presented to the [facility] he was prescribed Keflex.  She followed up with Dr. Guevara's office who removed the stitch and then packed the incision with gauze.  Approximately 4 days later she had another site that started to drain.  Patient states she removed the glue herself and allow the site to drain and stated that the site began to improve.  Approximately 3 to 4 days ago she developed severe back pain along with abdominal cramping that was progressively worsening over the past 24 hours prior to arrival.  She had self-limited  Nausea and 1 episode of  Vomiting. Over the last 24 hours she has had more frequent stools that are foul-smelling and dark in appearance.      Since arrival she has had T-max of 99.8 and has been hemodynamically stable.  Initial labs showed a neutrophilic leukocytosis (WBC 21.32), lactic acidosis (3.9), elevated creatinine 1.12.  UA showed no bacteria or pyuria.  Creatinine of 1.53  Blood cultures  on 8/20  are  no growth to date. CT of the abdomen and pelvis which showed a partially calcified 17 mm left renal  lesion that could represent a complex cyst, numerous colonic diverticuli, and inflammatory change in the left side of the pelvis could possibly represent focal colitis or diverticulitis C. difficile secondary to antibiotic usage.  C. difficile test was negative    She has a history of diverticulitis 2010 complicated by cdiff x 2 in 2010. At no time did her pain resemble her current symptoms. Exposures: she has horses, dogs, cats at home. She denies exposure to reptiles, birds or wild animals She has had no exposure to wild animals. Her SO has not been ill She ate take out food from a [employer] restaurant 8 days ago, 4 days prior to the onset of symptoms. She denies ingestion of raw eggs, unpasteurized dairy products, etc    She is currently receiving IV Zosyn, Flagyl andoral vancomycin.  ID has been consulted for further evaluation and treatment as well as antimicrobial therapy management.        Past Medical History:   Diagnosis Date   • Anxiety    • Diabetes (CMS/HCC)     diet and exercise controlled    • Diverticulitis    • History of right salpingo-oophorectomy 12/2018   • Hypertension    • Melanoma (CMS/HCC)    • PONV (postoperative nausea and vomiting)    • Positive TB test    • Wears glasses        Past Surgical History:   Procedure Laterality Date   • APPENDECTOMY     • CERVICAL CONIZATION     • COLONOSCOPY  2019 DEC    • ENDOSCOPY     • OOPHORECTOMY Right    • SKIN CANCER EXCISION      melanoma   • TONSILLECTOMY AND ADENOIDECTOMY     • TOTAL LAPAROSCOPIC HYSTERECTOMY SALPINGO OOPHORECTOMY Left 8/3/2020    Procedure: TOTAL LAPAROSCOPIC HYSTERECTOMY LEFT SALPINGOOPHORECTOMY WITH DAVINCI ROBOT;  Surgeon: Ne Marmolejo MD;  Location: [facility];  Service: [facility];  Laterality: Left;       Family History   Problem Relation Age of Onset   • Heart disease Mother    • Colon cancer Father    • Breast cancer Neg Hx    • Ovarian cancer Neg Hx        Social History     Socioeconomic History   • Marital status:       Spouse name: Not on file   • Number of children: 2   • Years of education: Not on file   • Highest education level: Not on file   Tobacco Use   • Smoking status: Current Some Day Smoker     Packs/day: 1.00     Years: 20.00     Pack years: 20.00     Types: Cigarettes     Last attempt to quit: 2006     Years since quittin.0   • Smokeless tobacco: Never Used   Substance and Sexual Activity   • Alcohol use: Never     Frequency: Never     Drinks per session: 1 or 2   • Drug use: Never   • Sexual activity: Defer       Allergies   Allergen Reactions   • Codeine Dizziness and Delirium     Feels drunk   • Percocet [Oxycodone-Acetaminophen] Itching         Medication:    Current Facility-Administered Medications:   •  acetaminophen (TYLENOL) tablet 650 mg, 650 mg, Oral, Q6H PRN, Elieser Medrano MD, 650 mg at 20 1828  •  ALPRAZolam (XANAX) tablet 0.25 mg, 0.25 mg, Oral, TID PRN, Noel, Anne-Marie, APRN  •  calcium carbonate EX (TUMS EX) chewable tablet 750 mg, 750 mg, Oral, TID PRN, Elieser Medrano MD, 750 mg at 20 1838  •  dextrose (D50W) 25 g/ 50mL Intravenous Solution 25 g, 25 g, Intravenous, Q15 Min PRN, Noel, Anne-Marie, APRN  •  dextrose (GLUTOSE) oral gel 15 g, 15 g, Oral, Q15 Min PRN, Noel, Anne-Marie, APRN  •  FLUoxetine (PROzac) capsule 20 mg, 20 mg, Oral, Daily, Noel, Anne-Marie, APRN, 20 mg at 20 1151  •  glucagon (human recombinant) (GLUCAGEN DIAGNOSTIC) injection 1 mg, 1 mg, Subcutaneous, Q15 Min PRN, Noel, Anne-Marie, APRN  •  insulin lispro (humaLOG) injection 0-7 Units, 0-7 Units, Subcutaneous, TID AC, Noel, Anne-Marie, APRN  •  Magnesium Sulfate 2 gram Bolus, followed by 8 gram infusion (total Mg dose 10 grams)- Mg less than or equal to 1mg/dL, 2 g, Intravenous, PRN **OR** Magnesium Sulfate 2 gram / 50mL Infusion (GIVE X 3 BAGS TO EQUAL 6GM TOTAL DOSE) - Mg 1.1 - 1.5 mg/dl, 2 g, Intravenous, PRN **OR** Magnesium Sulfate 4 gram infusion- Mg 1.6-1.9 mg/dL, 4 g,  Intravenous, PRN, Barbara Haile DO  •  metroNIDAZOLE (FLAGYL) 500 mg/100mL IVPB, 500 mg, Intravenous, Q8H, Barbara Haile DO, Last Rate: 0 mL/hr at 08/20/20 0800, 500 mg at 08/21/20 0640  •  morphine injection 1 mg, 1 mg, Intravenous, Q4H PRN **AND** naloxone (NARCAN) injection 0.4 mg, 0.4 mg, Intravenous, Q5 Min PRN, NoelTaran ferrella, APRN  •  Pharmacy Consult, , Does not apply, Continuous PRN, Elieser Medrano MD  •  piperacillin-tazobactam (ZOSYN) 3.375 g in iso-osmotic dextrose 50 ml (premix), 3.375 g, Intravenous, Q8H, NoelDouglas ferrellatha, APRN, 3.375 g at 08/21/20 0426  •  potassium chloride (MICRO-K) CR capsule 40 mEq, 40 mEq, Oral, PRN, 40 mEq at 08/20/20 1828 **OR** potassium chloride (KLOR-CON) packet 40 mEq, 40 mEq, Oral, PRN **OR** potassium chloride 10 mEq in 100 mL IVPB, 10 mEq, Intravenous, Q1H PRN, Barbara Haile DO  •  rosuvastatin (CRESTOR) tablet 40 mg, 40 mg, Oral, Daily, NoelTaran ferrella, APRN, 40 mg at 08/20/20 1151  •  sodium chloride 0.9 % flush 10 mL, 10 mL, Intravenous, PRN, Malgorzata Torres MD  •  sodium chloride 0.9 % infusion, 125 mL/hr, Intravenous, Continuous, Elieser Medrano MD  •  vancomycin oral solution reconstituted 250 mg, 250 mg, Oral, Q6H, Elieser Medrano MD, 250 mg at 08/21/20 0427    Antibiotics:  Anti-Infectives (From admission, onward)    Ordered     Dose/Rate Route Frequency Start Stop    08/20/20 1117  piperacillin-tazobactam (ZOSYN) 3.375 g in iso-osmotic dextrose 50 ml (premix)  Review   Ordering Provider:  Anne-Marie Cohen APRN    3.375 g  over 4 Hours Intravenous Every 8 Hours 08/20/20 1300 08/25/20 1259    08/20/20 0859  vancomycin oral solution reconstituted 250 mg  Review   Ordering Provider:  Elieser Medrano MD    250 mg Oral Every 6 Hours Scheduled 08/20/20 0859 09/03/20 0959    08/20/20 0531  metroNIDAZOLE (FLAGYL) 500 mg/100mL IVPB  Review   Ordering Provider:  Barbara Haile DO    500 mg  over 60 Minutes Intravenous Every 8 Hours 08/20/20 0600 08/25/20  0559    20 0342  piperacillin-tazobactam (ZOSYN) 3.375 g in iso-osmotic dextrose 50 ml (premix)     Ordering Provider:  Malgorzata Torres MD    3.375 g  over 30 Minutes Intravenous Once 20 0344 20 0550            Review of Systems:  Constitutional--positive for appetite change.  HEENT--negative.  CV--negative  Resp--negative  GI-positive for foul-smelling diarrhea, nausea, vomiting and abdominal pain.  --negative.  Lymph-negative.   Heme-negative.  MS--positive for back pain.  Neuro--negative.  Skin--positive for surgical incision and drainage      Physical Exam:   Vital Signs  Temp (24hrs), Av.5 °F (36.9 °C), Min:98.1 °F (36.7 °C), Max:98.8 °F (37.1 °C)    Temp  Min: 98.1 °F (36.7 °C)  Max: 98.8 °F (37.1 °C)  BP  Min: 124/81  Max: 164/88  Pulse  Min: 76  Max: 93  Resp  Min: 18  Max: 18  SpO2  Min: 92 %  Max: 98 %    GENERAL: Awake and alert, in no acute distress.   HEENT: Normocephalic, atraumatic.  PERRL. EOMI. No conjunctival injection. No icterus. Oropharynx clear without evidence of thrush or exudate. No evidence of peridontal disease.    NECK: Supple without nuchal rigidity. No mass.  LYMPH: No cervical, axillary lymphadenopathy.  HEART: S1/S2 noted with no audible murmur, rubs or gallops noted.   LUNGS: CTA bilaterally with no wheezing or rhonchi noted.  Nonlabored respirations.  ABDOMEN: Soft, nontender, nondistended.  Hyperactive bowel sounds x4 quadrants.  No rebound or guarding with no appreciable HSM noted.    EXT:  No cyanosis, clubbing or edema. No cord.  :   Without Khan catheter.  MSK: FROM.    SKIN: Warm and dry without rashes or lesions noted.  She has 4 surgical incisions to the abdomen which are well-healed with well approximated edges and only mild erythema.  NEURO: Oriented to PPT. No focal deficits on motor/sensory exam at arms/legs.  PSYCHIATRIC: Normal insight and judgement. Cooperative with PE    Laboratory Data    Results from last 7 days   Lab Units  08/21/20  0407 08/20/20  1242 08/20/20  0302   WBC 10*3/mm3 11.01* 12.35* 21.32*   HEMOGLOBIN g/dL 11.0* 12.1 12.9   HEMATOCRIT % 35.0 37.5 39.0   PLATELETS 10*3/mm3 268 271 326     Results from last 7 days   Lab Units 08/21/20  0407   SODIUM mmol/L 143   POTASSIUM mmol/L 4.8   CHLORIDE mmol/L 113*   CO2 mmol/L 22.0   BUN mg/dL 16   CREATININE mg/dL 1.53*   GLUCOSE mg/dL 127*   CALCIUM mg/dL 8.5*     Results from last 7 days   Lab Units 08/21/20  0407   ALK PHOS U/L 66   BILIRUBIN mg/dL 0.4   ALT (SGPT) U/L 16   AST (SGOT) U/L 23             Results from last 7 days   Lab Units 08/20/20  0845   LACTATE mmol/L 1.4             Estimated Creatinine Clearance: 42.5 mL/min (A) (by C-G formula based on SCr of 1.53 mg/dL (H)).      Microbiology:  Microbiology Results (last 10 days)     Procedure Component Value - Date/Time    Blood Culture - Blood, Arm, Left [[id number]] Collected:  08/20/20 0322    Lab Status:  Preliminary result Specimen:  Blood from Arm, Left Updated:  08/21/20 0345     Blood Culture No growth at 24 hours    Blood Culture - Blood, Arm, Left [[id number]] Collected:  08/20/20 0322    Lab Status:  Preliminary result Specimen:  Blood from Arm, Left Updated:  08/21/20 0345     Blood Culture No growth at 24 hours              Radiology:  Imaging Results (Last 72 Hours)     Procedure Component Value Units Date/Time    CT Abdomen Pelvis Without Contrast [[id number]] Collected:  08/20/20 0327     Updated:  08/20/20 0329    Narrative:       CT Abdomen Pelvis WO    INDICATION:   Abdominal pain all over for 3 days    TECHNIQUE:   CT of the abdomen and pelvis without IV contrast. Coronal and sagittal reconstructions were obtained.  Radiation dose reduction techniques included automated exposure control or exposure modulation based on body size. Count of known CT and cardiac nuc  med studies performed in previous 12 months: 0.     COMPARISON:   None available.    FINDINGS:  Abdomen: Lung bases are clear the liver,  gallbladder, spleen, pancreas, adrenal glands and right kidney normal.    The left kidney has an oval lesion in the midportion of the kidney. This is just anterior to the renal parenchyma. It measures about 17 mm maximum dimension and is partially calcified. This is likely a complex cyst. The aneurysm is less likely.    The aorta is normal in size. There is no adenopathy. The bowel shows numerous left colon diverticuli. There seems be some mild inflammatory stranding in the left side of the pelvis. I cannot clearly identify any site of diverticulitis there is no colon  wall thickening identified.    Pelvis: Bladder is normal. The uterus is been removed. There are no adnexal masses. Bones are unremarkable.      Impression:       Partially calcified 17 mm left renal lesion. This could represent a complex cyst, solid mass within the aneurysm. Follow-up as an outpatient with multiphase renal CT scan is recommended.    Numerous colonic diverticuli    This seems be some subtle inflammatory change in the left side of the pelvis. This is best appreciated on image 72. This could possibly represent focal colitis or diverticulitis.          Signer Name: Javier Lima MD   Signed: 8/20/2020 3:27 AM   Workstation Name: [email or web address]    [facility]            Impression:   1. Sepsis presenting with neutrophilic leukocytosis (21.32), lactic acidosis (3.9), worsening acute kidney injury, surgical incision drainage and diarrhea  2. Recent postoperative surgical wound infection status post laparoscopic hysterectomy and left oophorectomy on 8/3/2020-resolved with outpatient Keflex therapy.  3. C. difficile colitis  4. Neutrophilic leukocytosis-improving  5. Acute kidney injury-worsening  6. Lactic acidosis-improving  7. Diverticulosis  8. Diabetes mellitus type 2  9. Hypertension    PLAN/RECOMMENDATIONS:   Thank you for asking us to see Bhargavi Mohr, I recommend the following  -- stop  oral vancomycin and IV  Mac since C. difficile negative  -- Change IV Zosyn to Tygacil for diverticulosis/diverticulitis for now  -- GI panel, stool culture, fecal leukocytes  -- Continue to follow cultures and sensitivity reports and adjust antibiotics accordingly  -- Continue to follow labs while in hospital to include CBC, CMP, ESR, CRP, procalcitonin and lactic acid.  -- probiotic       Dr. Yvonne Caldwell has obtained the history, performed the physical exam and formulated the above treatment plan.     Ranjit Benson, APRN  8/21/2020  10:21

## 2020-08-22 LAB
ALBUMIN SERPL-MCNC: 3.3 G/DL (ref 3.5–5.2)
ALBUMIN/GLOB SERPL: 1.2 G/DL
ALP SERPL-CCNC: 62 U/L (ref 39–117)
ALT SERPL W P-5'-P-CCNC: 14 U/L (ref 1–33)
ANION GAP SERPL CALCULATED.3IONS-SCNC: 11 MMOL/L (ref 5–15)
AST SERPL-CCNC: 15 U/L (ref 1–32)
BASOPHILS # BLD AUTO: 0.05 10*3/MM3 (ref 0–0.2)
BASOPHILS NFR BLD AUTO: 0.4 % (ref 0–1.5)
BILIRUB SERPL-MCNC: 0.2 MG/DL (ref 0–1.2)
BUN SERPL-MCNC: 17 MG/DL (ref 6–20)
BUN/CREAT SERPL: 17 (ref 7–25)
CALCIUM SPEC-SCNC: 8.7 MG/DL (ref 8.6–10.5)
CHLORIDE SERPL-SCNC: 107 MMOL/L (ref 98–107)
CO2 SERPL-SCNC: 22 MMOL/L (ref 22–29)
CREAT SERPL-MCNC: 1 MG/DL (ref 0.57–1)
CRP SERPL-MCNC: 3.81 MG/DL (ref 0–0.5)
D-LACTATE SERPL-SCNC: 0.8 MMOL/L (ref 0.5–2)
DEPRECATED RDW RBC AUTO: 43.3 FL (ref 37–54)
EOSINOPHIL # BLD AUTO: 0.46 10*3/MM3 (ref 0–0.4)
EOSINOPHIL NFR BLD AUTO: 3.6 % (ref 0.3–6.2)
ERYTHROCYTE [DISTWIDTH] IN BLOOD BY AUTOMATED COUNT: 12.2 % (ref 12.3–15.4)
ERYTHROCYTE [SEDIMENTATION RATE] IN BLOOD: 30 MM/HR (ref 0–30)
GFR SERPL CREATININE-BSD FRML MDRD: 58 ML/MIN/1.73
GLOBULIN UR ELPH-MCNC: 2.8 GM/DL
GLUCOSE BLDC GLUCOMTR-MCNC: 101 MG/DL (ref 70–130)
GLUCOSE BLDC GLUCOMTR-MCNC: 108 MG/DL (ref 70–130)
GLUCOSE BLDC GLUCOMTR-MCNC: 119 MG/DL (ref 70–130)
GLUCOSE BLDC GLUCOMTR-MCNC: 177 MG/DL (ref 70–130)
GLUCOSE SERPL-MCNC: 115 MG/DL (ref 65–99)
HCT VFR BLD AUTO: 34.6 % (ref 34–46.6)
HGB BLD-MCNC: 11.1 G/DL (ref 12–15.9)
IMM GRANULOCYTES # BLD AUTO: 0.05 10*3/MM3 (ref 0–0.05)
IMM GRANULOCYTES NFR BLD AUTO: 0.4 % (ref 0–0.5)
LYMPHOCYTES # BLD AUTO: 1.86 10*3/MM3 (ref 0.7–3.1)
LYMPHOCYTES NFR BLD AUTO: 14.7 % (ref 19.6–45.3)
MCH RBC QN AUTO: 30.4 PG (ref 26.6–33)
MCHC RBC AUTO-ENTMCNC: 32.1 G/DL (ref 31.5–35.7)
MCV RBC AUTO: 94.8 FL (ref 79–97)
MONOCYTES # BLD AUTO: 0.73 10*3/MM3 (ref 0.1–0.9)
MONOCYTES NFR BLD AUTO: 5.8 % (ref 5–12)
NEUTROPHILS NFR BLD AUTO: 75.1 % (ref 42.7–76)
NEUTROPHILS NFR BLD AUTO: 9.53 10*3/MM3 (ref 1.7–7)
NRBC BLD AUTO-RTO: 0 /100 WBC (ref 0–0.2)
PLATELET # BLD AUTO: 267 10*3/MM3 (ref 140–450)
PMV BLD AUTO: 10.5 FL (ref 6–12)
POTASSIUM SERPL-SCNC: 4.3 MMOL/L (ref 3.5–5.2)
PROCALCITONIN SERPL-MCNC: 0.05 NG/ML (ref 0–0.25)
PROT SERPL-MCNC: 6.1 G/DL (ref 6–8.5)
RBC # BLD AUTO: 3.65 10*6/MM3 (ref 3.77–5.28)
SODIUM SERPL-SCNC: 140 MMOL/L (ref 136–145)
TROPONIN T SERPL-MCNC: <0.01 NG/ML (ref 0–0.03)
WBC # BLD AUTO: 12.68 10*3/MM3 (ref 3.4–10.8)

## 2020-08-22 PROCEDURE — 82962 GLUCOSE BLOOD TEST: CPT

## 2020-08-22 PROCEDURE — 85652 RBC SED RATE AUTOMATED: CPT | Performed by: NURSE PRACTITIONER

## 2020-08-22 PROCEDURE — 85025 COMPLETE CBC W/AUTO DIFF WBC: CPT | Performed by: NURSE PRACTITIONER

## 2020-08-22 PROCEDURE — 83605 ASSAY OF LACTIC ACID: CPT | Performed by: NURSE PRACTITIONER

## 2020-08-22 PROCEDURE — 25010000002 ONDANSETRON PER 1 MG: Performed by: INTERNAL MEDICINE

## 2020-08-22 PROCEDURE — 63710000001 INSULIN LISPRO (HUMAN) PER 5 UNITS: Performed by: NURSE PRACTITIONER

## 2020-08-22 PROCEDURE — 84145 PROCALCITONIN (PCT): CPT | Performed by: NURSE PRACTITIONER

## 2020-08-22 PROCEDURE — 84484 ASSAY OF TROPONIN QUANT: CPT | Performed by: INTERNAL MEDICINE

## 2020-08-22 PROCEDURE — 99233 SBSQ HOSP IP/OBS HIGH 50: CPT | Performed by: INTERNAL MEDICINE

## 2020-08-22 PROCEDURE — 80053 COMPREHEN METABOLIC PANEL: CPT | Performed by: NURSE PRACTITIONER

## 2020-08-22 PROCEDURE — 86140 C-REACTIVE PROTEIN: CPT | Performed by: NURSE PRACTITIONER

## 2020-08-22 PROCEDURE — 25010000002 TIGECYCLINE PER 1 MG: Performed by: INTERNAL MEDICINE

## 2020-08-22 RX ORDER — ONDANSETRON 2 MG/ML
4 INJECTION INTRAMUSCULAR; INTRAVENOUS EVERY 6 HOURS PRN
Status: DISCONTINUED | OUTPATIENT
Start: 2020-08-22 | End: 2020-08-25 | Stop reason: HOSPADM

## 2020-08-22 RX ADMIN — SODIUM CHLORIDE, PRESERVATIVE FREE 10 ML: 5 INJECTION INTRAVENOUS at 08:39

## 2020-08-22 RX ADMIN — SODIUM CHLORIDE 125 ML/HR: 9 INJECTION, SOLUTION INTRAVENOUS at 16:02

## 2020-08-22 RX ADMIN — SODIUM CHLORIDE 125 ML/HR: 9 INJECTION, SOLUTION INTRAVENOUS at 23:58

## 2020-08-22 RX ADMIN — Medication 1 CAPSULE: at 08:39

## 2020-08-22 RX ADMIN — ACETAMINOPHEN 650 MG: 325 TABLET, FILM COATED ORAL at 22:05

## 2020-08-22 RX ADMIN — ONDANSETRON 4 MG: 2 INJECTION INTRAMUSCULAR; INTRAVENOUS at 08:38

## 2020-08-22 RX ADMIN — ROSUVASTATIN CALCIUM 40 MG: 20 TABLET, COATED ORAL at 08:38

## 2020-08-22 RX ADMIN — SODIUM CHLORIDE 50 MG: 900 INJECTION INTRAVENOUS at 08:38

## 2020-08-22 RX ADMIN — FLUOXETINE HYDROCHLORIDE 20 MG: 20 CAPSULE ORAL at 08:39

## 2020-08-22 RX ADMIN — SODIUM CHLORIDE 125 ML/HR: 9 INJECTION, SOLUTION INTRAVENOUS at 03:23

## 2020-08-22 RX ADMIN — INSULIN LISPRO 2 UNITS: 100 INJECTION, SOLUTION INTRAVENOUS; SUBCUTANEOUS at 18:04

## 2020-08-22 RX ADMIN — SODIUM CHLORIDE 50 MG: 900 INJECTION INTRAVENOUS at 22:04

## 2020-08-22 NOTE — PLAN OF CARE
Problem: Patient Care Overview  Goal: Plan of Care Review  Outcome: Ongoing (interventions implemented as appropriate)  Flowsheets  Taken 8/22/2020 4021  Progress: improving  Outcome Summary: Pt A/O x4. VSS. NSR on the monitor. RA. Pt had some complaints of lower back pain, PRN tylenol was administered. Will continue to monitor.  Taken 8/21/2020 2030  Plan of Care Reviewed With: patient

## 2020-08-22 NOTE — PROGRESS NOTES
"    [facility] Medicine Services  PROGRESS NOTE    Patient Name: Bhargavi Mohr  : 1965  MRN: [id number]    Date of Admission: 2020  Primary Care Physician: Charu Jordan MD    Subjective   Subjective     CC:  Abdominal pain and diarrhea    HPI:  Still having watery stool, 5 bms since midnight. Abdominal pain has largely resolved, but patient says she still has some discomfort \"like I am having a lot of diarrhea.\"  Emesis x 1 this morning, new.    Review of Systems  Gen- No fevers, chills  CV- No chest pain, palpitations  Resp- No cough, dyspnea  GI- + emesis, + loose stool as above.        Objective   Objective     Vital Signs:   Temp:  [97.8 °F (36.6 °C)-98.7 °F (37.1 °C)] 98.7 °F (37.1 °C)  Heart Rate:  [66-89] 78  Resp:  [16-18] 16  BP: (153-164)/(76-96) 162/84        Physical Exam:  Constitutional -no acute distress, non toxic, in bed  HEENT-NCAT, mucous membranes moist  CV-RRR, S1 S2 normal, no m/r/g  Resp-CTAB, no wheezes, rhonchi or rales  Abd-soft, non-tender, non-distended, normo active bowel sounds  Ext-No lower extremity cyanosis, clubbing or edema bilaterally  Neuro-alert and oriented, speech clear, moves all extremities   Psych-normal affect   Skin- No rash on exposed UE or LE bilaterally    Results Reviewed:  Results from last 7 days   Lab Units 20  0649 20  0407 20  1242   WBC 10*3/mm3 12.68* 11.01* 12.35*   HEMOGLOBIN g/dL 11.1* 11.0* 12.1   HEMATOCRIT % 34.6 35.0 37.5   PLATELETS 10*3/mm3 267 268 271   PROCALCITONIN ng/mL 0.05  --   --      Results from last 7 days   Lab Units 20  0649 20  0029 20  1753 20  0407 20  0019  20  0302   SODIUM mmol/L 140  --   --  143 144   < > 137   POTASSIUM mmol/L 4.3  --   --  4.8 4.5   < > 3.1*   CHLORIDE mmol/L 107  --   --  113* 113*   < > 101   CO2 mmol/L 22.0  --   --  22.0 23.0   < > 19.0*   BUN mg/dL 17  --   --  16 16   < > 21*   CREATININE mg/dL 1.00  --   --  1.53* " 1.64*   < > 1.12*   GLUCOSE mg/dL 115*  --   --  127* 118*   < > 239*   CALCIUM mg/dL 8.7  --   --  8.5* 8.7   < > 9.6   ALT (SGPT) U/L 14  --   --  16  --   --  23   AST (SGOT) U/L 15  --   --  23  --   --  20   TROPONIN T ng/mL  --  <0.010 <0.010  --   --   --   --     < > = values in this interval not displayed.     Estimated Creatinine Clearance: 65.9 mL/min (by C-G formula based on SCr of 1 mg/dL).    Microbiology Results Abnormal     Procedure Component Value - Date/Time    Blood Culture - Blood, Arm, Left [[id number]] Collected:  08/20/20 0322    Lab Status:  Preliminary result Specimen:  Blood from Arm, Left Updated:  08/22/20 0345     Blood Culture No growth at 2 days    Blood Culture - Blood, Arm, Left [[id number]] Collected:  08/20/20 0322    Lab Status:  Preliminary result Specimen:  Blood from Arm, Left Updated:  08/22/20 0345     Blood Culture No growth at 2 days    Gastrointestinal Panel, PCR - Stool, Per Rectum [[id number]]  (Normal) Collected:  08/21/20 1045    Lab Status:  Final result Specimen:  Stool from Per Rectum Updated:  08/21/20 1426     Campylobacter Not Detected     Plesiomonas shigelloides Not Detected     Salmonella Not Detected     Vibrio Not Detected     Vibrio cholerae Not Detected     Yersinia enterocolitica Not Detected     Enteroaggregative E. coli (EAEC) Not Detected     Enteropathogenic E. coli (EPEC) Not Detected     Enterotoxigenic E. coli (ETEC) lt/st Not Detected     Shiga-like toxin-producing E. coli (STEC) stx1/stx2 Not Detected     E. coli O157 Not Detected     Shigella/Enteroinvasive E. coli (EIEC) Not Detected     Cryptosporidium Not Detected     Cyclospora cayetanensis Not Detected     Entamoeba histolytica Not Detected     Giardia lamblia Not Detected     Adenovirus F40/41 Not Detected     Astrovirus Not Detected     Norovirus GI/GII Not Detected     Rotavirus A Not Detected     Sapovirus (I, II, IV or V) Not Detected    Fecal Leukocytes - Stool, Per Rectum [[id number]]   (Normal) Collected:  08/21/20 1045    Lab Status:  Final result Specimen:  Stool from Per Rectum Updated:  08/21/20 1416     Fecal Leukocytes No WBC's Seen    Clostridium Difficile Toxin - Stool, Per Rectum [[id number]] Collected:  08/21/20 1045    Lab Status:  Final result Specimen:  Stool from Per Rectum Updated:  08/21/20 1154    Narrative:       The following orders were created for panel order Clostridium Difficile Toxin - Stool, Per Rectum.  Procedure                               Abnormality         Status                     ---------                               -----------         ------                     Clostridium Difficile To...[[id number]]  Normal              Final result                 Please view results for these tests on the individual orders.    Clostridium Difficile Toxin, PCR - Stool, Per Rectum [[id number]]  (Normal) Collected:  08/21/20 1045    Lab Status:  Final result Specimen:  Stool from Per Rectum Updated:  08/21/20 1154     C. Difficile Toxins by PCR Not Detected    Narrative:       Performance characteristics of test not established for patients <2 years of age.  Negative for Toxigenic C. Difficile          Imaging Results (Last 24 Hours)     ** No results found for the last 24 hours. **               I have reviewed the medications:  Scheduled Meds:    FLUoxetine 20 mg Oral Daily   insulin lispro 0-7 Units Subcutaneous TID AC   lactobacillus acidophilus 1 capsule Oral Daily   rosuvastatin 40 mg Oral Daily   tigecycline 50 mg Intravenous Q12H     Continuous Infusions:    Pharmacy Consult     sodium chloride 125 mL/hr Last Rate: 125 mL/hr (08/22/20 0323)     PRN Meds:.•  acetaminophen  •  ALPRAZolam  •  calcium carbonate EX  •  dextrose  •  dextrose  •  glucagon (human recombinant)  •  labetalol  •  magnesium sulfate **OR** magnesium sulfate **OR** magnesium sulfate  •  Morphine **AND** naloxone  •  ondansetron  •  Pharmacy Consult  •  potassium chloride **OR** potassium chloride  **OR** potassium chloride  •  sodium chloride    Assessment/Plan   Assessment & Plan     Active Hospital Problems    Diagnosis  POA   • **Sepsis without acute organ dysfunction (CMS/HCC) [A41.9]  Yes   • Type 2 diabetes mellitus (CMS/HCC) [E11.9]  Yes   • History of melanoma [Z85.820]  Not Applicable   • Essential hypertension [I10]  Yes   • Diverticulosis [K57.90]  Yes   • Hypokalemia [E87.6]  Unknown   • HERO (acute kidney injury) (CMS/HCC) [N17.9]  Unknown      Resolved Hospital Problems   No resolved problems to display.        Brief Hospital Course to date:  Bhargavi Mohr is a 54 y.o. female with history of anxiety, DL, HTN, diverticulosis, DMII, prior C diff colitis and melanoma, with recent laparoscopic hysterectomy and slapingoophorectomy (8/3/20 Dr Marmolejo), who just finished a course of keflex for redness and drainage at one of her laparoscopic incision sites, presents with chills, abdominal pain and loose dark stool.    Sepsis  - leukocytosis (WBC 21), lactic acidosis (lactate 3.9) and HERO, all secondary to Colitis    Colitis  - C diff PCR and GI panel negative  - loose stool continues but abdominal pain and leukocytosis have improved. WBC today 12, repeat CBC am.  - emesis x 1 this am, new finding, suspect may be secondary to new antibiotic (Tygacil)  - procalcitonin low (0.05), modestly elevated CRP (3.81) today  - repeat cbc    Acute Kidney injury  - suspect secondary to sepsis and volume depletion from diarrhea, in setting of home NSAID, ACEi and diuretic use.  - prehospitalization creatinine 0.73, with peak creatinine 1.53 yesterday. Improved to 1.00 today with IV fluid hydration and continued holding of home lisinopril and hctz.  - continue IV fluids, encouraged oral fluid intake, repeat bmp am    Renal lesion  - discussed with patient, will need dedicated renal scan (with contrast as possible) as an outpatient. Follow up with PCP next week.     Chest tightness  - patient says she felt some  "epigastric pain and a sensation of a \"band\" around her lower ribs yesterday evening. EKG personally reviewed for yesterday, no ST changes to my eye. Troponin x 2 negative. Patient says symptoms much better today, improved with tylenol and changing position in bed.    Lactic acidosis  - resolved    DMII  - a1c 6.5    HTN  - HCTZ and lisinopril held  - add prn labetolol    Anxiety    DVT Prophylaxis:  Mechanical, ambulate      Disposition: I expect the patient to be discharged home in 1-2 days.      CODE STATUS:   Code Status and Medical Interventions:   Ordered at: 08/20/20 0601     Level Of Support Discussed With:    Patient     Code Status:    CPR     Medical Interventions (Level of Support Prior to Arrest):    Full         Electronically signed by Elieser Medrano MD, 08/22/20, 08:58.        "

## 2020-08-22 NOTE — PROGRESS NOTES
[facility] Progress Note    Admission Date: 8/20/2020    Bhargavi Mohr  1965  [id number]    Date: 8/22/2020    Antibiotics:  Anti-Infectives (From admission, onward)    Ordered     Dose/Rate Route Frequency Start Stop    08/21/20 1241  tigecycline (TYGACIL) 50 mg/100 mL 0.9% NS IVPB (mbp)  Review   Ordering Provider:  Yvonne Caldwell MD    50 mg  over 30 Minutes Intravenous Every 12 Hours Scheduled 08/21/20 1400 08/28/20 0859    08/20/20 0342  piperacillin-tazobactam (ZOSYN) 3.375 g in iso-osmotic dextrose 50 ml (premix)     Ordering Provider:  Malgorzata Torres MD    3.375 g  over 30 Minutes Intravenous Once 08/20/20 0344 08/20/20 0550             Chief complaint: Abdominal pain     History of present illness:    Ms. Bhargavi Mohr is a 54 y.o. female who is being evaluated for sepsis.  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  She presented to the ED on 8/20 with complaints of abdominal pain.  Patient reportedly underwent a laparoscopic hysterectomy with left oophorectomy secondary to dysplasia of the cervix and high-grade EDMUND-2 on 8/3/2020.  Approximately 1 week ago she noted that 1 of the incision sites began draining and she presented to the [facility] he was prescribed Keflex.  She followed up with Dr. Guevara's office who removed the stitch and then packed the incision with gauze.  Approximately 4 days later she had another site that started to drain.  Patient states she removed the glue herself and allow the site to drain and stated that the site began to improve.  Approximately 3 to 4 days ago she developed severe back pain along with abdominal cramping that was progressively worsening over the past 24 hours prior to arrival.  She had self-limited  Nausea and 1 episode of  Vomiting. Over the last 24 hours she has had more frequent stools that are foul-smelling and  dark in appearance.       Since arrival she has had T-max of 99.8 and has been hemodynamically stable.  Initial labs showed a neutrophilic leukocytosis (WBC 21.32), lactic acidosis (3.9), elevated creatinine 1.12.  UA showed no bacteria or pyuria.  Creatinine of 1.53  Blood cultures  on 8/20  are  no growth to date. CT of the abdomen and pelvis which showed a partially calcified 17 mm left renal lesion that could represent a complex cyst, numerous colonic diverticuli, and inflammatory change in the left side of the pelvis could possibly represent focal colitis or diverticulitis C. difficile secondary to antibiotic usage.  C. difficile test was negative     She has a history of diverticulitis 2010 complicated by cdiff x 2 in 2010. At no time did her pain resemble her current symptoms. Exposures: she has horses, dogs, cats at home. She denies exposure to reptiles, birds or wild animals She has had no exposure to wild animals. Her SO has not been ill She ate take out food from a [email or web address] restaurant 8 days ago, 4 days prior to the onset of symptoms. She denies ingestion of raw eggs, unpasteurized dairy products, etc     She is currently receiving IV Zosyn, Flagyl andoral vancomycin.  ID has been consulted for further evaluation and treatment as well as antimicrobial therapy management.    8/22  Changed to tygacil yesterday  Afebrile, abdominal cramping improved but liquid stools persist - 5 last pm  She vomited once this am per Dr Medrano around the time of her tygacil dose  Fecal leukocytes and GI panel negative          Review of Systems:  Constitutional--positive for appetite change.  HEENT--negative.  CV--negative  Resp--negative  GI-positive for foul-smelling diarrhea, nausea, vomiting and abdominal pain.  --negative.  Lymph-negative.   Heme-negative.  MS--positive for back pain.  Neuro--negative.  Skin--positive for surgical incision and drainage        PE:  Vital Signs  Temp  Min: 97.8 °F (36.6 °C)  Max: 98.7 °F  (37.1 °C)  BP  Min: 153/84  Max: 162/84  Pulse  Min: 66  Max: 89  Resp  Min: 16  Max: 18  SpO2  Min: 95 %  Max: 98 %  GENERAL: Awake and alert, in no acute distress.   HEENT: Normocephalic, atraumatic.  PERRL. EOMI. No conjunctival injection. No icterus. Oropharynx clear without evidence of thrush or exudate. No evidence of peridontal disease.    NECK: Supple without nuchal rigidity. No mass.  LYMPH: No cervical, axillary lymphadenopathy.  HEART: S1/S2 noted with no audible murmur, rubs or gallops noted.   LUNGS: CTA bilaterally with no wheezing or rhonchi noted.  Nonlabored respirations.  ABDOMEN: Soft, nontender, nondistended.  Hyperactive bowel sounds x4 quadrants.  No rebound or guarding with no appreciable HSM noted.    EXT:  No cyanosis, clubbing or edema. No cord.  :   Without Khan catheter.  MSK: FROM.    SKIN: Warm and dry without rashes or lesions noted.  She has 4 surgical incisions to the abdomen which are well-healed with well approximated edges and only mild erythema.  NEURO: Oriented to PPT. No focal deficits on motor/sensory exam at arms/legs.  PSYCHIATRIC: Normal insight and judgement. Cooperative with PE     Laboratory Data    Results from last 7 days   Lab Units 08/22/20  0649 08/21/20  0407 08/20/20  1242   WBC 10*3/mm3 12.68* 11.01* 12.35*   HEMOGLOBIN g/dL 11.1* 11.0* 12.1   HEMATOCRIT % 34.6 35.0 37.5   PLATELETS 10*3/mm3 267 268 271     Results from last 7 days   Lab Units 08/22/20  0649   SODIUM mmol/L 140   POTASSIUM mmol/L 4.3   CHLORIDE mmol/L 107   CO2 mmol/L 22.0   BUN mg/dL 17   CREATININE mg/dL 1.00   GLUCOSE mg/dL 115*   CALCIUM mg/dL 8.7     Results from last 7 days   Lab Units 08/22/20  0649   ALK PHOS U/L 62   BILIRUBIN mg/dL 0.2   ALT (SGPT) U/L 14   AST (SGOT) U/L 15     Results from last 7 days   Lab Units 08/22/20  0649   SED RATE mm/hr 30     Results from last 7 days   Lab Units 08/22/20  0649   CRP mg/dL 3.81*       Estimated Creatinine Clearance: 65.9 mL/min (by C-G  formula based on SCr of 1 mg/dL).      Microbiology:  pending    Radiology:  Imaging Results (Last 72 Hours)     Procedure Component Value Units Date/Time    CT Abdomen Pelvis Without Contrast [[id number]] Collected:  08/20/20 0327     Updated:  08/20/20 0329    Narrative:       CT Abdomen Pelvis WO    INDICATION:   Abdominal pain all over for 3 days    TECHNIQUE:   CT of the abdomen and pelvis without IV contrast. Coronal and sagittal reconstructions were obtained.  Radiation dose reduction techniques included automated exposure control or exposure modulation based on body size. Count of known CT and cardiac nuc  med studies performed in previous 12 months: 0.     COMPARISON:   None available.    FINDINGS:  Abdomen: Lung bases are clear the liver, gallbladder, spleen, pancreas, adrenal glands and right kidney normal.    The left kidney has an oval lesion in the midportion of the kidney. This is just anterior to the renal parenchyma. It measures about 17 mm maximum dimension and is partially calcified. This is likely a complex cyst. The aneurysm is less likely.    The aorta is normal in size. There is no adenopathy. The bowel shows numerous left colon diverticuli. There seems be some mild inflammatory stranding in the left side of the pelvis. I cannot clearly identify any site of diverticulitis there is no colon  wall thickening identified.    Pelvis: Bladder is normal. The uterus is been removed. There are no adnexal masses. Bones are unremarkable.      Impression:       Partially calcified 17 mm left renal lesion. This could represent a complex cyst, solid mass within the aneurysm. Follow-up as an outpatient with multiphase renal CT scan is recommended.    Numerous colonic diverticuli    This seems be some subtle inflammatory change in the left side of the pelvis. This is best appreciated on image 72. This could possibly represent focal colitis or diverticulitis.          Signer Name: Javier Lima MD   Signed:  8/20/2020 3:27 AM   Workstation Name: [email or web address]    [facility]          I personally reviewed the radiographic studies     Impression:   1. Sepsis presenting with neutrophilic leukocytosis (21.32), lactic acidosis (3.9), worsening acute kidney injury, surgical incision drainage and diarrhea  2. Recent postoperative surgical wound infection status post laparoscopic hysterectomy and left oophorectomy on 8/3/2020-resolved with outpatient Keflex therapy.  3. C. difficile colitis  4. Neutrophilic leukocytosis-improving  5. Acute kidney injury-worsening  6. Lactic acidosis-improving  7. Diverticulosis  8. Diabetes mellitus type 2  9. Hypertension     PLAN/RECOMMENDATIONS:   Thank you for asking us to see Bhargavi Mohr, I recommend the following  -- stop  oral vancomycin and IV Flagyl since C. difficile negative  -- Change IV Zosyn to Tygacil for diverticulosis/diverticulitis for now  -- GI panel, stool culture, fecal leukocytes  -- Continue to follow cultures and sensitivity reports and adjust antibiotics accordingly  -- Continue to follow labs while in hospital to include CBC, CMP, ESR, CRP, procalcitonin and lactic acid.  -- probiotic    Yvonne Caldwell MD  8/22/2020

## 2020-08-23 LAB
ALBUMIN SERPL-MCNC: 3.3 G/DL (ref 3.5–5.2)
ALBUMIN/GLOB SERPL: 1.7 G/DL
ALP SERPL-CCNC: 63 U/L (ref 39–117)
ALT SERPL W P-5'-P-CCNC: 14 U/L (ref 1–33)
ANION GAP SERPL CALCULATED.3IONS-SCNC: 11 MMOL/L (ref 5–15)
AST SERPL-CCNC: 14 U/L (ref 1–32)
BASOPHILS # BLD AUTO: 0.06 10*3/MM3 (ref 0–0.2)
BASOPHILS NFR BLD AUTO: 0.6 % (ref 0–1.5)
BILIRUB SERPL-MCNC: <0.2 MG/DL (ref 0–1.2)
BUN SERPL-MCNC: 19 MG/DL (ref 6–20)
BUN/CREAT SERPL: 24.4 (ref 7–25)
CALCIUM SPEC-SCNC: 8.3 MG/DL (ref 8.6–10.5)
CHLORIDE SERPL-SCNC: 108 MMOL/L (ref 98–107)
CO2 SERPL-SCNC: 21 MMOL/L (ref 22–29)
CREAT SERPL-MCNC: 0.78 MG/DL (ref 0.57–1)
DEPRECATED RDW RBC AUTO: 41.9 FL (ref 37–54)
EOSINOPHIL # BLD AUTO: 0.71 10*3/MM3 (ref 0–0.4)
EOSINOPHIL NFR BLD AUTO: 7 % (ref 0.3–6.2)
ERYTHROCYTE [DISTWIDTH] IN BLOOD BY AUTOMATED COUNT: 12.2 % (ref 12.3–15.4)
GFR SERPL CREATININE-BSD FRML MDRD: 77 ML/MIN/1.73
GLOBULIN UR ELPH-MCNC: 2 GM/DL
GLUCOSE BLDC GLUCOMTR-MCNC: 100 MG/DL (ref 70–130)
GLUCOSE BLDC GLUCOMTR-MCNC: 117 MG/DL (ref 70–130)
GLUCOSE BLDC GLUCOMTR-MCNC: 156 MG/DL (ref 70–130)
GLUCOSE BLDC GLUCOMTR-MCNC: 202 MG/DL (ref 70–130)
GLUCOSE BLDC GLUCOMTR-MCNC: 81 MG/DL (ref 70–130)
GLUCOSE SERPL-MCNC: 93 MG/DL (ref 65–99)
HCT VFR BLD AUTO: 32.9 % (ref 34–46.6)
HGB BLD-MCNC: 10.7 G/DL (ref 12–15.9)
IMM GRANULOCYTES # BLD AUTO: 0.07 10*3/MM3 (ref 0–0.05)
IMM GRANULOCYTES NFR BLD AUTO: 0.7 % (ref 0–0.5)
LYMPHOCYTES # BLD AUTO: 2.06 10*3/MM3 (ref 0.7–3.1)
LYMPHOCYTES NFR BLD AUTO: 20.4 % (ref 19.6–45.3)
MCH RBC QN AUTO: 30.4 PG (ref 26.6–33)
MCHC RBC AUTO-ENTMCNC: 32.5 G/DL (ref 31.5–35.7)
MCV RBC AUTO: 93.5 FL (ref 79–97)
MONOCYTES # BLD AUTO: 0.68 10*3/MM3 (ref 0.1–0.9)
MONOCYTES NFR BLD AUTO: 6.7 % (ref 5–12)
NEUTROPHILS NFR BLD AUTO: 6.53 10*3/MM3 (ref 1.7–7)
NEUTROPHILS NFR BLD AUTO: 64.6 % (ref 42.7–76)
NRBC BLD AUTO-RTO: 0 /100 WBC (ref 0–0.2)
PLATELET # BLD AUTO: 284 10*3/MM3 (ref 140–450)
PMV BLD AUTO: 10.8 FL (ref 6–12)
POTASSIUM SERPL-SCNC: 3.9 MMOL/L (ref 3.5–5.2)
PROT SERPL-MCNC: 5.3 G/DL (ref 6–8.5)
RBC # BLD AUTO: 3.52 10*6/MM3 (ref 3.77–5.28)
SODIUM SERPL-SCNC: 140 MMOL/L (ref 136–145)
WBC # BLD AUTO: 10.11 10*3/MM3 (ref 3.4–10.8)

## 2020-08-23 PROCEDURE — 82962 GLUCOSE BLOOD TEST: CPT

## 2020-08-23 PROCEDURE — 25010000002 ONDANSETRON PER 1 MG: Performed by: INTERNAL MEDICINE

## 2020-08-23 PROCEDURE — 63710000001 INSULIN LISPRO (HUMAN) PER 5 UNITS: Performed by: NURSE PRACTITIONER

## 2020-08-23 PROCEDURE — 25010000002 ENOXAPARIN PER 10 MG: Performed by: INTERNAL MEDICINE

## 2020-08-23 PROCEDURE — 99232 SBSQ HOSP IP/OBS MODERATE 35: CPT | Performed by: INTERNAL MEDICINE

## 2020-08-23 PROCEDURE — 85025 COMPLETE CBC W/AUTO DIFF WBC: CPT | Performed by: INTERNAL MEDICINE

## 2020-08-23 PROCEDURE — 80053 COMPREHEN METABOLIC PANEL: CPT | Performed by: INTERNAL MEDICINE

## 2020-08-23 PROCEDURE — 25010000002 TIGECYCLINE PER 1 MG: Performed by: INTERNAL MEDICINE

## 2020-08-23 PROCEDURE — 25010000002 MORPHINE PER 10 MG: Performed by: NURSE PRACTITIONER

## 2020-08-23 RX ORDER — DIAPER,BRIEF,INFANT-TODD,DISP
EACH MISCELLANEOUS EVERY 12 HOURS SCHEDULED
Status: DISCONTINUED | OUTPATIENT
Start: 2020-08-23 | End: 2020-08-25 | Stop reason: HOSPADM

## 2020-08-23 RX ORDER — AMLODIPINE BESYLATE 5 MG/1
5 TABLET ORAL
Status: DISCONTINUED | OUTPATIENT
Start: 2020-08-23 | End: 2020-08-25 | Stop reason: HOSPADM

## 2020-08-23 RX ADMIN — CALCIUM CARBONATE 750 MG: 750 TABLET ORAL at 16:46

## 2020-08-23 RX ADMIN — INSULIN LISPRO 2 UNITS: 100 INJECTION, SOLUTION INTRAVENOUS; SUBCUTANEOUS at 18:01

## 2020-08-23 RX ADMIN — SODIUM CHLORIDE, PRESERVATIVE FREE 10 ML: 5 INJECTION INTRAVENOUS at 20:59

## 2020-08-23 RX ADMIN — CALCIUM CARBONATE 750 MG: 750 TABLET ORAL at 09:03

## 2020-08-23 RX ADMIN — SODIUM CHLORIDE 50 MG: 900 INJECTION INTRAVENOUS at 09:03

## 2020-08-23 RX ADMIN — ROSUVASTATIN CALCIUM 40 MG: 20 TABLET, COATED ORAL at 09:03

## 2020-08-23 RX ADMIN — Medication 1 CAPSULE: at 09:03

## 2020-08-23 RX ADMIN — SODIUM CHLORIDE, PRESERVATIVE FREE 10 ML: 5 INJECTION INTRAVENOUS at 09:03

## 2020-08-23 RX ADMIN — ENOXAPARIN SODIUM 40 MG: 40 INJECTION SUBCUTANEOUS at 20:58

## 2020-08-23 RX ADMIN — MORPHINE SULFATE 1 MG: 2 INJECTION, SOLUTION INTRAMUSCULAR; INTRAVENOUS at 20:58

## 2020-08-23 RX ADMIN — AMLODIPINE BESYLATE 5 MG: 5 TABLET ORAL at 10:23

## 2020-08-23 RX ADMIN — ONDANSETRON 4 MG: 2 INJECTION INTRAMUSCULAR; INTRAVENOUS at 20:58

## 2020-08-23 RX ADMIN — ONDANSETRON 4 MG: 2 INJECTION INTRAMUSCULAR; INTRAVENOUS at 12:32

## 2020-08-23 RX ADMIN — HYDROCORTISONE: 1 CREAM TOPICAL at 22:20

## 2020-08-23 RX ADMIN — FLUOXETINE HYDROCHLORIDE 20 MG: 20 CAPSULE ORAL at 09:03

## 2020-08-23 RX ADMIN — SODIUM CHLORIDE 50 ML/HR: 9 INJECTION, SOLUTION INTRAVENOUS at 12:32

## 2020-08-23 RX ADMIN — ACETAMINOPHEN 650 MG: 325 TABLET, FILM COATED ORAL at 09:03

## 2020-08-23 NOTE — PROGRESS NOTES
[facility] Progress Note    Admission Date: 8/20/2020    Bhargavi Mohr  1965  [id number]    Date: 8/23/2020    Antibiotics:  Anti-Infectives (From admission, onward)    Ordered     Dose/Rate Route Frequency Start Stop    08/20/20 0342  piperacillin-tazobactam (ZOSYN) 3.375 g in iso-osmotic dextrose 50 ml (premix)     Ordering Provider:  Malgorzata Torres MD    3.375 g  over 30 Minutes Intravenous Once 08/20/20 0344 08/20/20 0550             Chief complaint: Abdominal pain     History of present illness:    Ms. Bhargavi Mohr is a 54 y.o. female who is being evaluated for sepsis.  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  She presented to the ED on 8/20 with complaints of abdominal pain.  Patient reportedly underwent a laparoscopic hysterectomy with left oophorectomy secondary to dysplasia of the cervix and high-grade EDMUND-2 on 8/3/2020.  Approximately 1 week ago she noted that 1 of the incision sites began draining and she presented to the [facility] he was prescribed Keflex.  She followed up with Dr. Guevara's office who removed the stitch and then packed the incision with gauze.  Approximately 4 days later she had another site that started to drain.  Patient states she removed the glue herself and allow the site to drain and stated that the site began to improve.  Approximately 3 to 4 days ago she developed severe back pain along with abdominal cramping that was progressively worsening over the past 24 hours prior to arrival.  She had self-limited  Nausea and 1 episode of  Vomiting. Over the last 24 hours she has had more frequent stools that are foul-smelling and dark in appearance.       Since arrival she has had T-max of 99.8 and has been hemodynamically stable.  Initial labs showed a neutrophilic leukocytosis (WBC 21.32), lactic acidosis (3.9), elevated creatinine 1.12.  UA  showed no bacteria or pyuria.  Creatinine of 1.53  Blood cultures  on 8/20  are  no growth to date. CT of the abdomen and pelvis which showed a partially calcified 17 mm left renal lesion that could represent a complex cyst, numerous colonic diverticuli, and inflammatory change in the left side of the pelvis could possibly represent focal colitis or diverticulitis C. difficile secondary to antibiotic usage.  C. difficile test was negative     She has a history of diverticulitis 2010 complicated by cdiff x 2 in 2010. At no time did her pain resemble her current symptoms. Exposures: she has horses, dogs, cats at home. She denies exposure to reptiles, birds or wild animals She has had no exposure to wild animals. Her SO has not been ill She ate take out food from a [employer] restaurant 8 days ago, 4 days prior to the onset of symptoms. She denies ingestion of raw eggs, unpasteurized dairy products, etc     She is currently receiving IV Zosyn, Flagyl andoral vancomycin.  ID has been consulted for further evaluation and treatment as well as antimicrobial therapy management.    8/22  Changed to tygacil yesterday  Afebrile, abdominal cramping improved but liquid stools persist - 5 last pm  She vomited once this am per Dr Medrano around the time of her tygacil dose  Fecal leukocytes and GI panel negative  8/23 afebrile, ongoing diaarrhea  Very nauseated today  Stool culture unremarkable          Review of Systems:  Constitutional--positive for appetite change.  HEENT--negative.  CV--negative  Resp--negative  GI-positive for foul-smelling diarrhea, nausea, vomiting and abdominal pain.  --negative.  Lymph-negative.   Heme-negative.  MS--positive for back pain.  Neuro--negative.  Skin--positive for surgical incision and drainage        PE:  Vital Signs  Temp  Min: 96.7 °F (35.9 °C)  Max: 98.6 °F (37 °C)  BP  Min: 156/88  Max: 175/100  Pulse  Min: 64  Max: 81  Resp  Min: 16  Max: 18  SpO2  Min: 95 %  Max: 98 %  GENERAL: Awake  and alert, in no acute distress.   HEENT: Normocephalic, atraumatic.  PERRL. EOMI. No conjunctival injection. No icterus. Oropharynx clear without evidence of thrush or exudate. No evidence of peridontal disease.    NECK: Supple without nuchal rigidity. No mass.  LYMPH: No cervical, axillary lymphadenopathy.  HEART: S1/S2 noted with no audible murmur, rubs or gallops noted.   LUNGS: CTA bilaterally with no wheezing or rhonchi noted.  Nonlabored respirations.  ABDOMEN: Soft, nontender, nondistended.  Hyperactive bowel sounds x4 quadrants.  No rebound or guarding with no appreciable HSM noted.    EXT:  No cyanosis, clubbing or edema. No cord.  :   Without Khan catheter.  MSK: FROM.    SKIN: Warm and dry without rashes or lesions noted.  She has 4 surgical incisions to the abdomen which are well-healed with well approximated edges and only mild erythema.  NEURO: Oriented to PPT. No focal deficits on motor/sensory exam at arms/legs.  PSYCHIATRIC: Normal insight and judgement. Cooperative with PE     Laboratory Data    Results from last 7 days   Lab Units 08/23/20  0622 08/22/20  0649 08/21/20  0407   WBC 10*3/mm3 10.11 12.68* 11.01*   HEMOGLOBIN g/dL 10.7* 11.1* 11.0*   HEMATOCRIT % 32.9* 34.6 35.0   PLATELETS 10*3/mm3 284 267 268     Results from last 7 days   Lab Units 08/23/20  0622   SODIUM mmol/L 140   POTASSIUM mmol/L 3.9   CHLORIDE mmol/L 108*   CO2 mmol/L 21.0*   BUN mg/dL 19   CREATININE mg/dL 0.78   GLUCOSE mg/dL 93   CALCIUM mg/dL 8.3*     Results from last 7 days   Lab Units 08/23/20  0622   ALK PHOS U/L 63   BILIRUBIN mg/dL <0.2   ALT (SGPT) U/L 14   AST (SGOT) U/L 14     Results from last 7 days   Lab Units 08/22/20  0649   SED RATE mm/hr 30     Results from last 7 days   Lab Units 08/22/20  0649   CRP mg/dL 3.81*       Estimated Creatinine Clearance: 83.2 mL/min (by C-G formula based on SCr of 0.78 mg/dL).      Microbiology:  pending    Radiology:  Imaging Results (Last 72 Hours)     ** No results  found for the last 72 hours. **          I personally reviewed the radiographic studies     Impression:   -- Sepsis presenting with WBC 21 and lactic acid 3.9, worsening acute kidney injury, surgical incision drainage and diarrhea   Focal colitis on CT scan GI panel, cdiff, stool culture all negative  ? Diverticulitis ? Mesenteric ischemia  -- Recent postoperative surgical wound infection status post laparoscopic hysterectomy and left oophorectomy on 8/3/2020-resolved with outpatient Keflex therapy.  -- Nausea likely secondary to Tygacil  -- C. difficile colitis 10 years ago  -- Neutrophilic leukocytosis-improving  -- Acute kidney injury - resolved  -- Diabetes mellitus type 2  -- Hypertension     PLAN/RECOMMENDATIONS:   Thank you for asking us to see Bhargavi Mohr, I recommend the following  -- stop  oral vancomycin and IV Flagyl since C. difficile negative  -- stop  Tygacil  -- If no improvement in diarrhea and other symptoms repeat CT abd tomorrow  -- Continue to follow cultures and sensitivity reports and adjust antibiotics accordingly  --  CRP, procalcitonin and lactic acid.  -- probiotic    Yvonne Caldwell MD  8/23/2020

## 2020-08-23 NOTE — PROGRESS NOTES
[facility] Medicine Services  PROGRESS NOTE    Patient Name: Bhargavi Mohr  : 1965  MRN: [id number]    Date of Admission: 2020  Primary Care Physician: Charu Jordan MD    Subjective   Subjective     CC:  Abdominal pain and diarrhea    HPI:  Nauseated today. Still having diarrhea, malodorous stool. No specific abdominal pain.     Review of Systems  Gen- No fevers, chills  CV- No chest pain, palpitations  Resp- No cough, dyspnea  GI- + nausea and diarrhea          Objective   Objective     Vital Signs:   Temp:  [96.7 °F (35.9 °C)-98.6 °F (37 °C)] 96.7 °F (35.9 °C)  Heart Rate:  [64-81] 66  Resp:  [18] 18  BP: (156-175)/() 162/92        Physical Exam:  Constitutional -no acute distress, non toxic, in bed  HEENT-NCAT, mucous membranes moist  CV-RRR, S1 S2 normal, no m/r/g  Resp-CTAB, no wheezes, rhonchi or rales  Abd-soft, non-tender, non-distended, normo active bowel sounds  Ext-No lower extremity cyanosis, clubbing or edema bilaterally  Neuro-alert and oriented, speech clear, moves all extremities   Psych-normal affect   Skin- No rash on exposed UE or LE bilaterally      Results Reviewed:  Results from last 7 days   Lab Units 20  0622 20  0649 20  0407   WBC 10*3/mm3 10.11 12.68* 11.01*   HEMOGLOBIN g/dL 10.7* 11.1* 11.0*   HEMATOCRIT % 32.9* 34.6 35.0   PLATELETS 10*3/mm3 284 267 268   PROCALCITONIN ng/mL  --  0.05  --      Results from last 7 days   Lab Units 20  0622 20  0649 20  0029 20  1753 20  0407   SODIUM mmol/L 140 140  --   --  143   POTASSIUM mmol/L 3.9 4.3  --   --  4.8   CHLORIDE mmol/L 108* 107  --   --  113*   CO2 mmol/L 21.0* 22.0  --   --  22.0   BUN mg/dL 19 17  --   --  16   CREATININE mg/dL 0.78 1.00  --   --  1.53*   GLUCOSE mg/dL 93 115*  --   --  127*   CALCIUM mg/dL 8.3* 8.7  --   --  8.5*   ALT (SGPT) U/L 14 14  --   --  16   AST (SGOT) U/L 14 15  --   --  23   TROPONIN T ng/mL  --   --  <0.010  <0.010  --      Estimated Creatinine Clearance: 83.2 mL/min (by C-G formula based on SCr of 0.78 mg/dL).    Microbiology Results Abnormal     Procedure Component Value - Date/Time    Stool Culture (Reference Lab) - Stool, Per Rectum [[id number]] Collected:  08/21/20 1312    Lab Status:  Preliminary result Specimen:  Stool from Per Rectum Updated:  08/23/20 1307     E coli, Shiga toxin Assay Negative    Narrative:       Performed at:  [address] [place] [address]  [id number]  : Leo Issa PhD, Phone:  [phone]    Blood Culture - Blood, Arm, Left [[id number]] Collected:  08/20/20 0322    Lab Status:  Preliminary result Specimen:  Blood from Arm, Left Updated:  08/23/20 0345     Blood Culture No growth at 3 days    Blood Culture - Blood, Arm, Left [[id number]] Collected:  08/20/20 0322    Lab Status:  Preliminary result Specimen:  Blood from Arm, Left Updated:  08/23/20 0345     Blood Culture No growth at 3 days    Gastrointestinal Panel, PCR - Stool, Per Rectum [[id number]]  (Normal) Collected:  08/21/20 1045    Lab Status:  Final result Specimen:  Stool from Per Rectum Updated:  08/21/20 1426     Campylobacter Not Detected     Plesiomonas shigelloides Not Detected     Salmonella Not Detected     Vibrio Not Detected     Vibrio cholerae Not Detected     Yersinia enterocolitica Not Detected     Enteroaggregative E. coli (EAEC) Not Detected     Enteropathogenic E. coli (EPEC) Not Detected     Enterotoxigenic E. coli (ETEC) lt/st Not Detected     Shiga-like toxin-producing E. coli (STEC) stx1/stx2 Not Detected     E. coli O157 Not Detected     Shigella/Enteroinvasive E. coli (EIEC) Not Detected     Cryptosporidium Not Detected     Cyclospora cayetanensis Not Detected     Entamoeba histolytica Not Detected     Giardia lamblia Not Detected     Adenovirus F40/41 Not Detected     Astrovirus Not Detected     Norovirus GI/GII Not Detected     Rotavirus A Not Detected     Sapovirus (I, II, IV  or V) Not Detected    Fecal Leukocytes - Stool, Per Rectum [[id number]]  (Normal) Collected:  08/21/20 1045    Lab Status:  Final result Specimen:  Stool from Per Rectum Updated:  08/21/20 1416     Fecal Leukocytes No WBC's Seen    Clostridium Difficile Toxin - Stool, Per Rectum [[id number]] Collected:  08/21/20 1045    Lab Status:  Final result Specimen:  Stool from Per Rectum Updated:  08/21/20 1154    Narrative:       The following orders were created for panel order Clostridium Difficile Toxin - Stool, Per Rectum.  Procedure                               Abnormality         Status                     ---------                               -----------         ------                     Clostridium Difficile To...[[id number]]  Normal              Final result                 Please view results for these tests on the individual orders.    Clostridium Difficile Toxin, PCR - Stool, Per Rectum [[id number]]  (Normal) Collected:  08/21/20 1045    Lab Status:  Final result Specimen:  Stool from Per Rectum Updated:  08/21/20 1154     C. Difficile Toxins by PCR Not Detected    Narrative:       Performance characteristics of test not established for patients <2 years of age.  Negative for Toxigenic C. Difficile          Imaging Results (Last 24 Hours)     ** No results found for the last 24 hours. **               I have reviewed the medications:  Scheduled Meds:    amLODIPine 5 mg Oral Q24H   FLUoxetine 20 mg Oral Daily   insulin lispro 0-7 Units Subcutaneous TID AC   lactobacillus acidophilus 1 capsule Oral Daily   rosuvastatin 40 mg Oral Daily     Continuous Infusions:    sodium chloride 50 mL/hr Last Rate: 50 mL/hr (08/23/20 1232)     PRN Meds:.•  acetaminophen  •  ALPRAZolam  •  calcium carbonate EX  •  dextrose  •  dextrose  •  glucagon (human recombinant)  •  labetalol  •  magnesium sulfate **OR** magnesium sulfate **OR** magnesium sulfate  •  Morphine **AND** naloxone  •  ondansetron  •  potassium chloride **OR**  "potassium chloride **OR** potassium chloride  •  sodium chloride    Assessment/Plan   Assessment & Plan     Active Hospital Problems    Diagnosis  POA   • **Sepsis without acute organ dysfunction (CMS/HCC) [A41.9]  Yes   • Type 2 diabetes mellitus (CMS/HCC) [E11.9]  Yes   • History of melanoma [Z85.820]  Not Applicable   • Essential hypertension [I10]  Yes   • Diverticulosis [K57.90]  Yes   • Hypokalemia [E87.6]  Unknown   • HERO (acute kidney injury) (CMS/HCC) [N17.9]  Unknown      Resolved Hospital Problems   No resolved problems to display.        Brief Hospital Course to date:  Bhargavi Mohr is a 55 y.o. female with history of anxiety, DL, HTN, diverticulosis, DMII, prior C diff colitis and melanoma, with recent laparoscopic hysterectomy and slapingoophorectomy (8/3/20 Dr Marmolejo), who just finished a course of keflex for redness and drainage at one of her laparoscopic incision sites, presents with chills, abdominal pain and loose dark stool.    Sepsis  - leukocytosis (WBC 21), lactic acidosis (lactate 3.9) and HERO, all secondary to Colitis    Colitis  - C diff PCR and GI panel negative  - loose stool continues but abdominal pain and leukocytosis have resolved  - WBC count today 10.1  - continues to have nausea (secondary to Tygacil?)  - will observe off antibiotics -- if continued symptoms in am will need repeat CT abdomen pelvis  - probiotic    Acute Kidney injury  - suspect secondary to sepsis and volume depletion from diarrhea, in setting of home NSAID, ACEi and diuretic use.  - prehospitalization creatinine 0.73, with peak creatinine 1.53. Creatinine improved to 0.78 today with IV hydration and continued holding of home lisinopril and hctz.    Renal lesion  - discussed with patient, will need dedicated renal scan (with contrast as possible) as an outpatient. Follow up with PCP next week.     Chest tightness  - patient says she felt some epigastric pain and a sensation of a \"band\" around her lower ribs " Friday (8/21) evening. EKG without ST changes. Troponin x 2 negative. Patient says symptoms much better after taking tylenol and changing position in bed.    Lactic acidosis  - resolved    DMII  - a1c 6.5    HTN  - HCTZ and lisinopril held  - add prn labetolol    Anxiety    DVT Prophylaxis:  Mechanical, ambulate, lovenox      Disposition: I expect the patient to be discharged home in 1-2 days.      CODE STATUS:   Code Status and Medical Interventions:   Ordered at: 08/20/20 0601     Level Of Support Discussed With:    Patient     Code Status:    CPR     Medical Interventions (Level of Support Prior to Arrest):    Full         Electronically signed by Elieser Medrano MD, 08/23/20, 16:11.

## 2020-08-23 NOTE — PLAN OF CARE
Pt A&Ox4, VSS on RA, sinus arrhythmia on the monitor. NS going @ 125. Pt states she has been having diarrhea. Eager to go home. Will continue to monitor.

## 2020-08-24 LAB
CRP SERPL-MCNC: 1.36 MG/DL (ref 0–0.5)
GLUCOSE BLDC GLUCOMTR-MCNC: 109 MG/DL (ref 70–130)
GLUCOSE BLDC GLUCOMTR-MCNC: 111 MG/DL (ref 70–130)
GLUCOSE BLDC GLUCOMTR-MCNC: 118 MG/DL (ref 70–130)
GLUCOSE BLDC GLUCOMTR-MCNC: 180 MG/DL (ref 70–130)

## 2020-08-24 PROCEDURE — 25010000002 ENOXAPARIN PER 10 MG: Performed by: INTERNAL MEDICINE

## 2020-08-24 PROCEDURE — 82962 GLUCOSE BLOOD TEST: CPT

## 2020-08-24 PROCEDURE — 86140 C-REACTIVE PROTEIN: CPT | Performed by: INTERNAL MEDICINE

## 2020-08-24 PROCEDURE — 99232 SBSQ HOSP IP/OBS MODERATE 35: CPT | Performed by: INTERNAL MEDICINE

## 2020-08-24 RX ADMIN — HYDROCORTISONE: 1 CREAM TOPICAL at 08:25

## 2020-08-24 RX ADMIN — Medication 1 CAPSULE: at 08:24

## 2020-08-24 RX ADMIN — SODIUM CHLORIDE, PRESERVATIVE FREE 10 ML: 5 INJECTION INTRAVENOUS at 21:21

## 2020-08-24 RX ADMIN — SODIUM CHLORIDE 50 ML/HR: 9 INJECTION, SOLUTION INTRAVENOUS at 06:59

## 2020-08-24 RX ADMIN — HYDROCORTISONE: 1 CREAM TOPICAL at 21:20

## 2020-08-24 RX ADMIN — ENOXAPARIN SODIUM 40 MG: 40 INJECTION SUBCUTANEOUS at 21:20

## 2020-08-24 RX ADMIN — SODIUM CHLORIDE, PRESERVATIVE FREE 10 ML: 5 INJECTION INTRAVENOUS at 08:25

## 2020-08-24 RX ADMIN — AMLODIPINE BESYLATE 5 MG: 5 TABLET ORAL at 08:24

## 2020-08-24 RX ADMIN — FLUOXETINE HYDROCHLORIDE 20 MG: 20 CAPSULE ORAL at 08:24

## 2020-08-24 RX ADMIN — ACETAMINOPHEN 650 MG: 325 TABLET, FILM COATED ORAL at 03:21

## 2020-08-24 RX ADMIN — ROSUVASTATIN CALCIUM 40 MG: 20 TABLET, COATED ORAL at 08:24

## 2020-08-24 NOTE — PAYOR COMM NOTE
"Anastacia Mae RN  Utilization Review  P: [phone]  F: [phone]    Ref # [id number]  Updated clinicals for continued stay    Bhargavi Alexis (55 y.o. Female)     Date of Birth Social Security Number Address Home Phone MRN    1965  [address]  [facility] [address] [phone] [id number]    Advent Marital Status          None        Admission Date Admission Type Admitting Provider Attending Provider Department, Room/Bed    20 Emergency Clarence Skaggs MD Dossett, Lee M, MD [facility] 3E, S338/1    Discharge Date Discharge Disposition Discharge Destination                       Attending Provider:  Clarence Skaggs MD    Allergies:  Codeine, Percocet [Oxycodone-acetaminophen], Adhesive Tape    Isolation:  None   Infection:  None   Code Status:  CPR    Ht:  165.1 cm (65\")   Wt:  71.4 kg (157 lb 6.4 oz)    Admission Cmt:  None   Principal Problem:  Sepsis without acute organ dysfunction (CMS/HCC) [A41.9]                 Active Insurance as of 2020     Primary Coverage     Payor Plan Insurance Group Employer/Plan Group    ANTHEM BLUE CROSS ANTHEM BLUE CROSS BLUE SHIELD PPO [id number]     Payor Plan Address Payor Plan Phone Number Payor Plan Fax Number Effective Dates    [address] [phone]  2018 - None Entered    [facility] [id number]       Subscriber Name Subscriber Birth Date Member ID       BHARGAVI ALEIXS 1965 [id number]                 Emergency Contacts      (Rel.) Home Phone Work Phone Mobile Phone    billie marie (Daughter) -- -- [phone]    kaushal sutton (Significant Other) -- -- [phone]               Physician Progress Notes (last 72 hours) (Notes from 20 0946 through 20 0946)      Elieser Medrano MD at 20 1611              [facility] Medicine Services  PROGRESS NOTE    Patient Name: Bhargavi Alexis  : 1965  MRN: [id number]    Date of Admission: " 8/20/2020  Primary Care Physician: Charu Jordan MD    Subjective   Subjective     CC:  Abdominal pain and diarrhea    HPI:  Nauseated today. Still having diarrhea, malodorous stool. No specific abdominal pain.     Review of Systems  Gen- No fevers, chills  CV- No chest pain, palpitations  Resp- No cough, dyspnea  GI- + nausea and diarrhea          Objective   Objective     Vital Signs:   Temp:  [96.7 °F (35.9 °C)-98.6 °F (37 °C)] 96.7 °F (35.9 °C)  Heart Rate:  [64-81] 66  Resp:  [18] 18  BP: (156-175)/() 162/92        Physical Exam:  Constitutional -no acute distress, non toxic, in bed  HEENT-NCAT, mucous membranes moist  CV-RRR, S1 S2 normal, no m/r/g  Resp-CTAB, no wheezes, rhonchi or rales  Abd-soft, non-tender, non-distended, normo active bowel sounds  Ext-No lower extremity cyanosis, clubbing or edema bilaterally  Neuro-alert and oriented, speech clear, moves all extremities   Psych-normal affect   Skin- No rash on exposed UE or LE bilaterally      Results Reviewed:  Results from last 7 days   Lab Units 08/23/20  0622 08/22/20  0649 08/21/20  0407   WBC 10*3/mm3 10.11 12.68* 11.01*   HEMOGLOBIN g/dL 10.7* 11.1* 11.0*   HEMATOCRIT % 32.9* 34.6 35.0   PLATELETS 10*3/mm3 284 267 268   PROCALCITONIN ng/mL  --  0.05  --      Results from last 7 days   Lab Units 08/23/20  0622 08/22/20  0649 08/22/20  0029 08/21/20  1753 08/21/20  0407   SODIUM mmol/L 140 140  --   --  143   POTASSIUM mmol/L 3.9 4.3  --   --  4.8   CHLORIDE mmol/L 108* 107  --   --  113*   CO2 mmol/L 21.0* 22.0  --   --  22.0   BUN mg/dL 19 17  --   --  16   CREATININE mg/dL 0.78 1.00  --   --  1.53*   GLUCOSE mg/dL 93 115*  --   --  127*   CALCIUM mg/dL 8.3* 8.7  --   --  8.5*   ALT (SGPT) U/L 14 14  --   --  16   AST (SGOT) U/L 14 15  --   --  23   TROPONIN T ng/mL  --   --  <0.010 <0.010  --      Estimated Creatinine Clearance: 83.2 mL/min (by C-G formula based on SCr of 0.78 mg/dL).    Microbiology Results Abnormal     Procedure  Component Value - Date/Time    Stool Culture (Reference Lab) - Stool, Per Rectum [[id number]] Collected:  08/21/20 1312    Lab Status:  Preliminary result Specimen:  Stool from Per Rectum Updated:  08/23/20 1307     E coli, Shiga toxin Assay Negative    Narrative:       Performed at:  [address] [place] [address]  [id number]  : Leo Issa PhD, Phone:  [phone]    Blood Culture - Blood, Arm, Left [[id number]] Collected:  08/20/20 0322    Lab Status:  Preliminary result Specimen:  Blood from Arm, Left Updated:  08/23/20 0345     Blood Culture No growth at 3 days    Blood Culture - Blood, Arm, Left [[id number]] Collected:  08/20/20 0322    Lab Status:  Preliminary result Specimen:  Blood from Arm, Left Updated:  08/23/20 0345     Blood Culture No growth at 3 days    Gastrointestinal Panel, PCR - Stool, Per Rectum [[id number]]  (Normal) Collected:  08/21/20 1045    Lab Status:  Final result Specimen:  Stool from Per Rectum Updated:  08/21/20 1426     Campylobacter Not Detected     Plesiomonas shigelloides Not Detected     Salmonella Not Detected     Vibrio Not Detected     Vibrio cholerae Not Detected     Yersinia enterocolitica Not Detected     Enteroaggregative E. coli (EAEC) Not Detected     Enteropathogenic E. coli (EPEC) Not Detected     Enterotoxigenic E. coli (ETEC) lt/st Not Detected     Shiga-like toxin-producing E. coli (STEC) stx1/stx2 Not Detected     E. coli O157 Not Detected     Shigella/Enteroinvasive E. coli (EIEC) Not Detected     Cryptosporidium Not Detected     Cyclospora cayetanensis Not Detected     Entamoeba histolytica Not Detected     Giardia lamblia Not Detected     Adenovirus F40/41 Not Detected     Astrovirus Not Detected     Norovirus GI/GII Not Detected     Rotavirus A Not Detected     Sapovirus (I, II, IV or V) Not Detected    Fecal Leukocytes - Stool, Per Rectum [[id number]]  (Normal) Collected:  08/21/20 1045    Lab Status:  Final result Specimen:   Stool from Per Rectum Updated:  08/21/20 1416     Fecal Leukocytes No WBC's Seen    Clostridium Difficile Toxin - Stool, Per Rectum [[id number]] Collected:  08/21/20 1045    Lab Status:  Final result Specimen:  Stool from Per Rectum Updated:  08/21/20 1154    Narrative:       The following orders were created for panel order Clostridium Difficile Toxin - Stool, Per Rectum.  Procedure                               Abnormality         Status                     ---------                               -----------         ------                     Clostridium Difficile To...[[id number]]  Normal              Final result                 Please view results for these tests on the individual orders.    Clostridium Difficile Toxin, PCR - Stool, Per Rectum [[id number]]  (Normal) Collected:  08/21/20 1045    Lab Status:  Final result Specimen:  Stool from Per Rectum Updated:  08/21/20 1154     C. Difficile Toxins by PCR Not Detected    Narrative:       Performance characteristics of test not established for patients <2 years of age.  Negative for Toxigenic C. Difficile          Imaging Results (Last 24 Hours)     ** No results found for the last 24 hours. **               I have reviewed the medications:  Scheduled Meds:    amLODIPine 5 mg Oral Q24H   FLUoxetine 20 mg Oral Daily   insulin lispro 0-7 Units Subcutaneous TID AC   lactobacillus acidophilus 1 capsule Oral Daily   rosuvastatin 40 mg Oral Daily     Continuous Infusions:    sodium chloride 50 mL/hr Last Rate: 50 mL/hr (08/23/20 1232)     PRN Meds:.•  acetaminophen  •  ALPRAZolam  •  calcium carbonate EX  •  dextrose  •  dextrose  •  glucagon (human recombinant)  •  labetalol  •  magnesium sulfate **OR** magnesium sulfate **OR** magnesium sulfate  •  Morphine **AND** naloxone  •  ondansetron  •  potassium chloride **OR** potassium chloride **OR** potassium chloride  •  sodium chloride    Assessment/Plan   Assessment & Plan     Active Hospital Problems    Diagnosis  POA  "  • **Sepsis without acute organ dysfunction (CMS/HCC) [A41.9]  Yes   • Type 2 diabetes mellitus (CMS/HCC) [E11.9]  Yes   • History of melanoma [Z85.820]  Not Applicable   • Essential hypertension [I10]  Yes   • Diverticulosis [K57.90]  Yes   • Hypokalemia [E87.6]  Unknown   • HERO (acute kidney injury) (CMS/HCC) [N17.9]  Unknown      Resolved Hospital Problems   No resolved problems to display.        Brief Hospital Course to date:  Bhargavi Mohr is a 55 y.o. female with history of anxiety, DL, HTN, diverticulosis, DMII, prior C diff colitis and melanoma, with recent laparoscopic hysterectomy and slapingoophorectomy (8/3/20 Dr Marmolejo), who just finished a course of keflex for redness and drainage at one of her laparoscopic incision sites, presents with chills, abdominal pain and loose dark stool.    Sepsis  - leukocytosis (WBC 21), lactic acidosis (lactate 3.9) and HERO, all secondary to Colitis    Colitis  - C diff PCR and GI panel negative  - loose stool continues but abdominal pain and leukocytosis have resolved  - WBC count today 10.1  - continues to have nausea (secondary to Tygacil?)  - will observe off antibiotics -- if continued symptoms in am will need repeat CT abdomen pelvis  - probiotic    Acute Kidney injury  - suspect secondary to sepsis and volume depletion from diarrhea, in setting of home NSAID, ACEi and diuretic use.  - prehospitalization creatinine 0.73, with peak creatinine 1.53. Creatinine improved to 0.78 today with IV hydration and continued holding of home lisinopril and hctz.    Renal lesion  - discussed with patient, will need dedicated renal scan (with contrast as possible) as an outpatient. Follow up with PCP next week.     Chest tightness  - patient says she felt some epigastric pain and a sensation of a \"band\" around her lower ribs Friday (8/21) evening. EKG without ST changes. Troponin x 2 negative. Patient says symptoms much better after taking tylenol and changing position in " bed.    Lactic acidosis  - resolved    DMII  - a1c 6.5    HTN  - HCTZ and lisinopril held  - add prn labetolol    Anxiety    DVT Prophylaxis:  Mechanical, ambulate, lovenox      Disposition: I expect the patient to be discharged home in 1-2 days.      CODE STATUS:   Code Status and Medical Interventions:   Ordered at: 08/20/20 0601     Level Of Support Discussed With:    Patient     Code Status:    CPR     Medical Interventions (Level of Support Prior to Arrest):    Full         Electronically signed by Elieser Medrano MD, 08/23/20, 16:11.          Electronically signed by Elieser Medrano MD at 08/23/20 1619     Yvonne Caldwell MD at 08/23/20 1441          [facility] Progress Note    Admission Date: 8/20/2020    Bhargavi Mohr  1965  [id number]    Date: 8/23/2020    Antibiotics:  Anti-Infectives (From admission, onward)    Ordered     Dose/Rate Route Frequency Start Stop    08/20/20 0342  piperacillin-tazobactam (ZOSYN) 3.375 g in iso-osmotic dextrose 50 ml (premix)     Ordering Provider:  Malgorzata Torres MD    3.375 g  over 30 Minutes Intravenous Once 08/20/20 0344 08/20/20 0550             Chief complaint: Abdominal pain     History of present illness:    Ms. Bhargavi Mohr is a 54 y.o. female who is being evaluated for sepsis.  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  She presented to the ED on 8/20 with complaints of abdominal pain.  Patient reportedly underwent a laparoscopic hysterectomy with left oophorectomy secondary to dysplasia of the cervix and high-grade EDMUND-2 on 8/3/2020.  Approximately 1 week ago she noted that 1 of the incision sites began draining and she presented to the [facility] he was prescribed Keflex.  She followed up with Dr. Guevara's office who removed the stitch and then packed the incision with gauze.  Approximately 4 days later she had another  site that started to drain.  Patient states she removed the glue herself and allow the site to drain and stated that the site began to improve.  Approximately 3 to 4 days ago she developed severe back pain along with abdominal cramping that was progressively worsening over the past 24 hours prior to arrival.  She had self-limited  Nausea and 1 episode of  Vomiting. Over the last 24 hours she has had more frequent stools that are foul-smelling and dark in appearance.       Since arrival she has had T-max of 99.8 and has been hemodynamically stable.  Initial labs showed a neutrophilic leukocytosis (WBC 21.32), lactic acidosis (3.9), elevated creatinine 1.12.  UA showed no bacteria or pyuria.  Creatinine of 1.53  Blood cultures  on 8/20  are  no growth to date. CT of the abdomen and pelvis which showed a partially calcified 17 mm left renal lesion that could represent a complex cyst, numerous colonic diverticuli, and inflammatory change in the left side of the pelvis could possibly represent focal colitis or diverticulitis C. difficile secondary to antibiotic usage.  C. difficile test was negative     She has a history of diverticulitis 2010 complicated by cdiff x 2 in 2010. At no time did her pain resemble her current symptoms. Exposures: she has horses, dogs, cats at home. She denies exposure to reptiles, birds or wild animals She has had no exposure to wild animals. Her SO has not been ill She ate take out food from a [employer] restaurant 8 days ago, 4 days prior to the onset of symptoms. She denies ingestion of raw eggs, unpasteurized dairy products, etc     She is currently receiving IV Zosyn, Flagyl andoral vancomycin.  ID has been consulted for further evaluation and treatment as well as antimicrobial therapy management.    8/22  Changed to tygacil yesterday  Afebrile, abdominal cramping improved but liquid stools persist - 5 last pm  She vomited once this am per Dr Medrano around the time of her tygacil dose   Fecal leukocytes and GI panel negative  8/23 afebrile, ongoing diaarrhea  Very nauseated today  Stool culture unremarkable          Review of Systems:  Constitutional--positive for appetite change.  HEENT--negative.  CV--negative  Resp--negative  GI-positive for foul-smelling diarrhea, nausea, vomiting and abdominal pain.  --negative.  Lymph-negative.   Heme-negative.  MS--positive for back pain.  Neuro--negative.  Skin--positive for surgical incision and drainage        PE:  Vital Signs  Temp  Min: 96.7 °F (35.9 °C)  Max: 98.6 °F (37 °C)  BP  Min: 156/88  Max: 175/100  Pulse  Min: 64  Max: 81  Resp  Min: 16  Max: 18  SpO2  Min: 95 %  Max: 98 %  GENERAL: Awake and alert, in no acute distress.   HEENT: Normocephalic, atraumatic.  PERRL. EOMI. No conjunctival injection. No icterus. Oropharynx clear without evidence of thrush or exudate. No evidence of peridontal disease.    NECK: Supple without nuchal rigidity. No mass.  LYMPH: No cervical, axillary lymphadenopathy.  HEART: S1/S2 noted with no audible murmur, rubs or gallops noted.   LUNGS: CTA bilaterally with no wheezing or rhonchi noted.  Nonlabored respirations.  ABDOMEN: Soft, nontender, nondistended.  Hyperactive bowel sounds x4 quadrants.  No rebound or guarding with no appreciable HSM noted.    EXT:  No cyanosis, clubbing or edema. No cord.  :   Without Khan catheter.  MSK: FROM.    SKIN: Warm and dry without rashes or lesions noted.  She has 4 surgical incisions to the abdomen which are well-healed with well approximated edges and only mild erythema.  NEURO: Oriented to PPT. No focal deficits on motor/sensory exam at arms/legs.  PSYCHIATRIC: Normal insight and judgement. Cooperative with PE     Laboratory Data    Results from last 7 days   Lab Units 08/23/20  0622 08/22/20  0649 08/21/20  0407   WBC 10*3/mm3 10.11 12.68* 11.01*   HEMOGLOBIN g/dL 10.7* 11.1* 11.0*   HEMATOCRIT % 32.9* 34.6 35.0   PLATELETS 10*3/mm3 284 267 268     Results from last 7 days    Lab Units 08/23/20  0622   SODIUM mmol/L 140   POTASSIUM mmol/L 3.9   CHLORIDE mmol/L 108*   CO2 mmol/L 21.0*   BUN mg/dL 19   CREATININE mg/dL 0.78   GLUCOSE mg/dL 93   CALCIUM mg/dL 8.3*     Results from last 7 days   Lab Units 08/23/20  0622   ALK PHOS U/L 63   BILIRUBIN mg/dL <0.2   ALT (SGPT) U/L 14   AST (SGOT) U/L 14     Results from last 7 days   Lab Units 08/22/20  0649   SED RATE mm/hr 30     Results from last 7 days   Lab Units 08/22/20  0649   CRP mg/dL 3.81*       Estimated Creatinine Clearance: 83.2 mL/min (by C-G formula based on SCr of 0.78 mg/dL).      Microbiology:  pending    Radiology:  Imaging Results (Last 72 Hours)     ** No results found for the last 72 hours. **          I personally reviewed the radiographic studies     Impression:   -- Sepsis presenting with WBC 21 and lactic acid 3.9, worsening acute kidney injury, surgical incision drainage and diarrhea   Focal colitis on CT scan GI panel, cdiff, stool culture all negative  ? Diverticulitis ? Mesenteric ischemia  -- Recent postoperative surgical wound infection status post laparoscopic hysterectomy and left oophorectomy on 8/3/2020-resolved with outpatient Keflex therapy.  -- Nausea likely secondary to Tygacil  -- C. difficile colitis 10 years ago  -- Neutrophilic leukocytosis-improving  -- Acute kidney injury - resolved  -- Diabetes mellitus type 2  -- Hypertension     PLAN/RECOMMENDATIONS:   Thank you for asking us to see Bhargavi Mohr, I recommend the following  -- stop  oral vancomycin and IV Flagyl since C. difficile negative  --  stop  Tygacil  -- If no improvement in diarrhea and other symptoms repeat CT abd tomorrow  -- Continue to follow cultures and sensitivity reports and adjust antibiotics accordingly  --  CRP, procalcitonin and lactic acid.  -- probiotic    Yvonne Caldwell MD  8/23/2020          Electronically signed by Yvonne Caldwell MD at 08/23/20 2217     Yvonne Caldwell MD at 08/22/20 6260           [facility] Progress Note    Admission Date: 8/20/2020    Bhargavi Mohr  1965  [id number]    Date: 8/22/2020    Antibiotics:  Anti-Infectives (From admission, onward)    Ordered     Dose/Rate Route Frequency Start Stop    08/21/20 1241  tigecycline (TYGACIL) 50 mg/100 mL 0.9% NS IVPB (mbp)  Review   Ordering Provider:  Yvonne Caldwell MD    50 mg  over 30 Minutes Intravenous Every 12 Hours Scheduled 08/21/20 1400 08/28/20 0859    08/20/20 0342  piperacillin-tazobactam (ZOSYN) 3.375 g in iso-osmotic dextrose 50 ml (premix)     Ordering Provider:  Malgorzata Torres MD    3.375 g  over 30 Minutes Intravenous Once 08/20/20 0344 08/20/20 0550             Chief complaint: Abdominal pain     History of present illness:    Ms. Bhargavi Mohr is a 54 y.o. female who is being evaluated for sepsis.  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  She presented to the ED on 8/20 with complaints of abdominal pain.  Patient reportedly underwent a laparoscopic hysterectomy with left oophorectomy secondary to dysplasia of the cervix and high-grade EDMUND-2 on 8/3/2020.  Approximately 1 week ago she noted that 1 of the incision sites began draining and she presented to the [facility] he was prescribed Keflex.  She followed up with Dr. Guevara's office who removed the stitch and then packed the incision with gauze.  Approximately 4 days later she had another site that started to drain.  Patient states she removed the glue herself and allow the site to drain and stated that the site began to improve.  Approximately 3 to 4 days ago she developed severe back pain along with abdominal cramping that was progressively worsening over the past 24 hours prior to arrival.  She had self-limited  Nausea and 1 episode of  Vomiting. Over the last 24 hours she has had more frequent stools that are foul-smelling and  dark in appearance.       Since arrival she has had T-max of 99.8 and has been hemodynamically stable.  Initial labs showed a neutrophilic leukocytosis (WBC 21.32), lactic acidosis (3.9), elevated creatinine 1.12.  UA showed no bacteria or pyuria.  Creatinine of 1.53  Blood cultures  on 8/20  are  no growth to date. CT of the abdomen and pelvis which showed a partially calcified 17 mm left renal lesion that could represent a complex cyst, numerous colonic diverticuli, and inflammatory change in the left side of the pelvis could possibly represent focal colitis or diverticulitis C. difficile secondary to antibiotic usage.  C. difficile test was negative     She has a history of diverticulitis 2010 complicated by cdiff x 2 in 2010. At no time did her pain resemble her current symptoms. Exposures: she has horses, dogs, cats at home. She denies exposure to reptiles, birds or wild animals She has had no exposure to wild animals. Her SO has not been ill She ate take out food from a [employer] restaurant 8 days ago, 4 days prior to the onset of symptoms. She denies ingestion of raw eggs, unpasteurized dairy products, etc     She is currently receiving IV Zosyn, Flagyl andoral vancomycin.  ID has been consulted for further evaluation and treatment as well as antimicrobial therapy management.    8/22  Changed to tygacil yesterday  Afebrile, abdominal cramping improved but liquid stools persist - 5 last pm  She vomited once this am per Dr Medrano around the time of her tygacil dose  Fecal leukocytes and GI panel negative          Review of Systems:  Constitutional--positive for appetite change.  HEENT--negative.  CV--negative  Resp--negative  GI-positive for foul-smelling diarrhea, nausea, vomiting and abdominal pain.  --negative.  Lymph-negative.   Heme-negative.  MS--positive for back pain.  Neuro--negative.  Skin--positive for surgical incision and drainage        PE:  Vital Signs  Temp  Min: 97.8 °F (36.6 °C)  Max: 98.7 °F  (37.1 °C)  BP  Min: 153/84  Max: 162/84  Pulse  Min: 66  Max: 89  Resp  Min: 16  Max: 18  SpO2  Min: 95 %  Max: 98 %  GENERAL: Awake and alert, in no acute distress.   HEENT: Normocephalic, atraumatic.  PERRL. EOMI. No conjunctival injection. No icterus. Oropharynx clear without evidence of thrush or exudate. No evidence of peridontal disease.    NECK: Supple without nuchal rigidity. No mass.  LYMPH: No cervical, axillary lymphadenopathy.  HEART: S1/S2 noted with no audible murmur, rubs or gallops noted.   LUNGS: CTA bilaterally with no wheezing or rhonchi noted.  Nonlabored respirations.  ABDOMEN: Soft, nontender, nondistended.  Hyperactive bowel sounds x4 quadrants.  No rebound or guarding with no appreciable HSM noted.    EXT:  No cyanosis, clubbing or edema. No cord.  :   Without Khan catheter.  MSK: FROM.    SKIN: Warm and dry without rashes or lesions noted.  She has 4 surgical incisions to the abdomen which are well-healed with well approximated edges and only mild erythema.  NEURO: Oriented to PPT. No focal deficits on motor/sensory exam at arms/legs.  PSYCHIATRIC: Normal insight and judgement. Cooperative with PE     Laboratory Data    Results from last 7 days   Lab Units 08/22/20  0649 08/21/20  0407 08/20/20  1242   WBC 10*3/mm3 12.68* 11.01* 12.35*   HEMOGLOBIN g/dL 11.1* 11.0* 12.1   HEMATOCRIT % 34.6 35.0 37.5   PLATELETS 10*3/mm3 267 268 271     Results from last 7 days   Lab Units 08/22/20  0649   SODIUM mmol/L 140   POTASSIUM mmol/L 4.3   CHLORIDE mmol/L 107   CO2 mmol/L 22.0   BUN mg/dL 17   CREATININE mg/dL 1.00   GLUCOSE mg/dL 115*   CALCIUM mg/dL 8.7     Results from last 7 days   Lab Units 08/22/20  0649   ALK PHOS U/L 62   BILIRUBIN mg/dL 0.2   ALT (SGPT) U/L 14   AST (SGOT) U/L 15     Results from last 7 days   Lab Units 08/22/20  0649   SED RATE mm/hr 30     Results from last 7 days   Lab Units 08/22/20  0649   CRP mg/dL 3.81*       Estimated Creatinine Clearance: 65.9 mL/min (by C-G  formula based on SCr of 1 mg/dL).      Microbiology:  pending    Radiology:  Imaging Results (Last 72 Hours)     Procedure Component Value Units Date/Time    CT Abdomen Pelvis Without Contrast [[id number]] Collected:  08/20/20 0327     Updated:  08/20/20 0329    Narrative:       CT Abdomen Pelvis WO    INDICATION:   Abdominal pain all over for 3 days    TECHNIQUE:   CT of the abdomen and pelvis without IV contrast. Coronal and sagittal reconstructions were obtained.  Radiation dose reduction techniques included automated exposure control or exposure modulation based on body size. Count of known CT and cardiac nuc  med studies performed in previous 12 months: 0.     COMPARISON:   None available.    FINDINGS:  Abdomen: Lung bases are clear the liver, gallbladder, spleen, pancreas, adrenal glands and right kidney normal.    The left kidney has an oval lesion in the midportion of the kidney. This is just anterior to the renal parenchyma. It measures about 17 mm maximum dimension and is partially calcified. This is likely a complex cyst. The aneurysm is less likely.    The aorta is normal in size. There is no adenopathy. The bowel shows numerous left colon diverticuli. There seems be some mild inflammatory stranding in the left side of the pelvis. I cannot clearly identify any site of diverticulitis there is no colon  wall thickening identified.    Pelvis: Bladder is normal. The uterus is been removed. There are no adnexal masses. Bones are unremarkable.      Impression:       Partially calcified 17 mm left renal lesion. This could represent a complex cyst, solid mass within the aneurysm. Follow-up as an outpatient with multiphase renal CT scan is recommended.    Numerous colonic diverticuli    This seems be some subtle inflammatory change in the left side of the pelvis. This is best appreciated on image 72. This could possibly represent focal colitis or diverticulitis.          Signer Name: Javier Lima MD   Signed:  "2020 3:27 AM   Workstation Name: [id number]    [facility]          I personally reviewed the radiographic studies     Impression:   1. Sepsis presenting with neutrophilic leukocytosis (21.32), lactic acidosis (3.9), worsening acute kidney injury, surgical incision drainage and diarrhea  2. Recent postoperative surgical wound infection status post laparoscopic hysterectomy and left oophorectomy on 8/3/2020-resolved with outpatient Keflex therapy.  3. C. difficile colitis  4. Neutrophilic leukocytosis-improving  5. Acute kidney injury-worsening  6. Lactic acidosis-improving  7. Diverticulosis  8. Diabetes mellitus type 2  9. Hypertension     PLAN/RECOMMENDATIONS:   Thank you for asking us to see Bhargavi HAWK [place]hawn, I recommend the following  -- stop  oral vancomycin and IV Flagyl since C. difficile negative  -- Change IV Zosyn to Tygacil for diverticulosis/diverticulitis for now  -- GI panel, stool culture, fecal leukocytes  -- Continue to follow cultures and sensitivity reports and adjust antibiotics accordingly  -- Continue to follow labs while in hospital to include CBC, CMP, ESR, CRP, procalcitonin and lactic acid.  -- probiotic    Yvonne Caldwell MD  2020          Electronically signed by Yvonne Caldwell MD at 20 1134     Elieser Medrano MD at 20 0858              [facility] Medicine Services  PROGRESS NOTE    Patient Name: Bhargavi Mohr  : 1965  MRN: [id number]    Date of Admission: 2020  Primary Care Physician: Charu Jordan MD    Subjective   Subjective     CC:  Abdominal pain and diarrhea    HPI:  Still having watery stool, 5 bms since midnight. Abdominal pain has largely resolved, but patient says she still has some discomfort \"like I am having a lot of diarrhea.\"  Emesis x 1 this morning, new.    Review of Systems  Gen- No fevers, chills  CV- No chest pain, palpitations  Resp- No cough, dyspnea  GI- + emesis, + " loose stool as above.        Objective   Objective     Vital Signs:   Temp:  [97.8 °F (36.6 °C)-98.7 °F (37.1 °C)] 98.7 °F (37.1 °C)  Heart Rate:  [66-89] 78  Resp:  [16-18] 16  BP: (153-164)/(76-96) 162/84        Physical Exam:  Constitutional -no acute distress, non toxic, in bed  HEENT-NCAT, mucous membranes moist  CV-RRR, S1 S2 normal, no m/r/g  Resp-CTAB, no wheezes, rhonchi or rales  Abd-soft, non-tender, non-distended, normo active bowel sounds  Ext-No lower extremity cyanosis, clubbing or edema bilaterally  Neuro-alert and oriented, speech clear, moves all extremities   Psych-normal affect   Skin- No rash on exposed UE or LE bilaterally    Results Reviewed:  Results from last 7 days   Lab Units 08/22/20  0649 08/21/20  0407 08/20/20  1242   WBC 10*3/mm3 12.68* 11.01* 12.35*   HEMOGLOBIN g/dL 11.1* 11.0* 12.1   HEMATOCRIT % 34.6 35.0 37.5   PLATELETS 10*3/mm3 267 268 271   PROCALCITONIN ng/mL 0.05  --   --      Results from last 7 days   Lab Units 08/22/20  0649 08/22/20  0029 08/21/20  1753 08/21/20  0407 08/21/20  0019  08/20/20  0302   SODIUM mmol/L 140  --   --  143 144   < > 137   POTASSIUM mmol/L 4.3  --   --  4.8 4.5   < > 3.1*   CHLORIDE mmol/L 107  --   --  113* 113*   < > 101   CO2 mmol/L 22.0  --   --  22.0 23.0   < > 19.0*   BUN mg/dL 17  --   --  16 16   < > 21*   CREATININE mg/dL 1.00  --   --  1.53* 1.64*   < > 1.12*   GLUCOSE mg/dL 115*  --   --  127* 118*   < > 239*   CALCIUM mg/dL 8.7  --   --  8.5* 8.7   < > 9.6   ALT (SGPT) U/L 14  --   --  16  --   --  23   AST (SGOT) U/L 15  --   --  23  --   --  20   TROPONIN T ng/mL  --  <0.010 <0.010  --   --   --   --     < > = values in this interval not displayed.     Estimated Creatinine Clearance: 65.9 mL/min (by C-G formula based on SCr of 1 mg/dL).    Microbiology Results Abnormal     Procedure Component Value - Date/Time    Blood Culture - Blood, Arm, Left [[id number]] Collected:  08/20/20 0322    Lab Status:  Preliminary result Specimen:   Blood from Arm, Left Updated:  08/22/20 0345     Blood Culture No growth at 2 days    Blood Culture - Blood, Arm, Left [[id number]] Collected:  08/20/20 0322    Lab Status:  Preliminary result Specimen:  Blood from Arm, Left Updated:  08/22/20 0345     Blood Culture No growth at 2 days    Gastrointestinal Panel, PCR - Stool, Per Rectum [[id number]]  (Normal) Collected:  08/21/20 1045    Lab Status:  Final result Specimen:  Stool from Per Rectum Updated:  08/21/20 1426     Campylobacter Not Detected     Plesiomonas shigelloides Not Detected     Salmonella Not Detected     Vibrio Not Detected     Vibrio cholerae Not Detected     Yersinia enterocolitica Not Detected     Enteroaggregative E. coli (EAEC) Not Detected     Enteropathogenic E. coli (EPEC) Not Detected     Enterotoxigenic E. coli (ETEC) lt/st Not Detected     Shiga-like toxin-producing E. coli (STEC) stx1/stx2 Not Detected     E. coli O157 Not Detected     Shigella/Enteroinvasive E. coli (EIEC) Not Detected     Cryptosporidium Not Detected     Cyclospora cayetanensis Not Detected     Entamoeba histolytica Not Detected     Giardia lamblia Not Detected     Adenovirus F40/41 Not Detected     Astrovirus Not Detected     Norovirus GI/GII Not Detected     Rotavirus A Not Detected     Sapovirus (I, II, IV or V) Not Detected    Fecal Leukocytes - Stool, Per Rectum [[id number]]  (Normal) Collected:  08/21/20 1045    Lab Status:  Final result Specimen:  Stool from Per Rectum Updated:  08/21/20 1416     Fecal Leukocytes No WBC's Seen    Clostridium Difficile Toxin - Stool, Per Rectum [[id number]] Collected:  08/21/20 1045    Lab Status:  Final result Specimen:  Stool from Per Rectum Updated:  08/21/20 1154    Narrative:       The following orders were created for panel order Clostridium Difficile Toxin - Stool, Per Rectum.  Procedure                               Abnormality         Status                     ---------                               -----------          ------                     Clostridium Difficile To...[[id number]]  Normal              Final result                 Please view results for these tests on the individual orders.    Clostridium Difficile Toxin, PCR - Stool, Per Rectum [[id number]]  (Normal) Collected:  08/21/20 1045    Lab Status:  Final result Specimen:  Stool from Per Rectum Updated:  08/21/20 1154     C. Difficile Toxins by PCR Not Detected    Narrative:       Performance characteristics of test not established for patients <2 years of age.  Negative for Toxigenic C. Difficile          Imaging Results (Last 24 Hours)     ** No results found for the last 24 hours. **               I have reviewed the medications:  Scheduled Meds:    FLUoxetine 20 mg Oral Daily   insulin lispro 0-7 Units Subcutaneous TID AC   lactobacillus acidophilus 1 capsule Oral Daily   rosuvastatin 40 mg Oral Daily   tigecycline 50 mg Intravenous Q12H     Continuous Infusions:    Pharmacy Consult     sodium chloride 125 mL/hr Last Rate: 125 mL/hr (08/22/20 0323)     PRN Meds:.•  acetaminophen  •  ALPRAZolam  •  calcium carbonate EX  •  dextrose  •  dextrose  •  glucagon (human recombinant)  •  labetalol  •  magnesium sulfate **OR** magnesium sulfate **OR** magnesium sulfate  •  Morphine **AND** naloxone  •  ondansetron  •  Pharmacy Consult  •  potassium chloride **OR** potassium chloride **OR** potassium chloride  •  sodium chloride    Assessment/Plan   Assessment & Plan     Active Hospital Problems    Diagnosis  POA   • **Sepsis without acute organ dysfunction (CMS/HCC) [A41.9]  Yes   • Type 2 diabetes mellitus (CMS/HCC) [E11.9]  Yes   • History of melanoma [Z85.820]  Not Applicable   • Essential hypertension [I10]  Yes   • Diverticulosis [K57.90]  Yes   • Hypokalemia [E87.6]  Unknown   • HERO (acute kidney injury) (CMS/[facility]) [N17.9]  Unknown      Resolved Hospital Problems   No resolved problems to display.        Brief Hospital Course to date:  Bhargavi Mohr is a 54 y.o.  "female with history of anxiety, DL, HTN, diverticulosis, DMII, prior C diff colitis and melanoma, with recent laparoscopic hysterectomy and slapingoophorectomy (8/3/20 Dr Marmolejo), who just finished a course of keflex for redness and drainage at one of her laparoscopic incision sites, presents with chills, abdominal pain and loose dark stool.    Sepsis  - leukocytosis (WBC 21), lactic acidosis (lactate 3.9) and HERO, all secondary to Colitis    Colitis  - C diff PCR and GI panel negative  - loose stool continues but abdominal pain and leukocytosis have improved. WBC today 12, repeat CBC am.  - emesis x 1 this am, new finding, suspect may be secondary to new antibiotic (Tygacil)  - procalcitonin low (0.05), modestly elevated CRP (3.81) today  - repeat cbc    Acute Kidney injury  - suspect secondary to sepsis and volume depletion from diarrhea, in setting of home NSAID, ACEi and diuretic use.  - prehospitalization creatinine 0.73, with peak creatinine 1.53 yesterday. Improved to 1.00 today with IV fluid hydration and continued holding of home lisinopril and hctz.  - continue IV fluids, encouraged oral fluid intake, repeat bmp am    Renal lesion  - discussed with patient, will need dedicated renal scan (with contrast as possible) as an outpatient. Follow up with PCP next week.     Chest tightness  - patient says she felt some epigastric pain and a sensation of a \"band\" around her lower ribs yesterday evening. EKG personally reviewed for yesterday, no ST changes to my eye. Troponin x 2 negative. Patient says symptoms much better today, improved with tylenol and changing position in bed.    Lactic acidosis  - resolved    DMII  - a1c 6.5    HTN  - HCTZ and lisinopril held  - add prn labetolol    Anxiety    DVT Prophylaxis:  Mechanical, ambulate      Disposition: I expect the patient to be discharged home in 1-2 days.      CODE STATUS:   Code Status and Medical Interventions:   Ordered at: 08/20/20 0601     Level Of " Support Discussed With:    Patient     Code Status:    CPR     Medical Interventions (Level of Support Prior to Arrest):    Full         Electronically signed by Elieser Medrano MD, 20, 08:58.          Electronically signed by Elieser Medrano MD at 20 0914     Elieser Medrano MD at 20 1306              [facility] Medicine Services  PROGRESS NOTE    Patient Name: Bhargavi Mohr  : 1965  MRN: [id number]    Date of Admission: 2020  Primary Care Physician: Charu Jordan MD    Subjective   Subjective     CC:  Abdominal pain and diarrhea    HPI:  Still having loose stool, watery, and some abdominal cramping, but overall feels much better. Eating well, glad to be on a regular diet now.    Review of Systems  Gen- No fevers, chills  CV- No chest pain, palpitations  Resp- No cough, dyspnea  GI- + diarrhea and abdominal cramping as above        Objective   Objective     Vital Signs:   Temp:  [98.1 °F (36.7 °C)-98.6 °F (37 °C)] 98.1 °F (36.7 °C)  Heart Rate:  [76-89] 86  Resp:  [18] 18  BP: (143-164)/() 158/93        Physical Exam:  Constitutional -no acute distress, non toxic, in bed  HEENT-NCAT, mucous membranes moist  CV-RRR, S1 S2 normal, no m/r/g  Resp-CTAB, no wheezes, rhonchi or rales  Abd-soft, non-tender, non-distended, normo active bowel sounds  Ext-No lower extremity cyanosis, clubbing or edema bilaterally  Neuro-alert and oriented, speech clear, moves all extremities   Psych-normal affect   Skin- No rash on exposed UE or LE bilaterally    Results Reviewed:  Results from last 7 days   Lab Units 20  0407 20  1242 20  0302   WBC 10*3/mm3 11.01* 12.35* 21.32*   HEMOGLOBIN g/dL 11.0* 12.1 12.9   HEMATOCRIT % 35.0 37.5 39.0   PLATELETS 10*3/mm3 268 271 326     Results from last 7 days   Lab Units 20  0407 20  0019 20  0302   SODIUM mmol/L 143 144 141   < > 137   POTASSIUM mmol/L 4.8 4.5 4.4   < > 3.1*    CHLORIDE mmol/L 113* 113* 111*   < > 101   CO2 mmol/L 22.0 23.0 23.0   < > 19.0*   BUN mg/dL 16 16 17   < > 21*   CREATININE mg/dL 1.53* 1.64* 1.38*   < > 1.12*   GLUCOSE mg/dL 127* 118* 148*   < > 239*   CALCIUM mg/dL 8.5* 8.7 8.5*   < > 9.6   ALT (SGPT) U/L 16  --   --   --  23   AST (SGOT) U/L 23  --   --   --  20    < > = values in this interval not displayed.     Estimated Creatinine Clearance: 42.5 mL/min (A) (by C-G formula based on SCr of 1.53 mg/dL (H)).    Microbiology Results Abnormal     Procedure Component Value - Date/Time    Clostridium Difficile Toxin - Stool, Per Rectum [[id number]] Collected:  08/21/20 1045    Lab Status:  Final result Specimen:  Stool from Per Rectum Updated:  08/21/20 1154    Narrative:       The following orders were created for panel order Clostridium Difficile Toxin - Stool, Per Rectum.  Procedure                               Abnormality         Status                     ---------                               -----------         ------                     Clostridium Difficile To...[[id number]]  Normal              Final result                 Please view results for these tests on the individual orders.    Clostridium Difficile Toxin, PCR - Stool, Per Rectum [[id number]]  (Normal) Collected:  08/21/20 1045    Lab Status:  Final result Specimen:  Stool from Per Rectum Updated:  08/21/20 1154     C. Difficile Toxins by PCR Not Detected    Narrative:       Performance characteristics of test not established for patients <2 years of age.  Negative for Toxigenic C. Difficile    Blood Culture - Blood, Arm, Left [[id number]] Collected:  08/20/20 0322    Lab Status:  Preliminary result Specimen:  Blood from Arm, Left Updated:  08/21/20 7695     Blood Culture No growth at 24 hours    Blood Culture - Blood, Arm, Left [[id number]] Collected:  08/20/20 0322    Lab Status:  Preliminary result Specimen:  Blood from Arm, Left Updated:  08/21/20 0345     Blood Culture No growth at 24 hours           Imaging Results (Last 24 Hours)     ** No results found for the last 24 hours. **               I have reviewed the medications:  Scheduled Meds:    FLUoxetine 20 mg Oral Daily   insulin lispro 0-7 Units Subcutaneous TID AC   rosuvastatin 40 mg Oral Daily   tigecycline 50 mg Intravenous Q12H   vancomycin 250 mg Oral Q6H     Continuous Infusions:    Pharmacy Consult     sodium chloride 125 mL/hr Last Rate: 125 mL/hr (08/21/20 1042)     PRN Meds:.•  acetaminophen  •  ALPRAZolam  •  calcium carbonate EX  •  dextrose  •  dextrose  •  glucagon (human recombinant)  •  magnesium sulfate **OR** magnesium sulfate **OR** magnesium sulfate  •  Morphine **AND** naloxone  •  Pharmacy Consult  •  potassium chloride **OR** potassium chloride **OR** potassium chloride  •  sodium chloride    Assessment/Plan   Assessment & Plan     Active Hospital Problems    Diagnosis  POA   • **Sepsis without acute organ dysfunction (CMS/HCC) [A41.9]  Yes   • Type 2 diabetes mellitus (CMS/HCC) [E11.9]  Yes   • History of melanoma [Z85.820]  Not Applicable   • Essential hypertension [I10]  Yes   • Diverticulosis [K57.90]  Yes   • Hypokalemia [E87.6]  Unknown   • HERO (acute kidney injury) (CMS/HCC) [N17.9]  Unknown      Resolved Hospital Problems   No resolved problems to display.        Brief Hospital Course to date:  Bhargavi Mohr is a 54 y.o. female with history of anxiety, DL, HTN, diverticulosis, DMII, prior C diff colitis and melanoma, with recent laparoscopic hysterectomy and slapingoophorectomy (8/3/20 Dr Marmolejo), who just finished a course of keflex for redness and drainage at one of her laparoscopic incision sites, presents with chills, abdominal pain and loose dark stool.    Sepsis  - reviewed CT imaging with Gyn Onc -- no changes seen that would specifically relate to recent surgery  - given recent antibiotic use and history of C diff, suspected recurrent C diff, however PCR negative.  - dark color of stool may be secondary to  recent NSAID use and possible gastritis/PUD. However, will defer PPI at this time due to history of clostridial infection  - clinically improving (less abdominal pain, leukocytosis better), but still some diarrhea and abdominal cramping. Consider GI panel (deferred at admission to minimize resource usage as most likely diagnosis at that time appeared to be C diff colitis).    Renal insufficiency  - creatinine 1.53 today, may be some intravascular volume depletion from loose stool  - holding lisinopril and hctz, avoid nephrotoxins (patient was taking some NSAIDs at home)  - continue IV fluids, encouraged po fluid intake  - repeat bmp am    Renal lesion  - discussed with patient, will need dedicated renal scan (with contrast as possible) as an outpatient. Follow up with PCP next week.    Lactic acidosis  - resolved    DMII  - a1c 6.5    HTN  - HCTZ and lisinopril held  - add prn labetolol    Anxiety    DVT Prophylaxis:  Mechanical, ambulate      Disposition: I expect the patient to be discharged tbd      CODE STATUS:   Code Status and Medical Interventions:   Ordered at: 08/20/20 0601     Level Of Support Discussed With:    Patient     Code Status:    CPR     Medical Interventions (Level of Support Prior to Arrest):    Full         Electronically signed by Elieser Medrano MD, 08/21/20, 13:06.          Electronically signed by Elieser Medrano MD at 08/21/20 1324          Consult Notes (last 72 hours) (Notes from 08/21/20 0946 through 08/24/20 0946)      Yvonne Caldwell MD at 08/21/20 1020      Consult Orders    1. Inpatient Infectious Diseases Consult [[id number]] ordered by Elieser Medrano MD at 08/21/20 0714                INFECTIOUS DISEASE CONSULT/INITIAL HOSPITAL VISIT    Bhargavi Mohr  1965  [id number]    Date of Consult: 8/21/2020    Admission Date: 8/20/2020    Requesting Provider: Dr. Elieser Medrano  Evaluating Physician: Dr. Yvonne Caldwell    Reason for Consultation: Sepsis    Chief complaint:  Abdominal pain    History of present illness:    Ms. Bhargavi Mohr is a 54 y.o. female who is being evaluated for sepsis.  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  She presented to the ED on 8/20 with complaints of abdominal pain.  Patient reportedly underwent a laparoscopic hysterectomy with left oophorectomy secondary to dysplasia of the cervix and high-grade EDMUND-2 on 8/3/2020.  Approximately 1 week ago she noted that 1 of the incision sites began draining and she presented to the [facility] he was prescribed Keflex.  She followed up with Dr. Guevara's office who removed the stitch and then packed the incision with gauze.  Approximately 4 days later she had another site that started to drain.  Patient states she removed the glue herself and allow the site to drain and stated that the site began to improve.  Approximately 3 to 4 days ago she developed severe back pain along with abdominal cramping that was progressively worsening over the past 24 hours prior to arrival.  She had self-limited  Nausea and 1 episode of  Vomiting. Over the last 24 hours she has had more frequent stools that are foul-smelling and dark in appearance.      Since arrival she has had T-max of 99.8 and has been hemodynamically stable.  Initial labs showed a neutrophilic leukocytosis (WBC 21.32), lactic acidosis (3.9), elevated creatinine 1.12.  UA showed no bacteria or pyuria.  Creatinine of 1.53  Blood cultures  on 8/20  are  no growth to date. CT of the abdomen and pelvis which showed a partially calcified 17 mm left renal lesion that could represent a complex cyst, numerous colonic diverticuli, and inflammatory change in the left side of the pelvis could possibly represent focal colitis or diverticulitis C. difficile secondary to antibiotic usage.  C. difficile test was negative    She has a history of  diverticulitis 2010 complicated by cdiff x 2 in 2010. At no time did her pain resemble her current symptoms. Exposures: she has horses, dogs, cats at home. She denies exposure to reptiles, birds or wild animals She has had no exposure to wild animals. Her SO has not been ill She ate take out food from a [employer] restaurant 8 days ago, 4 days prior to the onset of symptoms. She denies ingestion of raw eggs, unpasteurized dairy products, etc    She is currently receiving IV Zosyn, Flagyl andoral vancomycin.  ID has been consulted for further evaluation and treatment as well as antimicrobial therapy management.        Past Medical History:   Diagnosis Date   • Anxiety    • Diabetes (CMS/HCC)     diet and exercise controlled    • Diverticulitis    • History of right salpingo-oophorectomy 12/2018   • Hypertension    • Melanoma (CMS/HCC)    • PONV (postoperative nausea and vomiting)    • Positive TB test    • Wears glasses        Past Surgical History:   Procedure Laterality Date   • APPENDECTOMY     • CERVICAL CONIZATION     • COLONOSCOPY  2019 DEC    • ENDOSCOPY     • OOPHORECTOMY Right    • SKIN CANCER EXCISION      melanoma   • TONSILLECTOMY AND ADENOIDECTOMY     • TOTAL LAPAROSCOPIC HYSTERECTOMY SALPINGO OOPHORECTOMY Left 8/3/2020    Procedure: TOTAL LAPAROSCOPIC HYSTERECTOMY LEFT SALPINGOOPHORECTOMY WITH DAVINCI ROBOT;  Surgeon: eN Marmolejo MD;  Location: [facility];  Service: [facility];  Laterality: Left;       Family History   Problem Relation Age of Onset   • Heart disease Mother    • Colon cancer Father    • Breast cancer Neg Hx    • Ovarian cancer Neg Hx        Social History     Socioeconomic History   • Marital status:      Spouse name: Not on file   • Number of children: 2   • Years of education: Not on file   • Highest education level: Not on file   Tobacco Use   • Smoking status: Current Some Day Smoker     Packs/day: 1.00     Years: 20.00     Pack years: 20.00     Types: Cigarettes     Last  attempt to quit: 2006     Years since quittin.0   • Smokeless tobacco: Never Used   Substance and Sexual Activity   • Alcohol use: Never     Frequency: Never     Drinks per session: 1 or 2   • Drug use: Never   • Sexual activity: Defer       Allergies   Allergen Reactions   • Codeine Dizziness and Delirium     Feels drunk   • Percocet [Oxycodone-Acetaminophen] Itching         Medication:    Current Facility-Administered Medications:   •  acetaminophen (TYLENOL) tablet 650 mg, 650 mg, Oral, Q6H PRN, Elieser Medrano MD, 650 mg at 20 1828  •  ALPRAZolam (XANAX) tablet 0.25 mg, 0.25 mg, Oral, TID PRN, Noel, Anne-Marie, APRN  •  calcium carbonate EX (TUMS EX) chewable tablet 750 mg, 750 mg, Oral, TID PRN, Elieser Medrano MD, 750 mg at 20 1838  •  dextrose (D50W) 25 g/ 50mL Intravenous Solution 25 g, 25 g, Intravenous, Q15 Min PRN, Noel, Anne-Marie, APRN  •  dextrose (GLUTOSE) oral gel 15 g, 15 g, Oral, Q15 Min PRN, Noel, Anne-Marie, APRN  •  FLUoxetine (PROzac) capsule 20 mg, 20 mg, Oral, Daily, Noel, Anne-Marie, APRN, 20 mg at 20 1151  •  glucagon (human recombinant) (GLUCAGEN DIAGNOSTIC) injection 1 mg, 1 mg, Subcutaneous, Q15 Min PRN, Noel, Anne-Marie, APRN  •  insulin lispro (humaLOG) injection 0-7 Units, 0-7 Units, Subcutaneous, TID AC, Noel, Anne-Marie, APRN  •  Magnesium Sulfate 2 gram Bolus, followed by 8 gram infusion (total Mg dose 10 grams)- Mg less than or equal to 1mg/dL, 2 g, Intravenous, PRN **OR** Magnesium Sulfate 2 gram / 50mL Infusion (GIVE X 3 BAGS TO EQUAL 6GM TOTAL DOSE) - Mg 1.1 - 1.5 mg/dl, 2 g, Intravenous, PRN **OR** Magnesium Sulfate 4 gram infusion- Mg 1.6-1.9 mg/dL, 4 g, Intravenous, PRN, Mic, Barbara J, DO  •  metroNIDAZOLE (FLAGYL) 500 mg/100mL IVPB, 500 mg, Intravenous, Q8H, Barbara Haile J, DO, Last Rate: 0 mL/hr at 20 0800, 500 mg at 20 0640  •  morphine injection 1 mg, 1 mg, Intravenous, Q4H PRN **AND** naloxone (NARCAN) injection 0.4 mg, 0.4 mg,  Intravenous, Q5 Min PRN, Noel, Anne-Marie, APRN  •  Pharmacy Consult, , Does not apply, Continuous PRN, Elieser Medrano MD  •  piperacillin-tazobactam (ZOSYN) 3.375 g in iso-osmotic dextrose 50 ml (premix), 3.375 g, Intravenous, Q8H, Onel, Anne-Marie, APRN, 3.375 g at 08/21/20 0426  •  potassium chloride (MICRO-K) CR capsule 40 mEq, 40 mEq, Oral, PRN, 40 mEq at 08/20/20 1828 **OR** potassium chloride (KLOR-CON) packet 40 mEq, 40 mEq, Oral, PRN **OR** potassium chloride 10 mEq in 100 mL IVPB, 10 mEq, Intravenous, Q1H PRN, Barbara Haile DO  •  rosuvastatin (CRESTOR) tablet 40 mg, 40 mg, Oral, Daily, Nole, Anne-Marie, APRN, 40 mg at 08/20/20 1151  •  sodium chloride 0.9 % flush 10 mL, 10 mL, Intravenous, PRN, Malgorzata Torres MD  •  sodium chloride 0.9 % infusion, 125 mL/hr, Intravenous, Continuous, Elieser Medrano MD  •  vancomycin oral solution reconstituted 250 mg, 250 mg, Oral, Q6H, Elieser Medrano MD, 250 mg at 08/21/20 0427    Antibiotics:  Anti-Infectives (From admission, onward)    Ordered     Dose/Rate Route Frequency Start Stop    08/20/20 1117  piperacillin-tazobactam (ZOSYN) 3.375 g in iso-osmotic dextrose 50 ml (premix)  Review   Ordering Provider:  Taran Cohena, APRN    3.375 g  over 4 Hours Intravenous Every 8 Hours 08/20/20 1300 08/25/20 1259    08/20/20 0859  vancomycin oral solution reconstituted 250 mg  Review   Ordering Provider:  Elieser Medrano MD    250 mg Oral Every 6 Hours Scheduled 08/20/20 0859 09/03/20 0959    08/20/20 0531  metroNIDAZOLE (FLAGYL) 500 mg/100mL IVPB  Review   Ordering Provider:  Barbara Haile DO    500 mg  over 60 Minutes Intravenous Every 8 Hours 08/20/20 0600 08/25/20 0559    08/20/20 0342  piperacillin-tazobactam (ZOSYN) 3.375 g in iso-osmotic dextrose 50 ml (premix)     Ordering Provider:  Malgorzata Torres MD    3.375 g  over 30 Minutes Intravenous Once 08/20/20 0344 08/20/20 0550            Review of Systems:  Constitutional--positive for appetite  change.  HEENT--negative.  CV--negative  Resp--negative  GI-positive for foul-smelling diarrhea, nausea, vomiting and abdominal pain.  --negative.  Lymph-negative.   Heme-negative.  MS--positive for back pain.  Neuro--negative.  Skin--positive for surgical incision and drainage      Physical Exam:   Vital Signs  Temp (24hrs), Av.5 °F (36.9 °C), Min:98.1 °F (36.7 °C), Max:98.8 °F (37.1 °C)    Temp  Min: 98.1 °F (36.7 °C)  Max: 98.8 °F (37.1 °C)  BP  Min: 124/81  Max: 164/88  Pulse  Min: 76  Max: 93  Resp  Min: 18  Max: 18  SpO2  Min: 92 %  Max: 98 %    GENERAL: Awake and alert, in no acute distress.   HEENT: Normocephalic, atraumatic.  PERRL. EOMI. No conjunctival injection. No icterus. Oropharynx clear without evidence of thrush or exudate. No evidence of peridontal disease.    NECK: Supple without nuchal rigidity. No mass.  LYMPH: No cervical, axillary lymphadenopathy.  HEART: S1/S2 noted with no audible murmur, rubs or gallops noted.   LUNGS: CTA bilaterally with no wheezing or rhonchi noted.  Nonlabored respirations.  ABDOMEN: Soft, nontender, nondistended.  Hyperactive bowel sounds x4 quadrants.  No rebound or guarding with no appreciable HSM noted.    EXT:  No cyanosis, clubbing or edema. No cord.  :   Without Khan catheter.  MSK: FROM.    SKIN: Warm and dry without rashes or lesions noted.  She has 4 surgical incisions to the abdomen which are well-healed with well approximated edges and only mild erythema.  NEURO: Oriented to PPT. No focal deficits on motor/sensory exam at arms/legs.  PSYCHIATRIC: Normal insight and judgement. Cooperative with PE    Laboratory Data    Results from last 7 days   Lab Units 20  0407 20  1242 20  0302   WBC 10*3/mm3 11.01* 12.35* 21.32*   HEMOGLOBIN g/dL 11.0* 12.1 12.9   HEMATOCRIT % 35.0 37.5 39.0   PLATELETS 10*3/mm3 268 271 326     Results from last 7 days   Lab Units 20  0407   SODIUM mmol/L 143   POTASSIUM mmol/L 4.8   CHLORIDE mmol/L 113*    CO2 mmol/L 22.0   BUN mg/dL 16   CREATININE mg/dL 1.53*   GLUCOSE mg/dL 127*   CALCIUM mg/dL 8.5*     Results from last 7 days   Lab Units 08/21/20  0407   ALK PHOS U/L 66   BILIRUBIN mg/dL 0.4   ALT (SGPT) U/L 16   AST (SGOT) U/L 23             Results from last 7 days   Lab Units 08/20/20  0845   LACTATE mmol/L 1.4             Estimated Creatinine Clearance: 42.5 mL/min (A) (by C-G formula based on SCr of 1.53 mg/dL (H)).      Microbiology:  Microbiology Results (last 10 days)     Procedure Component Value - Date/Time    Blood Culture - Blood, Arm, Left [[id number]] Collected:  08/20/20 0322    Lab Status:  Preliminary result Specimen:  Blood from Arm, Left Updated:  08/21/20 0345     Blood Culture No growth at 24 hours    Blood Culture - Blood, Arm, Left [[id number]] Collected:  08/20/20 0322    Lab Status:  Preliminary result Specimen:  Blood from Arm, Left Updated:  08/21/20 0345     Blood Culture No growth at 24 hours              Radiology:  Imaging Results (Last 72 Hours)     Procedure Component Value Units Date/Time    CT Abdomen Pelvis Without Contrast [[id number]] Collected:  08/20/20 0327     Updated:  08/20/20 0329    Narrative:       CT Abdomen Pelvis WO    INDICATION:   Abdominal pain all over for 3 days    TECHNIQUE:   CT of the abdomen and pelvis without IV contrast. Coronal and sagittal reconstructions were obtained.  Radiation dose reduction techniques included automated exposure control or exposure modulation based on body size. Count of known CT and cardiac nuc  med studies performed in previous 12 months: 0.     COMPARISON:   None available.    FINDINGS:  Abdomen: Lung bases are clear the liver, gallbladder, spleen, pancreas, adrenal glands and right kidney normal.    The left kidney has an oval lesion in the midportion of the kidney. This is just anterior to the renal parenchyma. It measures about 17 mm maximum dimension and is partially calcified. This is likely a complex cyst. The aneurysm is  less likely.    The aorta is normal in size. There is no adenopathy. The bowel shows numerous left colon diverticuli. There seems be some mild inflammatory stranding in the left side of the pelvis. I cannot clearly identify any site of diverticulitis there is no colon  wall thickening identified.    Pelvis: Bladder is normal. The uterus is been removed. There are no adnexal masses. Bones are unremarkable.      Impression:       Partially calcified 17 mm left renal lesion. This could represent a complex cyst, solid mass within the aneurysm. Follow-up as an outpatient with multiphase renal CT scan is recommended.    Numerous colonic diverticuli    This seems be some subtle inflammatory change in the left side of the pelvis. This is best appreciated on image 72. This could possibly represent focal colitis or diverticulitis.          Signer Name: Javier Lima MD   Signed: 8/20/2020 3:27 AM   Workstation Name: [email or web address]    [facility]            Impression:   10. Sepsis presenting with neutrophilic leukocytosis (21.32), lactic acidosis (3.9), worsening acute kidney injury, surgical incision drainage and diarrhea  11. Recent postoperative surgical wound infection status post laparoscopic hysterectomy and left oophorectomy on 8/3/2020-resolved with outpatient Keflex therapy.  12. C. difficile colitis  13. Neutrophilic leukocytosis-improving  14. Acute kidney injury-worsening  15. Lactic acidosis-improving  16. Diverticulosis  17. Diabetes mellitus type 2  18. Hypertension    PLAN/RECOMMENDATIONS:   Thank you for asking us to see Bhargavi Mohr, I recommend the following  -- stop  oral vancomycin and IV Flagyl since C. difficile negative  -- Change IV Zosyn to Tygacil for diverticulosis/diverticulitis for now  -- GI panel, stool culture, fecal leukocytes  -- Continue to follow cultures and sensitivity reports and adjust antibiotics accordingly  -- Continue to follow labs while in hospital to include  CBC, CMP, ESR, CRP, procalcitonin and lactic acid.  -- probiotic       Dr. Yvonne Caldwell has obtained the history, performed the physical exam and formulated the above treatment plan.     Ranjit Benson, APRN  8/21/2020  10:21                    Electronically signed by Yvonne Caldwell MD at 08/21/20 8312

## 2020-08-24 NOTE — PLAN OF CARE
Problem: Patient Care Overview  Goal: Plan of Care Review  Outcome: Ongoing (interventions implemented as appropriate)  Flowsheets  Taken 8/22/2020 0505 by Milligan, Bryanna, RN  Progress: improving  Taken 8/24/2020 0815 by Zelda Pierson RN  Plan of Care Reviewed With: patient  Taken 8/24/2020 1720 by Zelda Pierson, RN  Outcome Summary: Patient A&0 x 4.  Complaints of abdominal cramping but reports it has improved.  Diet advanced as tolerated.  Two small semi-loose stools noted.  No new concerns voiced.

## 2020-08-24 NOTE — PROGRESS NOTES
[facility] Medicine Services  PROGRESS NOTE    Patient Name: Bhargavi Mohr  : 1965  MRN: [id number]    Date of Admission: 2020  Primary Care Physician: Charu Jordan MD    Subjective   Subjective     CC:  Abdominal pain and diarrhea    HPI:  Overall is improved, but diarrhea has picked up a little today.  4 episodes since noon.  Very watery.  Mild crampy abdominal pain.    Review of Systems  Gen- No fevers, chills  CV- No chest pain, palpitations  Resp- No cough, dyspnea  GI- + nausea and diarrhea      Objective   Objective     Vital Signs:   Temp:  [97.2 °F (36.2 °C)-98.6 °F (37 °C)] 98.5 °F (36.9 °C)  Heart Rate:  [65-85] 68  Resp:  [16-18] 16  BP: (148-168)/(82-95) 160/94        Physical Exam:  Constitutional: No acute distress, awake, alert, amublated from the bathroom and overall looks good  HENT: NCAT, mucous membranes moist  Respiratory: Clear to auscultation bilaterally, respiratory effort normal   Cardiovascular: RRR, no murmurs, rubs, or gallops  Gastrointestinal: Positive bowel sounds, soft, lap sites looks good.  Mild RLQ and umbilical tenderness, no r/g  Musculoskeletal: No bilateral ankle edema  Psychiatric: Appropriate affect, cooperative  Neurologic: Oriented x 3, no focal deficits  Skin: No rashes        Results Reviewed:  Results from last 7 days   Lab Units 20  0622 20  0649 20  0407   WBC 10*3/mm3 10.11 12.68* 11.01*   HEMOGLOBIN g/dL 10.7* 11.1* 11.0*   HEMATOCRIT % 32.9* 34.6 35.0   PLATELETS 10*3/mm3 284 267 268   PROCALCITONIN ng/mL  --  0.05  --      Results from last 7 days   Lab Units 20  0622 20  0649 20  0029 20  1753 20  0407   SODIUM mmol/L 140 140  --   --  143   POTASSIUM mmol/L 3.9 4.3  --   --  4.8   CHLORIDE mmol/L 108* 107  --   --  113*   CO2 mmol/L 21.0* 22.0  --   --  22.0   BUN mg/dL 19 17  --   --  16   CREATININE mg/dL 0.78 1.00  --   --  1.53*   GLUCOSE mg/dL 93 115*  --   --  127*    CALCIUM mg/dL 8.3* 8.7  --   --  8.5*   ALT (SGPT) U/L 14 14  --   --  16   AST (SGOT) U/L 14 15  --   --  23   TROPONIN T ng/mL  --   --  <0.010 <0.010  --      Estimated Creatinine Clearance: 81.7 mL/min (by C-G formula based on SCr of 0.78 mg/dL).    Microbiology Results Abnormal     Procedure Component Value - Date/Time    Blood Culture - Blood, Arm, Left [[id number]] Collected:  08/20/20 0322    Lab Status:  Preliminary result Specimen:  Blood from Arm, Left Updated:  08/24/20 0346     Blood Culture No growth at 4 days    Blood Culture - Blood, Arm, Left [[id number]] Collected:  08/20/20 0322    Lab Status:  Preliminary result Specimen:  Blood from Arm, Left Updated:  08/24/20 0346     Blood Culture No growth at 4 days    Stool Culture (Reference Lab) - Stool, Per Rectum [[id number]] Collected:  08/21/20 1312    Lab Status:  Preliminary result Specimen:  Stool from Per Rectum Updated:  08/23/20 2106     Salmonella/Shigella Screen Final report     Result 1 Comment     Comment: No Salmonella or Shigella recovered.        E coli, Shiga toxin Assay Negative    Narrative:       Performed at:  [address] [place] [address]  [id number]  : Leo Issa PhD, Phone:  [phone]    Gastrointestinal Panel, PCR - Stool, Per Rectum [[id number]]  (Normal) Collected:  08/21/20 1045    Lab Status:  Final result Specimen:  Stool from Per Rectum Updated:  08/21/20 1426     Campylobacter Not Detected     Plesiomonas shigelloides Not Detected     Salmonella Not Detected     Vibrio Not Detected     Vibrio cholerae Not Detected     Yersinia enterocolitica Not Detected     Enteroaggregative E. coli (EAEC) Not Detected     Enteropathogenic E. coli (EPEC) Not Detected     Enterotoxigenic E. coli (ETEC) lt/st Not Detected     Shiga-like toxin-producing E. coli (STEC) stx1/stx2 Not Detected     E. coli O157 Not Detected     Shigella/Enteroinvasive E. coli (EIEC) Not Detected     Cryptosporidium Not  Detected     Cyclospora cayetanensis Not Detected     Entamoeba histolytica Not Detected     Giardia lamblia Not Detected     Adenovirus F40/41 Not Detected     Astrovirus Not Detected     Norovirus GI/GII Not Detected     Rotavirus A Not Detected     Sapovirus (I, II, IV or V) Not Detected    Fecal Leukocytes - Stool, Per Rectum [[id number]]  (Normal) Collected:  08/21/20 1045    Lab Status:  Final result Specimen:  Stool from Per Rectum Updated:  08/21/20 1416     Fecal Leukocytes No WBC's Seen    Clostridium Difficile Toxin - Stool, Per Rectum [[id number]] Collected:  08/21/20 1045    Lab Status:  Final result Specimen:  Stool from Per Rectum Updated:  08/21/20 1154    Narrative:       The following orders were created for panel order Clostridium Difficile Toxin - Stool, Per Rectum.  Procedure                               Abnormality         Status                     ---------                               -----------         ------                     Clostridium Difficile To...[[id number]]  Normal              Final result                 Please view results for these tests on the individual orders.    Clostridium Difficile Toxin, PCR - Stool, Per Rectum [[id number]]  (Normal) Collected:  08/21/20 1045    Lab Status:  Final result Specimen:  Stool from Per Rectum Updated:  08/21/20 1154     C. Difficile Toxins by PCR Not Detected    Narrative:       Performance characteristics of test not established for patients <2 years of age.  Negative for Toxigenic C. Difficile          Imaging Results (Last 24 Hours)     ** No results found for the last 24 hours. **               I have reviewed the medications:  Scheduled Meds:    amLODIPine 5 mg Oral Q24H   enoxaparin 40 mg Subcutaneous Q24H   FLUoxetine 20 mg Oral Daily   hydrocortisone  Topical Q12H   insulin lispro 0-7 Units Subcutaneous TID AC   lactobacillus acidophilus 1 capsule Oral Daily   rosuvastatin 40 mg Oral Daily     Continuous Infusions:     PRN Meds:.•   "acetaminophen  •  ALPRAZolam  •  calcium carbonate EX  •  dextrose  •  dextrose  •  glucagon (human recombinant)  •  labetalol  •  magnesium sulfate **OR** magnesium sulfate **OR** magnesium sulfate  •  Morphine **AND** naloxone  •  ondansetron  •  potassium chloride **OR** potassium chloride **OR** potassium chloride  •  sodium chloride    Assessment/Plan   Assessment & Plan     Active Hospital Problems    Diagnosis  POA   • **Sepsis without acute organ dysfunction (CMS/HCC) [A41.9]  Yes   • Type 2 diabetes mellitus (CMS/HCC) [E11.9]  Yes   • History of melanoma [Z85.820]  Not Applicable   • Essential hypertension [I10]  Yes   • Diverticulosis [K57.90]  Yes   • Hypokalemia [E87.6]  Unknown   • HERO (acute kidney injury) (CMS/HCC) [N17.9]  Unknown      Resolved Hospital Problems   No resolved problems to display.        Brief Hospital Course to date:  Bhargavi Mohr is a 55 y.o. female with history of anxiety, DL, HTN, diverticulosis, DMII, prior C diff colitis and melanoma, with recent laparoscopic hysterectomy and slapingoophorectomy (8/3/20 Dr Marmolejo), who just finished a course of keflex for redness and drainage at one of her laparoscopic incision sites, presents with chills, abdominal pain and loose dark stool.    Sepsis  - leukocytosis (WBC 21), lactic acidosis (lactate 3.9) and HERO, all secondary to Colitis  - clinically resolved    Colitis  - C diff PCR and GI panel negative  - all abx stopped 8/23  - WBC normalized, GI panel negative  - will hold on repeat scan today, consider tomorrow if any worse.    Acute Kidney injury  - suspect secondary to sepsis and volume depletion from diarrhea, in setting of home NSAID, ACEi and diuretic use.  - resolved    Renal lesion  - discussed with patient, will need dedicated renal scan (with contrast as possible) as an outpatient. Follow up with PCP next week.     Chest tightness  - patient says she felt some epigastric pain and a sensation of a \"band\" around her lower " ribs Friday (8/21) evening. EKG without ST changes. Troponin x 2 negative. Patient says symptoms much better after taking tylenol and changing position in bed.    Lactic acidosis  - resolved    DMII  - a1c 6.5  - continue current insulin    HTN  - HCTZ and lisinopril held, maybe resume tomorrow  - add prn labetolol    Anxiety    Overall is improved.  Plan home 1-2 days as long as no set backs.    DVT Prophylaxis:  Mechanical, ambulate, lovenox      Disposition: I expect the patient to be discharged home in 1-2 days.      CODE STATUS:   Code Status and Medical Interventions:   Ordered at: 08/20/20 0601     Level Of Support Discussed With:    Patient     Code Status:    CPR     Medical Interventions (Level of Support Prior to Arrest):    Full       Electronically signed by Clarence Skaggs MD, 08/24/20, 17:28.

## 2020-08-24 NOTE — PROGRESS NOTES
[facility] Progress Note    Admission Date: 8/20/2020    Bhargavi Mohr  1965  [id number]    Date: 8/24/2020    Antibiotics:  Anti-Infectives (From admission, onward)    Ordered     Dose/Rate Route Frequency Start Stop    08/20/20 0342  piperacillin-tazobactam (ZOSYN) 3.375 g in iso-osmotic dextrose 50 ml (premix)     Ordering Provider:  Malgorzata Torres MD    3.375 g  over 30 Minutes Intravenous Once 08/20/20 0344 08/20/20 0550             Chief complaint: Abdominal pain     History of present illness:    Ms. Bhargavi Mohr is a 54 y.o. female who is being evaluated for sepsis.  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  She presented to the ED on 8/20 with complaints of abdominal pain.  Patient reportedly underwent a laparoscopic hysterectomy with left oophorectomy secondary to dysplasia of the cervix and high-grade EDMUND-2 on 8/3/2020.  Approximately 1 week ago she noted that 1 of the incision sites began draining and she presented to the [facility] he was prescribed Keflex.  She followed up with Dr. Guevara's office who removed the stitch and then packed the incision with gauze.  Approximately 4 days later she had another site that started to drain.  Patient states she removed the glue herself and allow the site to drain and stated that the site began to improve.  Approximately 3 to 4 days ago she developed severe back pain along with abdominal cramping that was progressively worsening over the past 24 hours prior to arrival.  She had self-limited  Nausea and 1 episode of  Vomiting. Over the last 24 hours she has had more frequent stools that are foul-smelling and dark in appearance.       Since arrival she has had T-max of 99.8 and has been hemodynamically stable.  Initial labs showed a neutrophilic leukocytosis (WBC 21.32), lactic acidosis (3.9), elevated creatinine 1.12.  UA  showed no bacteria or pyuria.  Creatinine of 1.53  Blood cultures  on 8/20  are  no growth to date. CT of the abdomen and pelvis which showed a partially calcified 17 mm left renal lesion that could represent a complex cyst, numerous colonic diverticuli, and inflammatory change in the left side of the pelvis could possibly represent focal colitis or diverticulitis C. difficile secondary to antibiotic usage.  C. difficile test was negative     She has a history of diverticulitis 2010 complicated by cdiff x 2 in 2010. At no time did her pain resemble her current symptoms. Exposures: she has horses, dogs, cats at home. She denies exposure to reptiles, birds or wild animals She has had no exposure to wild animals. Her SO has not been ill She ate take out food from a [employer] restaurant 8 days ago, 4 days prior to the onset of symptoms. She denies ingestion of raw eggs, unpasteurized dairy products, etc     She is currently receiving IV Zosyn, Flagyl andoral vancomycin.  ID has been consulted for further evaluation and treatment as well as antimicrobial therapy management.    8/22  Changed to tygacil yesterday  Afebrile, abdominal cramping improved but liquid stools persist - 5 last pm  She vomited once this am per Dr Medrano around the time of her tygacil dose  Fecal leukocytes and GI panel negative  8/23 afebrile, ongoing diaarrhea  Very nauseated today  Stool culture unremarkable  8/24 afeebrile of antibiotics. Despite increased diarrhea she is feeling MUCH better  Hoping to be discharged in am CRP remains normal           Review of Systems:  Constitutional--positive for appetite change.  HEENT--negative.  CV--negative  Resp--negative  GI-positive for foul-smelling diarrhea; nausea, vomiting and abdominal pain all improved.  --negative.  Lymph-negative.   Heme-negative.  MS--positive for back pain.  Neuro--negative.  Skin--positive for surgical incision and drainage        PE:  Vital Signs  Temp  Min: 97.2 °F (36.2  °C)  Max: 98.6 °F (37 °C)  BP  Min: 148/82  Max: 168/95  Pulse  Min: 65  Max: 85  Resp  Min: 16  Max: 18  SpO2  Min: 97 %  Max: 97 %  GENERAL: Awake and alert, in no acute distress.   HEENT: Normocephalic, atraumatic.  PERRL. EOMI. No conjunctival injection. No icterus. Oropharynx clear without evidence of thrush or exudate. No evidence of peridontal disease.    NECK: Supple without nuchal rigidity. No mass.  LYMPH: No cervical, axillary lymphadenopathy.  HEART: S1/S2 noted with no audible murmur, rubs or gallops noted.   LUNGS: CTA bilaterally with no wheezing or rhonchi noted.  Nonlabored respirations.  ABDOMEN: Soft, nontender, nondistended.  Hyperactive bowel sounds x4 quadrants.  No rebound or guarding with no appreciable HSM noted.    EXT:  No cyanosis, clubbing or edema. No cord.  :   Without Khan catheter.  MSK: FROM.    SKIN: Warm and dry without rashes or lesions noted.  She has 4 surgical incisions to the abdomen which are well-healed with well approximated edges and only mild erythema.  NEURO: Oriented to PPT. No focal deficits on motor/sensory exam at arms/legs.  PSYCHIATRIC: Normal insight and judgement. Cooperative with PE     Laboratory Data    Results from last 7 days   Lab Units 08/23/20  0622 08/22/20  0649 08/21/20  0407   WBC 10*3/mm3 10.11 12.68* 11.01*   HEMOGLOBIN g/dL 10.7* 11.1* 11.0*   HEMATOCRIT % 32.9* 34.6 35.0   PLATELETS 10*3/mm3 284 267 268     Results from last 7 days   Lab Units 08/23/20  0622   SODIUM mmol/L 140   POTASSIUM mmol/L 3.9   CHLORIDE mmol/L 108*   CO2 mmol/L 21.0*   BUN mg/dL 19   CREATININE mg/dL 0.78   GLUCOSE mg/dL 93   CALCIUM mg/dL 8.3*     Results from last 7 days   Lab Units 08/23/20  0622   ALK PHOS U/L 63   BILIRUBIN mg/dL <0.2   ALT (SGPT) U/L 14   AST (SGOT) U/L 14     Results from last 7 days   Lab Units 08/22/20  0649   SED RATE mm/hr 30     Results from last 7 days   Lab Units 08/24/20  0601   CRP mg/dL 1.36*       Estimated Creatinine Clearance: 81.7  mL/min (by C-G formula based on SCr of 0.78 mg/dL).      Microbiology:  pending    Radiology:  Imaging Results (Last 72 Hours)     ** No results found for the last 72 hours. **          I personally reviewed the radiographic studies     Impression:   -- Sepsis presenting with WBC 21 and lactic acid 3.9, worsening acute kidney injury, surgical incision drainage and diarrhea   Focal colitis on CT scan GI panel, cdiff, stool culture all negative  ? Diverticulitis ? Mesenteric ischemia  -- Recent postoperative surgical wound infection status post laparoscopic hysterectomy and left oophorectomy on 8/3/2020-resolved with outpatient Keflex therapy.  -- Nausea likely secondary to Tygacil  -- C. difficile colitis 10 years ago  -- Neutrophilic leukocytosis-improving  -- Acute kidney injury - resolved  -- Diabetes mellitus type 2  -- Hypertension     PLAN/RECOMMENDATIONS:   Thank you for asking us to see Bhargavi Mohr, I recommend the following  -- agree with discharge tomorrow off antibiotics if she continues to improve  ID f/u probably not indicated if she improves off antibiotics    Yvonne Caldwell MD  8/24/2020

## 2020-08-24 NOTE — PROGRESS NOTES
Continued Stay Note  [facility]     Patient Name: Bhargavi Mohr  MRN: [id number]  Today's Date: 8/24/2020    Admit Date: 8/20/2020    Discharge Plan     Row Name 08/24/20 1334       Plan    Plan  Home    Plan Comments  Spoke with Mrs. Mohr at the bedside to discuss discharge planning. She denies having any discharge needs at this time. She is independent with mobility and ADL's. A friend will transport home via car.     Final Discharge Disposition Code  01 - home or self-care        Discharge Codes    No documentation.             Shavonne Gallegos RN

## 2020-08-24 NOTE — PLAN OF CARE
"Pt A&Ox4, VSS on RA, Sinus arrhythmia on monitor. Pt had one episode of emesis this shift, received prn Zofran. Pt also complained pf pain 6/10 in her abdomen radiating to her back and stated \"this is what it felt like when I first came in\", prn Morphine given. Remains on NS @ 50. Systolic 150-160's. Will continue to monitor.   "

## 2020-08-25 VITALS
HEART RATE: 73 BPM | TEMPERATURE: 98.7 F | HEIGHT: 65 IN | OXYGEN SATURATION: 98 % | DIASTOLIC BLOOD PRESSURE: 84 MMHG | RESPIRATION RATE: 16 BRPM | BODY MASS INDEX: 26.23 KG/M2 | WEIGHT: 157.4 LBS | SYSTOLIC BLOOD PRESSURE: 146 MMHG

## 2020-08-25 LAB
ANION GAP SERPL CALCULATED.3IONS-SCNC: 10 MMOL/L (ref 5–15)
BACTERIA SPEC AEROBE CULT: NORMAL
BACTERIA SPEC AEROBE CULT: NORMAL
BUN SERPL-MCNC: 18 MG/DL (ref 6–20)
BUN/CREAT SERPL: 21.4 (ref 7–25)
CALCIUM SPEC-SCNC: 9.1 MG/DL (ref 8.6–10.5)
CAMPYLOBACTER STL CULT: NORMAL
CHLORIDE SERPL-SCNC: 106 MMOL/L (ref 98–107)
CO2 SERPL-SCNC: 24 MMOL/L (ref 22–29)
CREAT SERPL-MCNC: 0.84 MG/DL (ref 0.57–1)
DEPRECATED RDW RBC AUTO: 40 FL (ref 37–54)
E COLI SXT STL QL IA: NEGATIVE
ERYTHROCYTE [DISTWIDTH] IN BLOOD BY AUTOMATED COUNT: 12.1 % (ref 12.3–15.4)
GFR SERPL CREATININE-BSD FRML MDRD: 71 ML/MIN/1.73
GLUCOSE BLDC GLUCOMTR-MCNC: 133 MG/DL (ref 70–130)
GLUCOSE SERPL-MCNC: 115 MG/DL (ref 65–99)
HCT VFR BLD AUTO: 38.4 % (ref 34–46.6)
HGB BLD-MCNC: 12.6 G/DL (ref 12–15.9)
Lab: NORMAL
Lab: NORMAL
MCH RBC QN AUTO: 29.9 PG (ref 26.6–33)
MCHC RBC AUTO-ENTMCNC: 32.8 G/DL (ref 31.5–35.7)
MCV RBC AUTO: 91 FL (ref 79–97)
PLATELET # BLD AUTO: 329 10*3/MM3 (ref 140–450)
PMV BLD AUTO: 10 FL (ref 6–12)
POTASSIUM SERPL-SCNC: 3.7 MMOL/L (ref 3.5–5.2)
RBC # BLD AUTO: 4.22 10*6/MM3 (ref 3.77–5.28)
SALM + SHIG STL CULT: NORMAL
SODIUM SERPL-SCNC: 140 MMOL/L (ref 136–145)
WBC # BLD AUTO: 11.26 10*3/MM3 (ref 3.4–10.8)

## 2020-08-25 PROCEDURE — 80048 BASIC METABOLIC PNL TOTAL CA: CPT | Performed by: INTERNAL MEDICINE

## 2020-08-25 PROCEDURE — 82962 GLUCOSE BLOOD TEST: CPT

## 2020-08-25 PROCEDURE — 99238 HOSP IP/OBS DSCHRG MGMT 30/<: CPT | Performed by: INTERNAL MEDICINE

## 2020-08-25 PROCEDURE — 85027 COMPLETE CBC AUTOMATED: CPT | Performed by: INTERNAL MEDICINE

## 2020-08-25 RX ADMIN — HYDROCORTISONE: 1 CREAM TOPICAL at 08:27

## 2020-08-25 RX ADMIN — FLUOXETINE HYDROCHLORIDE 20 MG: 20 CAPSULE ORAL at 08:26

## 2020-08-25 RX ADMIN — AMLODIPINE BESYLATE 5 MG: 5 TABLET ORAL at 08:25

## 2020-08-25 RX ADMIN — Medication 1 CAPSULE: at 08:26

## 2020-08-25 RX ADMIN — ROSUVASTATIN CALCIUM 40 MG: 20 TABLET, COATED ORAL at 08:25

## 2020-08-25 NOTE — PAYOR COMM NOTE
"Anastacia Mae RN  Utilization Review  P: [phone]  F: [phone]    Ref # [id number]  DC date = 8/25/2020  DC summary & updated clinicals attached    Bhargavi Alexis (55 y.o. Female)     Date of Birth Social Security Number Address Home Phone MRN    1965  [address] [facility] [phone] [id number]    Muslim Marital Status          None        Admission Date Admission Type Admitting Provider Attending Provider Department, Room/Bed    8/20/20 Emergency Clarence Skaggs MD  [facility] 3E, S338/1    Discharge Date Discharge Disposition Discharge Destination        8/25/2020 Home or Self Care              Attending Provider:  (none)   Allergies:  Codeine, Percocet [Oxycodone-acetaminophen], Adhesive Tape    Isolation:  None   Infection:  None   Code Status:  Prior    Ht:  165.1 cm (65\")   Wt:  71.4 kg (157 lb 6.4 oz)    Admission Cmt:  None   Principal Problem:  Sepsis without acute organ dysfunction (CMS/HCC) [A41.9]                 Active Insurance as of 8/20/2020     Primary Coverage     Payor Plan Insurance Group Employer/Plan Group    [employer]HEM BLUE CROSS ANTHEM BLUE CROSS BLUE SHIELD PPO [id number]     Payor Plan Address Payor Plan Phone Number Payor Plan Fax Number Effective Dates    [address] [phone]  1/1/2018 - None Entered    [place] [address]       Subscriber Name Subscriber Birth Date Member ID       BHARGAVI ALEXIS 1965 [id number]                 Emergency Contacts      (Rel.) Home Phone Work Phone Mobile Phone    billie marie (Daughter) -- -- [phone]    kaushal sutton (Significant Other) -- -- [phone]               Physician Progress Notes (last 24 hours) (Notes from 08/24/20 1457 through 08/25/20 1457)      Yvonne Caldwell MD at 08/24/20 1807          [facility] Progress Note    Admission Date: 8/20/2020    Bhargavi Alexis  1965  [id number]    Date: 8/24/2020    Antibiotics:  Anti-Infectives (From " admission, onward)    Ordered     Dose/Rate Route Frequency Start Stop    08/20/20 0342  piperacillin-tazobactam (ZOSYN) 3.375 g in iso-osmotic dextrose 50 ml (premix)     Ordering Provider:  Malgorzata Torres MD    3.375 g  over 30 Minutes Intravenous Once 08/20/20 0344 08/20/20 0550             Chief complaint: Abdominal pain     History of present illness:    Ms. Bhargavi Mohr is a 54 y.o. female who is being evaluated for sepsis.  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  She presented to the ED on 8/20 with complaints of abdominal pain.  Patient reportedly underwent a laparoscopic hysterectomy with left oophorectomy secondary to dysplasia of the cervix and high-grade EDMUND-2 on 8/3/2020.  Approximately 1 week ago she noted that 1 of the incision sites began draining and she presented to the [facility] he was prescribed Keflex.  She followed up with Dr. Guevara's office who removed the stitch and then packed the incision with gauze.  Approximately 4 days later she had another site that started to drain.  Patient states she removed the glue herself and allow the site to drain and stated that the site began to improve.  Approximately 3 to 4 days ago she developed severe back pain along with abdominal cramping that was progressively worsening over the past 24 hours prior to arrival.  She had self-limited  Nausea and 1 episode of  Vomiting. Over the last 24 hours she has had more frequent stools that are foul-smelling and dark in appearance.       Since arrival she has had T-max of 99.8 and has been hemodynamically stable.  Initial labs showed a neutrophilic leukocytosis (WBC 21.32), lactic acidosis (3.9), elevated creatinine 1.12.  UA showed no bacteria or pyuria.  Creatinine of 1.53  Blood cultures  on 8/20  are  no growth to date. CT of the abdomen and pelvis which showed a  partially calcified 17 mm left renal lesion that could represent a complex cyst, numerous colonic diverticuli, and inflammatory change in the left side of the pelvis could possibly represent focal colitis or diverticulitis C. difficile secondary to antibiotic usage.  C. difficile test was negative     She has a history of diverticulitis 2010 complicated by cdiff x 2 in 2010. At no time did her pain resemble her current symptoms. Exposures: she has horses, dogs, cats at home. She denies exposure to reptiles, birds or wild animals She has had no exposure to wild animals. Her SO has not been ill She ate take out food from a [employer] restaurant 8 days ago, 4 days prior to the onset of symptoms. She denies ingestion of raw eggs, unpasteurized dairy products, etc     She is currently receiving IV Zosyn, Flagyl andoral vancomycin.  ID has been consulted for further evaluation and treatment as well as antimicrobial therapy management.    8/22  Changed to tygacil yesterday  Afebrile, abdominal cramping improved but liquid stools persist - 5 last pm  She vomited once this am per Dr Medrano around the time of her tygacil dose  Fecal leukocytes and GI panel negative  8/23 afebrile, ongoing diaarrhea  Very nauseated today  Stool culture unremarkable  8/24 afeebrile of antibiotics. Despite increased diarrhea she is feeling MUCH better  Hoping to be discharged in am CRP remains normal           Review of Systems:  Constitutional--positive for appetite change.  HEENT--negative.  CV--negative  Resp--negative  GI-positive for foul-smelling diarrhea; nausea, vomiting and abdominal pain all improved.  --negative.  Lymph-negative.   Heme-negative.  MS--positive for back pain.  Neuro--negative.  Skin--positive for surgical incision and drainage        PE:  Vital Signs  Temp  Min: 97.2 °F (36.2 °C)  Max: 98.6 °F (37 °C)  BP  Min: 148/82  Max: 168/95  Pulse  Min: 65  Max: 85  Resp  Min: 16  Max: 18  SpO2  Min: 97 %  Max: 97 %  GENERAL:  Awake and alert, in no acute distress.   HEENT: Normocephalic, atraumatic.  PERRL. EOMI. No conjunctival injection. No icterus. Oropharynx clear without evidence of thrush or exudate. No evidence of peridontal disease.    NECK: Supple without nuchal rigidity. No mass.  LYMPH: No cervical, axillary lymphadenopathy.  HEART: S1/S2 noted with no audible murmur, rubs or gallops noted.   LUNGS: CTA bilaterally with no wheezing or rhonchi noted.  Nonlabored respirations.  ABDOMEN: Soft, nontender, nondistended.  Hyperactive bowel sounds x4 quadrants.  No rebound or guarding with no appreciable HSM noted.    EXT:  No cyanosis, clubbing or edema. No cord.  :   Without Khan catheter.  MSK: FROM.    SKIN: Warm and dry without rashes or lesions noted.  She has 4 surgical incisions to the abdomen which are well-healed with well approximated edges and only mild erythema.  NEURO: Oriented to PPT. No focal deficits on motor/sensory exam at arms/legs.  PSYCHIATRIC: Normal insight and judgement. Cooperative with PE     Laboratory Data    Results from last 7 days   Lab Units 08/23/20  0622 08/22/20  0649 08/21/20  0407   WBC 10*3/mm3 10.11 12.68* 11.01*   HEMOGLOBIN g/dL 10.7* 11.1* 11.0*   HEMATOCRIT % 32.9* 34.6 35.0   PLATELETS 10*3/mm3 284 267 268     Results from last 7 days   Lab Units 08/23/20  0622   SODIUM mmol/L 140   POTASSIUM mmol/L 3.9   CHLORIDE mmol/L 108*   CO2 mmol/L 21.0*   BUN mg/dL 19   CREATININE mg/dL 0.78   GLUCOSE mg/dL 93   CALCIUM mg/dL 8.3*     Results from last 7 days   Lab Units 08/23/20  0622   ALK PHOS U/L 63   BILIRUBIN mg/dL <0.2   ALT (SGPT) U/L 14   AST (SGOT) U/L 14     Results from last 7 days   Lab Units 08/22/20  0649   SED RATE mm/hr 30     Results from last 7 days   Lab Units 08/24/20  0601   CRP mg/dL 1.36*       Estimated Creatinine Clearance: 81.7 mL/min (by C-G formula based on SCr of 0.78 mg/dL).      Microbiology:  pending    Radiology:  Imaging Results (Last 72 Hours)     ** No  results found for the last 72 hours. **          I personally reviewed the radiographic studies     Impression:   -- Sepsis presenting with WBC 21 and lactic acid 3.9, worsening acute kidney injury, surgical incision drainage and diarrhea   Focal colitis on CT scan GI panel, cdiff, stool culture all negative  ? Diverticulitis ? Mesenteric ischemia  -- Recent postoperative surgical wound infection status post laparoscopic hysterectomy and left oophorectomy on 8/3/2020-resolved with outpatient Keflex therapy.  -- Nausea likely secondary to Tygacil  -- C. difficile colitis 10 years ago  -- Neutrophilic leukocytosis-improving  -- Acute kidney injury - resolved  -- Diabetes mellitus type 2  -- Hypertension     PLAN/RECOMMENDATIONS:   Thank you for asking us to see Bhargavi Mohr, I recommend the following  -- agree with discharge tomorrow off antibiotics if she continues to improve  ID f/u probably not indicated if she improves off antibiotics    Yvonne Caldwell MD  2020          Electronically signed by Yvonne Caldwell MD at 209     Clarence Skaggs MD at 20 1727              [facility] Medicine Services  PROGRESS NOTE    Patient Name: Bhargavi Mohr  : 1965  MRN: [id number]    Date of Admission: 2020  Primary Care Physician: Charu Jordan MD    Subjective   Subjective     CC:  Abdominal pain and diarrhea    HPI:  Overall is improved, but diarrhea has picked up a little today.  4 episodes since noon.  Very watery.  Mild crampy abdominal pain.    Review of Systems  Gen- No fevers, chills  CV- No chest pain, palpitations  Resp- No cough, dyspnea  GI- + nausea and diarrhea      Objective   Objective     Vital Signs:   Temp:  [97.2 °F (36.2 °C)-98.6 °F (37 °C)] 98.5 °F (36.9 °C)  Heart Rate:  [65-85] 68  Resp:  [16-18] 16  BP: (148-168)/(82-95) 160/94        Physical Exam:  Constitutional: No acute distress, awake, alert, amublated from the bathroom and  overall looks good  HENT: NCAT, mucous membranes moist  Respiratory: Clear to auscultation bilaterally, respiratory effort normal   Cardiovascular: RRR, no murmurs, rubs, or gallops  Gastrointestinal: Positive bowel sounds, soft, lap sites looks good.  Mild RLQ and umbilical tenderness, no r/g  Musculoskeletal: No bilateral ankle edema  Psychiatric: Appropriate affect, cooperative  Neurologic: Oriented x 3, no focal deficits  Skin: No rashes        Results Reviewed:  Results from last 7 days   Lab Units 08/23/20  0622 08/22/20  0649 08/21/20  0407   WBC 10*3/mm3 10.11 12.68* 11.01*   HEMOGLOBIN g/dL 10.7* 11.1* 11.0*   HEMATOCRIT % 32.9* 34.6 35.0   PLATELETS 10*3/mm3 284 267 268   PROCALCITONIN ng/mL  --  0.05  --      Results from last 7 days   Lab Units 08/23/20  0622 08/22/20  0649 08/22/20  0029 08/21/20  1753 08/21/20  0407   SODIUM mmol/L 140 140  --   --  143   POTASSIUM mmol/L 3.9 4.3  --   --  4.8   CHLORIDE mmol/L 108* 107  --   --  113*   CO2 mmol/L 21.0* 22.0  --   --  22.0   BUN mg/dL 19 17  --   --  16   CREATININE mg/dL 0.78 1.00  --   --  1.53*   GLUCOSE mg/dL 93 115*  --   --  127*   CALCIUM mg/dL 8.3* 8.7  --   --  8.5*   ALT (SGPT) U/L 14 14  --   --  16   AST (SGOT) U/L 14 15  --   --  23   TROPONIN T ng/mL  --   --  <0.010 <0.010  --      Estimated Creatinine Clearance: 81.7 mL/min (by C-G formula based on SCr of 0.78 mg/dL).    Microbiology Results Abnormal     Procedure Component Value - Date/Time    Blood Culture - Blood, Arm, Left [[id number]] Collected:  08/20/20 0322    Lab Status:  Preliminary result Specimen:  Blood from Arm, Left Updated:  08/24/20 0348     Blood Culture No growth at 4 days    Blood Culture - Blood, Arm, Left [[id number]] Collected:  08/20/20 0322    Lab Status:  Preliminary result Specimen:  Blood from Arm, Left Updated:  08/24/20 0346     Blood Culture No growth at 4 days    Stool Culture (Reference Lab) - Stool, Per Rectum [[id number]] Collected:  08/21/20 1312    Lab  Status:  Preliminary result Specimen:  Stool from Per Rectum Updated:  08/23/20 2106     Salmonella/Shigella Screen Final report     Result 1 Comment     Comment: No Salmonella or Shigella recovered.        E coli, Shiga toxin Assay Negative    Narrative:       Performed at:  [address] [place] [address]  [id number]  : Leo Issa PhD, Phone:  [phone]    Gastrointestinal Panel, PCR - Stool, Per Rectum [[id number]]  (Normal) Collected:  08/21/20 1045    Lab Status:  Final result Specimen:  Stool from Per Rectum Updated:  08/21/20 1426     Campylobacter Not Detected     Plesiomonas shigelloides Not Detected     Salmonella Not Detected     Vibrio Not Detected     Vibrio cholerae Not Detected     Yersinia enterocolitica Not Detected     Enteroaggregative E. coli (EAEC) Not Detected     Enteropathogenic E. coli (EPEC) Not Detected     Enterotoxigenic E. coli (ETEC) lt/st Not Detected     Shiga-like toxin-producing E. coli (STEC) stx1/stx2 Not Detected     E. coli O157 Not Detected     Shigella/Enteroinvasive E. coli (EIEC) Not Detected     Cryptosporidium Not Detected     Cyclospora cayetanensis Not Detected     Entamoeba histolytica Not Detected     Giardia lamblia Not Detected     Adenovirus F40/41 Not Detected     Astrovirus Not Detected     Norovirus GI/GII Not Detected     Rotavirus A Not Detected     Sapovirus (I, II, IV or V) Not Detected    Fecal Leukocytes - Stool, Per Rectum [[id number]]  (Normal) Collected:  08/21/20 1045    Lab Status:  Final result Specimen:  Stool from Per Rectum Updated:  08/21/20 1416     Fecal Leukocytes No WBC's Seen    Clostridium Difficile Toxin - Stool, Per Rectum [[id number]] Collected:  08/21/20 1045    Lab Status:  Final result Specimen:  Stool from Per Rectum Updated:  08/21/20 1154    Narrative:       The following orders were created for panel order Clostridium Difficile Toxin - Stool, Per Rectum.  Procedure                                Abnormality         Status                     ---------                               -----------         ------                     Clostridium Difficile To...[[id number]]  Normal              Final result                 Please view results for these tests on the individual orders.    Clostridium Difficile Toxin, PCR - Stool, Per Rectum [[id number]]  (Normal) Collected:  08/21/20 1045    Lab Status:  Final result Specimen:  Stool from Per Rectum Updated:  08/21/20 1154     C. Difficile Toxins by PCR Not Detected    Narrative:       Performance characteristics of test not established for patients <2 years of age.  Negative for Toxigenic C. Difficile          Imaging Results (Last 24 Hours)     ** No results found for the last 24 hours. **               I have reviewed the medications:  Scheduled Meds:    amLODIPine 5 mg Oral Q24H   enoxaparin 40 mg Subcutaneous Q24H   FLUoxetine 20 mg Oral Daily   hydrocortisone  Topical Q12H   insulin lispro 0-7 Units Subcutaneous TID AC   lactobacillus acidophilus 1 capsule Oral Daily   rosuvastatin 40 mg Oral Daily     Continuous Infusions:     PRN Meds:.•  acetaminophen  •  ALPRAZolam  •  calcium carbonate EX  •  dextrose  •  dextrose  •  glucagon (human recombinant)  •  labetalol  •  magnesium sulfate **OR** magnesium sulfate **OR** magnesium sulfate  •  Morphine **AND** naloxone  •  ondansetron  •  potassium chloride **OR** potassium chloride **OR** potassium chloride  •  sodium chloride    Assessment/Plan   Assessment & Plan     Active Hospital Problems    Diagnosis  POA   • **Sepsis without acute organ dysfunction (CMS/HCC) [A41.9]  Yes   • Type 2 diabetes mellitus (CMS/HCC) [E11.9]  Yes   • History of melanoma [Z85.820]  Not Applicable   • Essential hypertension [I10]  Yes   • Diverticulosis [K57.90]  Yes   • Hypokalemia [E87.6]  Unknown   • HERO (acute kidney injury) (CMS/[facility]) [N17.9]  Unknown      Resolved Hospital Problems   No resolved problems to display.        Brief  "Hospital Course to date:  Bhargavi Mohr is a 55 y.o. female with history of anxiety, DL, HTN, diverticulosis, DMII, prior C diff colitis and melanoma, with recent laparoscopic hysterectomy and slapingoophorectomy (8/3/20 Dr Marmolejo), who just finished a course of keflex for redness and drainage at one of her laparoscopic incision sites, presents with chills, abdominal pain and loose dark stool.    Sepsis  - leukocytosis (WBC 21), lactic acidosis (lactate 3.9) and HERO, all secondary to Colitis  - clinically resolved    Colitis  - C diff PCR and GI panel negative  - all abx stopped 8/23  - WBC normalized, GI panel negative  - will hold on repeat scan today, consider tomorrow if any worse.    Acute Kidney injury  - suspect secondary to sepsis and volume depletion from diarrhea, in setting of home NSAID, ACEi and diuretic use.  - resolved    Renal lesion  - discussed with patient, will need dedicated renal scan (with contrast as possible) as an outpatient. Follow up with PCP next week.     Chest tightness  - patient says she felt some epigastric pain and a sensation of a \"band\" around her lower ribs Friday (8/21) evening. EKG without ST changes. Troponin x 2 negative. Patient says symptoms much better after taking tylenol and changing position in bed.    Lactic acidosis  - resolved    DMII  - a1c 6.5  - continue current insulin    HTN  - HCTZ and lisinopril held, maybe resume tomorrow  - add prn labetolol    Anxiety    Overall is improved.  Plan home 1-2 days as long as no set backs.    DVT Prophylaxis:  Mechanical, ambulate, lovenox      Disposition: I expect the patient to be discharged home in 1-2 days.      CODE STATUS:   Code Status and Medical Interventions:   Ordered at: 08/20/20 0601     Level Of Support Discussed With:    Patient     Code Status:    CPR     Medical Interventions (Level of Support Prior to Arrest):    Full       Electronically signed by Clarence Skaggs MD, 08/24/20, " 17:28.          Electronically signed by Clarence Skaggs MD at 20 0443       Consult Notes (last 24 hours) (Notes from 20 1457 through 20 1457)    No notes of this type exist for this encounter.            Discharge Summary      Clarence Skaggs MD at 20 0840              [facility] Medicine Services  DISCHARGE SUMMARY    Patient Name: Bhargavi Mohr  : 1965  MRN: [id number]    Date of Admission: 2020  2:51 AM  Date of Discharge:  20  Primary Care Physician: Charu Jordan MD    Consults     Date and Time Order Name Status Description    2020 0714 Inpatient Infectious Diseases Consult Completed     2020 1117 Inpatient Gynecologic Oncology Consult Completed           Hospital Course     Presenting Problem:   Sepsis without acute organ dysfunction, due to unspecified organism (CMS/HCC) [A41.9]    Active Hospital Problems    Diagnosis  POA   • **Sepsis without acute organ dysfunction (CMS/HCC) [A41.9]  Yes   • Type 2 diabetes mellitus (CMS/HCC) [E11.9]  Yes   • History of melanoma [Z85.820]  Not Applicable   • Essential hypertension [I10]  Yes   • Diverticulosis [K57.90]  Yes   • Hypokalemia [E87.6]  Yes   • HERO (acute kidney injury) (CMS/HCC) [N17.9]  Yes      Resolved Hospital Problems   No resolved problems to display.          Hospital Course:  Bhargavi Mohr is a 55 y.o. female with a history of anxiety, hyperlipidemia, hypertension, diet-controlled type 2 diabetes who presented to the emergency room with complaints of abdominal pain.  Recently underwent a total laparoscopic hysterectomy by Dr. Guevara on 8/3/2020.  About a week ago she had some drainage at 1 of the incision sites and was placed on Keflex.  Then 3 to 4 days ago she had some abdominal cramping in the right lower quadrant radiating to her back.  Associated with some diarrhea.  No fevers.  CT of her abdomen pelvis showed a subtle inflammatory change on the left side of the  pelvis which could represent colitis versus diverticulitis.  White blood cell count was also 21,000 on presentation.  She was started on empiric antibiotics and admitted to the hospital service for further management.    Initially concern was for a C. difficile however toxin came back negative and was stopped.  Infectious disease was consulted.  Transition to Tygacil for 2 days with improvement clinically along with her white blood cell count.  GI PCR panel also returned negative.  Ultimately decision was made to stop antibiotics due to rapid improvement.  She has now pain-free, diarrhea has resolved, she is eating and drinking well and very anxious to get home.  No infectious disease follow-up is warranted at this time.  She will follow-up with Dr. Guevara as previously scheduled.  Of note CT scan of her abdomen did show a 17 mm renal lesion on the left which could represent a complex cyst or solid mass.  Recommends dedicated CT scan renal protocol to be done as an outpatient, this was discussed with the patient.      Discharge Follow Up Recommendations for outpatient labs/diagnostics:  Will need outpatient follow-up of 17 mm left renal lesion, multiphase renal CT scan recommended per radiology.  Discussed with patient.    Day of Discharge     HPI:   No BM since ~1730 yesterday.  No pain.  Eating well.  Wants to go home.    Review of Systems  Gen- No fevers, chills  CV- No chest pain, palpitations  Resp- No cough, dyspnea  GI- as above    Vital Signs:   Temp:  [98.2 °F (36.8 °C)-99.1 °F (37.3 °C)] 98.7 °F (37.1 °C)  Heart Rate:  [68-86] 73  Resp:  [16-18] 16  BP: (136-160)/(71-94) 146/84     Physical Exam:  Constitutional: No acute distress, awake, alert, up in bed and eating breakfast  HENT: NCAT, mucous membranes moist  Respiratory: Clear to auscultation bilaterally, respiratory effort normal   Cardiovascular: RRR, no murmurs    Gastrointestinal: Positive bowel sounds, soft, nontender throughout,  nondistended  Musculoskeletal: No bilateral ankle edema  Psychiatric: Appropriate affect, cooperative  Neurologic: Oriented x 3, no deficits  Skin: No rashes      Pertinent  and/or Most Recent Results     Results from last 7 days   Lab Units 08/25/20  0410 08/23/20  0622 08/22/20  0649 08/21/20  0407 08/21/20  0019 08/20/20  2019 08/20/20  1540 08/20/20  1242  08/20/20  0302   WBC 10*3/mm3 11.26* 10.11 12.68* 11.01*  --   --   --  12.35*  --  21.32*   HEMOGLOBIN g/dL 12.6 10.7* 11.1* 11.0*  --   --   --  12.1  --  12.9   HEMATOCRIT % 38.4 32.9* 34.6 35.0  --   --   --  37.5  --  39.0   PLATELETS 10*3/mm3 329 284 267 268  --   --   --  271  --  326   SODIUM mmol/L 140 140 140 143 144 141 143  --    < > 137   POTASSIUM mmol/L 3.7 3.9 4.3 4.8 4.5 4.4 4.4  --    < > 3.1*   CHLORIDE mmol/L 106 108* 107 113* 113* 111* 111*  --    < > 101   CO2 mmol/L 24.0 21.0* 22.0 22.0 23.0 23.0 23.0  --    < > 19.0*   BUN mg/dL 18 19 17 16 16 17 19  --    < > 21*   CREATININE mg/dL 0.84 0.78 1.00 1.53* 1.64* 1.38* 1.45*  --    < > 1.12*   GLUCOSE mg/dL 115* 93 115* 127* 118* 148* 128*  --    < > 239*   CALCIUM mg/dL 9.1 8.3* 8.7 8.5* 8.7 8.5* 8.1*  --    < > 9.6    < > = values in this interval not displayed.     Results from last 7 days   Lab Units 08/23/20  0622 08/22/20  0649 08/21/20 0407 08/20/20  0302   BILIRUBIN mg/dL <0.2 0.2 0.4 0.2   ALK PHOS U/L 63 62 66 74   ALT (SGPT) U/L 14 14 16 23   AST (SGOT) U/L 14 15 23 20           Invalid input(s): TG, LDLCALC, LDLREALC  Results from last 7 days   Lab Units 08/22/20  0649 08/22/20  0029 08/21/20  1753 08/20/20  1242 08/20/20  0845 08/20/20  0312   HEMOGLOBIN A1C %  --   --   --  6.50*  --   --    TROPONIN T ng/mL  --  <0.010 <0.010  --   --   --    PROCALCITONIN ng/mL 0.05  --   --   --   --   --    LACTATE mmol/L 0.8  --   --   --  1.4 3.9*       Brief Urine Lab Results  (Last result in the past 365 days)      Color   Clarity   Blood   Leuk Est   Nitrite   Protein   CREAT   Urine  HCG        08/20/20 0343 Yellow Clear Small (1+) Trace Negative 100 mg/dL (2+)               Microbiology Results Abnormal     Procedure Component Value - Date/Time    Stool Culture (Reference Lab) - Stool, Per Rectum [[id number]] Collected:  08/21/20 1312    Lab Status:  Final result Specimen:  Stool from Per Rectum Updated:  08/25/20 0720     Salmonella/Shigella Screen Final report     Result 1 Comment     Comment: No Salmonella or Shigella recovered.        E coli, Shiga toxin Assay Negative    Narrative:       Performed at:  [address] [place] [address]  [id number]  : Leo Issa PhD, Phone:  [phone]    Blood Culture - Blood, Arm, Left [[id number]] Collected:  08/20/20 0322    Lab Status:  Final result Specimen:  Blood from Arm, Left Updated:  08/25/20 0345     Blood Culture No growth at 5 days    Blood Culture - Blood, Arm, Left [[id number]] Collected:  08/20/20 0322    Lab Status:  Final result Specimen:  Blood from Arm, Left Updated:  08/25/20 0345     Blood Culture No growth at 5 days    Gastrointestinal Panel, PCR - Stool, Per Rectum [[id number]]  (Normal) Collected:  08/21/20 1045    Lab Status:  Final result Specimen:  Stool from Per Rectum Updated:  08/21/20 1426     Campylobacter Not Detected     Plesiomonas shigelloides Not Detected     Salmonella Not Detected     Vibrio Not Detected     Vibrio cholerae Not Detected     Yersinia enterocolitica Not Detected     Enteroaggregative E. coli (EAEC) Not Detected     Enteropathogenic E. coli (EPEC) Not Detected     Enterotoxigenic E. coli (ETEC) lt/st Not Detected     Shiga-like toxin-producing E. coli (STEC) stx1/stx2 Not Detected     E. coli O157 Not Detected     Shigella/Enteroinvasive E. coli (EIEC) Not Detected     Cryptosporidium Not Detected     Cyclospora cayetanensis Not Detected     Entamoeba histolytica Not Detected     Giardia lamblia Not Detected     Adenovirus F40/41 Not Detected     Astrovirus Not Detected      Norovirus GI/GII Not Detected     Rotavirus A Not Detected     Sapovirus (I, II, IV or V) Not Detected    Fecal Leukocytes - Stool, Per Rectum [[id number]]  (Normal) Collected:  08/21/20 1045    Lab Status:  Final result Specimen:  Stool from Per Rectum Updated:  08/21/20 1416     Fecal Leukocytes No WBC's Seen    Clostridium Difficile Toxin - Stool, Per Rectum [[id number]] Collected:  08/21/20 1045    Lab Status:  Final result Specimen:  Stool from Per Rectum Updated:  08/21/20 1154    Narrative:       The following orders were created for panel order Clostridium Difficile Toxin - Stool, Per Rectum.  Procedure                               Abnormality         Status                     ---------                               -----------         ------                     Clostridium Difficile To...[[id number]]  Normal              Final result                 Please view results for these tests on the individual orders.    Clostridium Difficile Toxin, PCR - Stool, Per Rectum [[id number]]  (Normal) Collected:  08/21/20 1045    Lab Status:  Final result Specimen:  Stool from Per Rectum Updated:  08/21/20 1154     C. Difficile Toxins by PCR Not Detected    Narrative:       Performance characteristics of test not established for patients <2 years of age.  Negative for Toxigenic C. Difficile          Imaging Results (All)     Procedure Component Value Units Date/Time    CT Abdomen Pelvis Without Contrast [[id number]] Collected:  08/20/20 0327     Updated:  08/20/20 0329    Narrative:       CT Abdomen Pelvis WO    INDICATION:   Abdominal pain all over for 3 days    TECHNIQUE:   CT of the abdomen and pelvis without IV contrast. Coronal and sagittal reconstructions were obtained.  Radiation dose reduction techniques included automated exposure control or exposure modulation based on body size. Count of known CT and cardiac nuc  med studies performed in previous 12 months: 0.     COMPARISON:   None  available.    FINDINGS:  Abdomen: Lung bases are clear the liver, gallbladder, spleen, pancreas, adrenal glands and right kidney normal.    The left kidney has an oval lesion in the midportion of the kidney. This is just anterior to the renal parenchyma. It measures about 17 mm maximum dimension and is partially calcified. This is likely a complex cyst. The aneurysm is less likely.    The aorta is normal in size. There is no adenopathy. The bowel shows numerous left colon diverticuli. There seems be some mild inflammatory stranding in the left side of the pelvis. I cannot clearly identify any site of diverticulitis there is no colon  wall thickening identified.    Pelvis: Bladder is normal. The uterus is been removed. There are no adnexal masses. Bones are unremarkable.      Impression:       Partially calcified 17 mm left renal lesion. This could represent a complex cyst, solid mass within the aneurysm. Follow-up as an outpatient with multiphase renal CT scan is recommended.    Numerous colonic diverticuli    This seems be some subtle inflammatory change in the left side of the pelvis. This is best appreciated on image 72. This could possibly represent focal colitis or diverticulitis.          Signer Name: Javier Lima MD   Signed: 8/20/2020 3:27 AM   [email or web address]    [facility]            Discharge Details        Discharge Medications      Continue These Medications      Instructions Start Date   acetaminophen 325 MG tablet  Commonly known as:  TYLENOL   650 mg, Oral, Every 6 Hours PRN      ALPRAZolam 0.25 MG tablet  Commonly known as:  XANAX   0.25 mg, Oral, 3 Times Daily PRN      eszopiclone 3 MG tablet  Commonly known as:  LUNESTA   3 mg, Oral, Nightly PRN      FLUoxetine 20 MG capsule  Commonly known as:  PROzac   20 mg, Oral, Daily      ibuprofen 200 MG tablet  Commonly known as:  ADVIL,MOTRIN   200 mg, Oral, Every 6 Hours PRN      lisinopril-hydrochlorothiazide 20-25 MG  per tablet  Commonly known as:  PRINZIDE,ZESTORETIC   1 tablet, Oral, Every Morning      omeprazole 20 MG capsule  Commonly known as:  priLOSEC   20 mg, Oral, Daily      ondansetron 4 MG tablet  Commonly known as:  ZOFRAN   4 mg, Oral, Every 6 Hours PRN      rosuvastatin 40 MG tablet  Commonly known as:  CRESTOR   40 mg, Oral, Daily      valACYclovir 1000 MG tablet  Commonly known as:  VALTREX   2,000 mg, Oral, 2 Times Daily PRN             Allergies   Allergen Reactions   • Codeine Dizziness and Delirium     Feels drunk   • Percocet [Oxycodone-Acetaminophen] Itching   • Adhesive Tape Dermatitis         Discharge Disposition:  Home or Self Care    Diet:  Hospital:  Diet Order   Procedures   • Diet Regular; Thin; Consistent Carbohydrate       Activity:  Activity Instructions     Activity as Tolerated          CODE STATUS:    Code Status and Medical Interventions:   Ordered at: 08/20/20 0601     Level Of Support Discussed With:    Patient     Code Status:    CPR     Medical Interventions (Level of Support Prior to Arrest):    Full       No future appointments.    Additional Instructions for the Follow-ups that You Need to Schedule     Discharge Follow-up with PCP   As directed       Currently Documented PCP:    Charu Jordan MD    PCP Phone Number:    [phone]     Follow Up Details:  follow up PCP Dr Jordan one week         Discharge Follow-up with Specified Provider: She will call Dr. Marmolejo office to schedule follow up   As directed      To:  She will call Dr. Marmolejo office to schedule follow up               Electronically signed by Clarence Skaggs MD, 08/25/20, 8:40 AM.      Time Spent on Discharge:  I spent  26  minutes on this discharge activity which included: face-to-face encounter with the patient, reviewing the data in the system, coordination of the care with the nursing staff as well as consultants, documentation, and entering orders.            Electronically signed by Clarence Skaggs MD at  08/25/20 0848

## 2020-08-25 NOTE — DISCHARGE SUMMARY
[facility] Medicine Services  DISCHARGE SUMMARY    Patient Name: Bhargavi Mohr  : 1965  MRN: [id number]    Date of Admission: 2020  2:51 AM  Date of Discharge:  20  Primary Care Physician: Charu Jordan MD    Consults     Date and Time Order Name Status Description    2020 0714 Inpatient Infectious Diseases Consult Completed     2020 1117 Inpatient Gynecologic Oncology Consult Completed           Hospital Course     Presenting Problem:   Sepsis without acute organ dysfunction, due to unspecified organism (CMS/HCC) [A41.9]    Active Hospital Problems    Diagnosis  POA   • **Sepsis without acute organ dysfunction (CMS/HCC) [A41.9]  Yes   • Type 2 diabetes mellitus (CMS/HCC) [E11.9]  Yes   • History of melanoma [Z85.820]  Not Applicable   • Essential hypertension [I10]  Yes   • Diverticulosis [K57.90]  Yes   • Hypokalemia [E87.6]  Yes   • HERO (acute kidney injury) (CMS/HCC) [N17.9]  Yes      Resolved Hospital Problems   No resolved problems to display.          Hospital Course:  Bhargavi Mohr is a 55 y.o. female with a history of anxiety, hyperlipidemia, hypertension, diet-controlled type 2 diabetes who presented to the emergency room with complaints of abdominal pain.  Recently underwent a total laparoscopic hysterectomy by Dr. Guevara on 8/3/2020.  About a week ago she had some drainage at 1 of the incision sites and was placed on Keflex.  Then 3 to 4 days ago she had some abdominal cramping in the right lower quadrant radiating to her back.  Associated with some diarrhea.  No fevers.  CT of her abdomen pelvis showed a subtle inflammatory change on the left side of the pelvis which could represent colitis versus diverticulitis.  White blood cell count was also 21,000 on presentation.  She was started on empiric antibiotics and admitted to the hospital service for further management.    Initially concern was for a C. difficile however toxin came back  negative and was stopped.  Infectious disease was consulted.  Transition to Tygacil for 2 days with improvement clinically along with her white blood cell count.  GI PCR panel also returned negative.  Ultimately decision was made to stop antibiotics due to rapid improvement.  She has now pain-free, diarrhea has resolved, she is eating and drinking well and very anxious to get home.  No infectious disease follow-up is warranted at this time.  She will follow-up with Dr. Guevara as previously scheduled.  Of note CT scan of her abdomen did show a 17 mm renal lesion on the left which could represent a complex cyst or solid mass.  Recommends dedicated CT scan renal protocol to be done as an outpatient, this was discussed with the patient.      Discharge Follow Up Recommendations for outpatient labs/diagnostics:  Will need outpatient follow-up of 17 mm left renal lesion, multiphase renal CT scan recommended per radiology.  Discussed with patient.    Day of Discharge     HPI:   No BM since ~1730 yesterday.  No pain.  Eating well.  Wants to go home.    Review of Systems  Gen- No fevers, chills  CV- No chest pain, palpitations  Resp- No cough, dyspnea  GI- as above    Vital Signs:   Temp:  [98.2 °F (36.8 °C)-99.1 °F (37.3 °C)] 98.7 °F (37.1 °C)  Heart Rate:  [68-86] 73  Resp:  [16-18] 16  BP: (136-160)/(71-94) 146/84     Physical Exam:  Constitutional: No acute distress, awake, alert, up in bed and eating breakfast  HENT: NCAT, mucous membranes moist  Respiratory: Clear to auscultation bilaterally, respiratory effort normal   Cardiovascular: RRR, no murmurs    Gastrointestinal: Positive bowel sounds, soft, nontender throughout, nondistended  Musculoskeletal: No bilateral ankle edema  Psychiatric: Appropriate affect, cooperative  Neurologic: Oriented x 3, no deficits  Skin: No rashes      Pertinent  and/or Most Recent Results     Results from last 7 days   Lab Units 08/25/20  0410 08/23/20  0622 08/22/20  0649 08/21/20  0407  08/21/20  0019 08/20/20  2019 08/20/20  1540 08/20/20  1242  08/20/20  0302   WBC 10*3/mm3 11.26* 10.11 12.68* 11.01*  --   --   --  12.35*  --  21.32*   HEMOGLOBIN g/dL 12.6 10.7* 11.1* 11.0*  --   --   --  12.1  --  12.9   HEMATOCRIT % 38.4 32.9* 34.6 35.0  --   --   --  37.5  --  39.0   PLATELETS 10*3/mm3 329 284 267 268  --   --   --  271  --  326   SODIUM mmol/L 140 140 140 143 144 141 143  --    < > 137   POTASSIUM mmol/L 3.7 3.9 4.3 4.8 4.5 4.4 4.4  --    < > 3.1*   CHLORIDE mmol/L 106 108* 107 113* 113* 111* 111*  --    < > 101   CO2 mmol/L 24.0 21.0* 22.0 22.0 23.0 23.0 23.0  --    < > 19.0*   BUN mg/dL 18 19 17 16 16 17 19  --    < > 21*   CREATININE mg/dL 0.84 0.78 1.00 1.53* 1.64* 1.38* 1.45*  --    < > 1.12*   GLUCOSE mg/dL 115* 93 115* 127* 118* 148* 128*  --    < > 239*   CALCIUM mg/dL 9.1 8.3* 8.7 8.5* 8.7 8.5* 8.1*  --    < > 9.6    < > = values in this interval not displayed.     Results from last 7 days   Lab Units 08/23/20  0622 08/22/20  0649 08/21/20  0407 08/20/20  0302   BILIRUBIN mg/dL <0.2 0.2 0.4 0.2   ALK PHOS U/L 63 62 66 74   ALT (SGPT) U/L 14 14 16 23   AST (SGOT) U/L 14 15 23 20           Invalid input(s): TG, LDLCALC, LDLREALC  Results from last 7 days   Lab Units 08/22/20  0649 08/22/20  0029 08/21/20  1753 08/20/20  1242 08/20/20  0845 08/20/20  0312   HEMOGLOBIN A1C %  --   --   --  6.50*  --   --    TROPONIN T ng/mL  --  <0.010 <0.010  --   --   --    PROCALCITONIN ng/mL 0.05  --   --   --   --   --    LACTATE mmol/L 0.8  --   --   --  1.4 3.9*       Brief Urine Lab Results  (Last result in the past 365 days)      Color   Clarity   Blood   Leuk Est   Nitrite   Protein   CREAT   Urine HCG        08/20/20 0343 Yellow Clear Small (1+) Trace Negative 100 mg/dL (2+)               Microbiology Results Abnormal     Procedure Component Value - Date/Time    Stool Culture (Reference Lab) - Stool, Per Rectum [[id number]] Collected:  08/21/20 1312    Lab Status:  Final result Specimen:  Stool  from Per Rectum Updated:  08/25/20 0720     Salmonella/Shigella Screen Final report     Result 1 Comment     Comment: No Salmonella or Shigella recovered.        E coli, Shiga toxin Assay Negative    Narrative:       Performed at:  [address] [place] [address]  [id number]  : Leo Issa PhD, Phone:  [phone]    Blood Culture - Blood, Arm, Left [[id number]] Collected:  08/20/20 0322    Lab Status:  Final result Specimen:  Blood from Arm, Left Updated:  08/25/20 0345     Blood Culture No growth at 5 days    Blood Culture - Blood, Arm, Left [[id number]] Collected:  08/20/20 0322    Lab Status:  Final result Specimen:  Blood from Arm, Left Updated:  08/25/20 0345     Blood Culture No growth at 5 days    Gastrointestinal Panel, PCR - Stool, Per Rectum [[id number]]  (Normal) Collected:  08/21/20 1045    Lab Status:  Final result Specimen:  Stool from Per Rectum Updated:  08/21/20 1426     Campylobacter Not Detected     Plesiomonas shigelloides Not Detected     Salmonella Not Detected     Vibrio Not Detected     Vibrio cholerae Not Detected     Yersinia enterocolitica Not Detected     Enteroaggregative E. coli (EAEC) Not Detected     Enteropathogenic E. coli (EPEC) Not Detected     Enterotoxigenic E. coli (ETEC) lt/st Not Detected     Shiga-like toxin-producing E. coli (STEC) stx1/stx2 Not Detected     E. coli O157 Not Detected     Shigella/Enteroinvasive E. coli (EIEC) Not Detected     Cryptosporidium Not Detected     Cyclospora cayetanensis Not Detected     Entamoeba histolytica Not Detected     Giardia lamblia Not Detected     Adenovirus F40/41 Not Detected     Astrovirus Not Detected     Norovirus GI/GII Not Detected     Rotavirus A Not Detected     Sapovirus (I, II, IV or V) Not Detected    Fecal Leukocytes - Stool, Per Rectum [[id number]]  (Normal) Collected:  08/21/20 1045    Lab Status:  Final result Specimen:  Stool from Per Rectum Updated:  08/21/20 1416     Fecal Leukocytes No  WBC's Seen    Clostridium Difficile Toxin - Stool, Per Rectum [[id number]] Collected:  08/21/20 1045    Lab Status:  Final result Specimen:  Stool from Per Rectum Updated:  08/21/20 1154    Narrative:       The following orders were created for panel order Clostridium Difficile Toxin - Stool, Per Rectum.  Procedure                               Abnormality         Status                     ---------                               -----------         ------                     Clostridium Difficile To...[[id number]]  Normal              Final result                 Please view results for these tests on the individual orders.    Clostridium Difficile Toxin, PCR - Stool, Per Rectum [[id number]]  (Normal) Collected:  08/21/20 1045    Lab Status:  Final result Specimen:  Stool from Per Rectum Updated:  08/21/20 1154     C. Difficile Toxins by PCR Not Detected    Narrative:       Performance characteristics of test not established for patients <2 years of age.  Negative for Toxigenic C. Difficile          Imaging Results (All)     Procedure Component Value Units Date/Time    CT Abdomen Pelvis Without Contrast [[id number]] Collected:  08/20/20 0327     Updated:  08/20/20 0329    Narrative:       CT Abdomen Pelvis WO    INDICATION:   Abdominal pain all over for 3 days    TECHNIQUE:   CT of the abdomen and pelvis without IV contrast. Coronal and sagittal reconstructions were obtained.  Radiation dose reduction techniques included automated exposure control or exposure modulation based on body size. Count of known CT and cardiac nuc  med studies performed in previous 12 months: 0.     COMPARISON:   None available.    FINDINGS:  Abdomen: Lung bases are clear the liver, gallbladder, spleen, pancreas, adrenal glands and right kidney normal.    The left kidney has an oval lesion in the midportion of the kidney. This is just anterior to the renal parenchyma. It measures about 17 mm maximum dimension and is partially calcified. This  is likely a complex cyst. The aneurysm is less likely.    The aorta is normal in size. There is no adenopathy. The bowel shows numerous left colon diverticuli. There seems be some mild inflammatory stranding in the left side of the pelvis. I cannot clearly identify any site of diverticulitis there is no colon  wall thickening identified.    Pelvis: Bladder is normal. The uterus is been removed. There are no adnexal masses. Bones are unremarkable.      Impression:       Partially calcified 17 mm left renal lesion. This could represent a complex cyst, solid mass within the aneurysm. Follow-up as an outpatient with multiphase renal CT scan is recommended.    Numerous colonic diverticuli    This seems be some subtle inflammatory change in the left side of the pelvis. This is best appreciated on image 72. This could possibly represent focal colitis or diverticulitis.          Signer Name: Javier Lima MD   Signed: 8/20/2020 3:27 AM   Workstation Name: [email or web address]    [facility]            Discharge Details        Discharge Medications      Continue These Medications      Instructions Start Date   acetaminophen 325 MG tablet  Commonly known as:  TYLENOL   650 mg, Oral, Every 6 Hours PRN      ALPRAZolam 0.25 MG tablet  Commonly known as:  XANAX   0.25 mg, Oral, 3 Times Daily PRN      eszopiclone 3 MG tablet  Commonly known as:  LUNESTA   3 mg, Oral, Nightly PRN      FLUoxetine 20 MG capsule  Commonly known as:  PROzac   20 mg, Oral, Daily      ibuprofen 200 MG tablet  Commonly known as:  ADVIL,MOTRIN   200 mg, Oral, Every 6 Hours PRN      lisinopril-hydrochlorothiazide 20-25 MG per tablet  Commonly known as:  PRINZIDE,ZESTORETIC   1 tablet, Oral, Every Morning      omeprazole 20 MG capsule  Commonly known as:  priLOSEC   20 mg, Oral, Daily      ondansetron 4 MG tablet  Commonly known as:  ZOFRAN   4 mg, Oral, Every 6 Hours PRN      rosuvastatin 40 MG tablet  Commonly known as:  CRESTOR   40 mg, Oral,  Daily      valACYclovir 1000 MG tablet  Commonly known as:  VALTREX   2,000 mg, Oral, 2 Times Daily PRN             Allergies   Allergen Reactions   • Codeine Dizziness and Delirium     Feels drunk   • Percocet [Oxycodone-Acetaminophen] Itching   • Adhesive Tape Dermatitis         Discharge Disposition:  Home or Self Care    Diet:  Hospital:  Diet Order   Procedures   • Diet Regular; Thin; Consistent Carbohydrate       Activity:  Activity Instructions     Activity as Tolerated          CODE STATUS:    Code Status and Medical Interventions:   Ordered at: 08/20/20 0601     Level Of Support Discussed With:    Patient     Code Status:    CPR     Medical Interventions (Level of Support Prior to Arrest):    Full       No future appointments.    Additional Instructions for the Follow-ups that You Need to Schedule     Discharge Follow-up with PCP   As directed       Currently Documented PCP:    Charu Jordan MD    PCP Phone Number:    [phone]     Follow Up Details:  follow up PCP Dr Jordan one week         Discharge Follow-up with Specified Provider: She will call Dr. Marmolejo office to schedule follow up   As directed      To:  She will call Dr. Marmolejo office to schedule follow up               Electronically signed by Clarence Skaggs MD, 08/25/20, 8:40 AM.      Time Spent on Discharge:  I spent  26  minutes on this discharge activity which included: face-to-face encounter with the patient, reviewing the data in the system, coordination of the care with the nursing staff as well as consultants, documentation, and entering orders.

## 2020-08-26 ENCOUNTER — READMISSION MANAGEMENT (OUTPATIENT)
Dept: CALL CENTER | Facility: HOSPITAL | Age: 55
End: 2020-08-26

## 2020-08-26 NOTE — OUTREACH NOTE
Prep Survey      Responses   [facility] patient discharged from?  [facility]   Is LACE score < 7 ?  No   Eligibility  Readm Mgmt   Discharge diagnosis  Sepsis w/o acute organ dysfunction, DM II, hx melanoma, HTN, DIverticulosis, Hypokalemia HERO   COVID-19 Test Status  Not tested   Does the patient have one of the following disease processes/diagnoses(primary or secondary)?  Sepsis   Does the patient have Home health ordered?  No   Is there a DME ordered?  No   Comments regarding appointments  Pt to schedule F/U appointment   Medication alerts for this patient  No changes   Prep survey completed?  Yes          Charu Kee, RN

## 2020-08-31 ENCOUNTER — TRANSCRIBE ORDERS (OUTPATIENT)
Dept: ADMINISTRATIVE | Facility: HOSPITAL | Age: 55
End: 2020-08-31

## 2020-09-01 ENCOUNTER — READMISSION MANAGEMENT (OUTPATIENT)
Dept: CALL CENTER | Facility: HOSPITAL | Age: 55
End: 2020-09-01

## 2020-09-01 NOTE — OUTREACH NOTE
Sepsis Week 1 Survey      Responses   [facility] patient discharged from?  [place]   COVID-19 Test Status  Not tested   Does the patient have one of the following disease processes/diagnoses(primary or secondary)?  Sepsis   Is there a successful TCM telephone encounter documented?  No   Week 1 attempt successful?  No   Unsuccessful attempts  Attempt 1          Jazmin Lee RN

## 2020-09-03 ENCOUNTER — READMISSION MANAGEMENT (OUTPATIENT)
Dept: CALL CENTER | Facility: HOSPITAL | Age: 55
End: 2020-09-03

## 2020-09-03 ENCOUNTER — TRANSCRIBE ORDERS (OUTPATIENT)
Dept: ADMINISTRATIVE | Facility: HOSPITAL | Age: 55
End: 2020-09-03

## 2020-09-03 DIAGNOSIS — N28.89 LEFT KIDNEY MASS: Primary | ICD-10-CM

## 2020-09-03 NOTE — OUTREACH NOTE
Sepsis Week 1 Survey      Responses   [facility] patient discharged from?  [facility]   COVID-19 Test Status  Not tested   Does the patient have one of the following disease processes/diagnoses(primary or secondary)?  Sepsis   Is there a successful TCM telephone encounter documented?  No   Week 1 attempt successful?  Yes   Call start time  1551   Call end time  1554   Discharge diagnosis  Sepsis w/o acute organ dysfunction, DM II, hx melanoma, HTN, DIverticulosis, Hypokalemia HERO   Meds reviewed with patient/caregiver?  Yes   Is the patient having any side effects they believe may be caused by any medication additions or changes?  No   Does the patient have all medications related to this admission filled (includes all antibiotics, inhalers, nebulizers,steroids,etc.)  N/A   Is the patient taking all medications as directed (includes completed medication regime)?  Yes   Does the patient have a primary care provider?   Yes   Comments regarding PCP  8/28/20,  9/1/20   Does the patient have an appointment with their PCP within 7 days of discharge?  Yes   Has the patient kept scheduled appointments due by today?  Yes   Pulse Ox monitoring  None   Psychosocial issues?  No   Did the patient receive a copy of their discharge instructions?  Yes   Nursing interventions  Reviewed instructions with patient   What is the patient's perception of their health status since discharge?  Improving   Nursing interventions  Nurse provided patient education   Is the patient/caregiver able to teach back Sepsis?  S - Shivering,fever or very cold, S - Sleepy, difficult to arouse,confused, S - Short of breath   Nursing interventions  Nurse provided patient education   Is patient/caregiver able to teach back steps to recovery at home?  Rest and regain strength, Eat a balanced diet   Is the patient/caregiver able to teach back signs and symptoms of worsening condition:  Fever, Hyperthermia, Shortness of breath/rapid respiratory rate,  Altered mental status(confusion/coma)   If the patient is a current smoker, are they able to teach back resources for cessation?  Smoking cessation medications, [id number] [Smoker]   Is the patient/caregiver able to teach back the hierarchy of who to call/visit for symptoms/problems? PCP, Specialist, Home health nurse, Urgent Care, ED, 911  Yes   Week 1 call completed?  Yes          Ankita Harris RN

## 2020-09-09 ENCOUNTER — HOSPITAL ENCOUNTER (OUTPATIENT)
Dept: CT IMAGING | Facility: HOSPITAL | Age: 55
Discharge: HOME | End: 2020-09-09
Admitting: FAMILY MEDICINE

## 2020-09-09 DIAGNOSIS — N28.89 LEFT KIDNEY MASS: ICD-10-CM

## 2020-09-09 PROCEDURE — 74170 CT ABD WO CNTRST FLWD CNTRST: CPT

## 2020-09-09 PROCEDURE — 0 IOPAMIDOL PER 1 ML: Performed by: FAMILY MEDICINE

## 2020-09-09 RX ADMIN — IOPAMIDOL 100 ML: 755 INJECTION, SOLUTION INTRAVENOUS at 16:17

## 2020-09-11 ENCOUNTER — LAB (OUTPATIENT)
Dept: LAB | Facility: HOSPITAL | Age: 55
End: 2020-09-11

## 2020-09-11 ENCOUNTER — OFFICE VISIT (OUTPATIENT)
Dept: GYNECOLOGIC ONCOLOGY | Facility: CLINIC | Age: 55
End: 2020-09-11

## 2020-09-11 ENCOUNTER — READMISSION MANAGEMENT (OUTPATIENT)
Dept: CALL CENTER | Facility: HOSPITAL | Age: 55
End: 2020-09-11

## 2020-09-11 VITALS
HEIGHT: 65 IN | WEIGHT: 160 LBS | SYSTOLIC BLOOD PRESSURE: 148 MMHG | HEART RATE: 88 BPM | DIASTOLIC BLOOD PRESSURE: 67 MMHG | OXYGEN SATURATION: 97 % | RESPIRATION RATE: 17 BRPM | BODY MASS INDEX: 26.66 KG/M2 | TEMPERATURE: 98.6 F

## 2020-09-11 DIAGNOSIS — T81.49XA WOUND INFECTION AFTER SURGERY: ICD-10-CM

## 2020-09-11 DIAGNOSIS — T81.49XA WOUND INFECTION AFTER SURGERY: Primary | ICD-10-CM

## 2020-09-11 LAB
ERYTHROCYTE [DISTWIDTH] IN BLOOD BY AUTOMATED COUNT: 13.2 % (ref 12.3–15.4)
HCT VFR BLD AUTO: 38.4 % (ref 34–46.6)
HGB BLD-MCNC: 12.8 G/DL (ref 12–15.9)
LYMPHOCYTES # BLD AUTO: 3 10*3/MM3 (ref 0.7–3.1)
LYMPHOCYTES NFR BLD AUTO: 29 % (ref 19.6–45.3)
MCH RBC QN AUTO: 30 PG (ref 26.6–33)
MCHC RBC AUTO-ENTMCNC: 33.3 G/DL (ref 31.5–35.7)
MCV RBC AUTO: 90.3 FL (ref 79–97)
MONOCYTES # BLD AUTO: 0.8 10*3/MM3 (ref 0.1–0.9)
MONOCYTES NFR BLD AUTO: 7.5 % (ref 5–12)
NEUTROPHILS NFR BLD AUTO: 6.5 10*3/MM3 (ref 1.7–7)
NEUTROPHILS NFR BLD AUTO: 63.5 % (ref 42.7–76)
PLATELET # BLD AUTO: 325 10*3/MM3 (ref 140–450)
PMV BLD AUTO: 7.9 FL (ref 6–12)
RBC # BLD AUTO: 4.26 10*6/MM3 (ref 3.77–5.28)
WBC # BLD AUTO: 10.2 10*3/MM3 (ref 3.4–10.8)

## 2020-09-11 PROCEDURE — 36415 COLL VENOUS BLD VENIPUNCTURE: CPT

## 2020-09-11 PROCEDURE — 85025 COMPLETE CBC W/AUTO DIFF WBC: CPT

## 2020-09-11 PROCEDURE — 99024 POSTOP FOLLOW-UP VISIT: CPT | Performed by: OBSTETRICS & GYNECOLOGY

## 2020-09-11 NOTE — OUTREACH NOTE
Sepsis Week 2 Survey      Responses   [facility] patient discharged from?  [place]   COVID-19 Test Status  Not tested   Does the patient have one of the following disease processes/diagnoses(primary or secondary)?  Sepsis   Week 2 attempt successful?  No   Unsuccessful attempts  Attempt 1          Margi Paul RN

## 2020-09-12 NOTE — PROGRESS NOTES
"Bhargavi Mohr  [id number]  1965      Reason for Visit: Postoperative evaluation    History of Present Illness:  Patient is a very pleasant 55 y.o. woman who presents for a post operative evaluation status post robotic assisted total laparoscopic hysterectomy, bilateral salpingo-oophorectomy performed on 8/3/2020.      Surgery and hospital course were uncomplicated.  Today, patient notes normal bowel and bladder function.  Her pain is well controlled.  She is concerned about right-sided abdominal incision.  She continues to have some pelvic pain and is concerned about this as she had pelvic pain with her colitis and postoperative hospital admission.  She has questions about resuming normal activities.   Unfortunately, patient was readmitted from 8/20/2020 to 8/25/2024 with a diagnosis of colitis.  She had had a history of this in the past.  Her symptoms improved with antibiotics and conservative management.  CT scan obtained showed no concerning findings related to the patient's surgical procedure.  Patient reports intermittent immediate fevers with her most recent being at the same date of her repeat CT scan performed for kidney lesion.    Past Medical History, Past Surgical History, Social History, Family History have been reviewed and are without significant changes except as mentioned.    Review of Systems   All other systems were reviewed and are negative except as mentioned above.    Medications:  The current medication list was reviewed in the EMR    ALLERGIES:    Allergies   Allergen Reactions   • Codeine Dizziness and Delirium     Feels drunk   • Percocet [Oxycodone-Acetaminophen] Itching   • Adhesive Tape Dermatitis           /67   Pulse 88   Temp 98.6 °F (37 °C)   Resp 17   Ht 165.1 cm (65\")   Wt 72.6 kg (160 lb)   SpO2 97%   BMI 26.63 kg/m²        Physical Exam  Constitutional:  Patient is a pleasant woman in no acute distress.  Gastrointestinal: Abdomen is soft and appropriately " tender.  There is no mass palpated.  There is no rebound or guarding.  Incision(s) is clean, dry and intact.  Right mid abdominal incision standard without any fullness appreciated.  No fullness was appreciated at any of the incisions.  Extremities:  Bilateral lower extremities are non-tender.  Gynecologic:External genitalia are free from lesion. On speculum examination, the vaginal cuff was intact and no lesions were appreciated.  More vaginal discharge than is typical was appreciated on speculum examination.  Silver nitrate was focally applied to the right mid cuff in the area with focal bleeding.  On bimanual examination, no fullness was appreciated.  Uterus, cervix and adnexa were absent.  There was no significant tenderness.  Rectovaginal exam was deferred.      PATHOLOGY:  Final Diagnosis   UTERUS, CERVIX, LEFT FALLOPIAN TUBE, AND LEFT OVARY, HYSTERECTOMY AND LEFT SALPINGO-OOPHORECTOMY:  Uterine Cervix: High grade squamous intraepithelial lesion (EDMUND-3, HSIL) present at 6-9 o'clock. Foci of LSIL are identified. Margins are uninvolved by dysplasia. No invasive carcinoma is identified (entire cervix submitted).   Uterine Corpus: Endometrial scarring with no residual endometrial glands identified on sections.  Left Fallopian Tube: Multiple minute paratubal cysts; otherwise no significant histopathology.  Left Ovary: No significant histopathology.     Ct Abdomen With & Without Contrast    Result Date: 9/10/2020  1. Partially calcified left renal lesion with surrounding renal cortical scarring measures 2.0 x 1.5 x 2.3 cm without definitive enhancement or soft tissue nodular enhancement associated likely minimally complex cystic lesion with calcifications. Bosniak criteria 2F classification with CT renal protocol and/or ultrasound considered for followup within 6 months to evaluate for stability. 2. Hepatic steatosis.  D:  09/10/2020 E:  09/10/2020  This report was finalized on 9/10/2020 5:07 PM by Dr. Nader So.       WBC   Date Value Ref Range Status   09/11/2020 10.20 3.40 - 10.80 10*3/mm3 Final     RBC   Date Value Ref Range Status   09/11/2020 4.26 3.77 - 5.28 10*6/mm3 Final     Hemoglobin   Date Value Ref Range Status   09/11/2020 12.8 12.0 - 15.9 g/dL Final     Hematocrit   Date Value Ref Range Status   09/11/2020 38.4 34.0 - 46.6 % Final     MCV   Date Value Ref Range Status   09/11/2020 90.3 79.0 - 97.0 fL Final     MCH   Date Value Ref Range Status   09/11/2020 30.0 26.6 - 33.0 pg Final     MCHC   Date Value Ref Range Status   09/11/2020 33.3 31.5 - 35.7 g/dL Final     RDW   Date Value Ref Range Status   09/11/2020 13.2 12.3 - 15.4 % Final     RDW-SD   Date Value Ref Range Status   08/25/2020 40.0 37.0 - 54.0 fl Final     MPV   Date Value Ref Range Status   09/11/2020 7.9 6.0 - 12.0 fL Final     Platelets   Date Value Ref Range Status   09/11/2020 325 140 - 450 10*3/mm3 Final     Neutrophil %   Date Value Ref Range Status   09/11/2020 63.5 42.7 - 76.0 % Final     Lymphocyte %   Date Value Ref Range Status   09/11/2020 29.0 19.6 - 45.3 % Final     Monocyte %   Date Value Ref Range Status   09/11/2020 7.5 5.0 - 12.0 % Final     Eosinophil %   Date Value Ref Range Status   08/23/2020 7.0 (H) 0.3 - 6.2 % Final     Basophil %   Date Value Ref Range Status   08/23/2020 0.6 0.0 - 1.5 % Final     Immature Grans %   Date Value Ref Range Status   08/23/2020 0.7 (H) 0.0 - 0.5 % Final     Neutrophils, Absolute   Date Value Ref Range Status   09/11/2020 6.50 1.70 - 7.00 10*3/mm3 Final     Lymphocytes, Absolute   Date Value Ref Range Status   09/11/2020 3.00 0.70 - 3.10 10*3/mm3 Final     Monocytes, Absolute   Date Value Ref Range Status   09/11/2020 0.80 0.10 - 0.90 10*3/mm3 Final     Eosinophils, Absolute   Date Value Ref Range Status   08/23/2020 0.71 (H) 0.00 - 0.40 10*3/mm3 Final     Basophils, Absolute   Date Value Ref Range Status   08/23/2020 0.06 0.00 - 0.20 10*3/mm3 Final     Immature Grans, Absolute   Date Value Ref  Range Status   08/23/2020 0.07 (H) 0.00 - 0.05 10*3/mm3 Final     [facility]   Date Value Ref Range Status   08/23/2020 0.0 0.0 - 0.2 /100 WBC Final       ASSESSMENT/PLAN:  Bhargavi Mohr returns for a post-operative evaluation today.  All pathology reports were discussed with the patient.  I reviewed the CT of abdomen results with the patient.  Recommendation for follow-up in 6 months was discussed.  I reviewed the previous CT scan of abdomen and pelvis and no fullness was noted in the pelvis at the surgical field suggestive of abscess or infection.  Patient's last CBC had a mildly elevated white cell count.  Repeat CBC today given complaints of fever and increased vaginal discharge noted on examination.  We discussed the need for ongoing Pap smears given high-grade dysplasia noted at the time of hysterectomy.  We did discuss negative margins as well.      Overall, the patient is very pleased with her care.  I recommended continuation of post operative precautions as discussed.     She is to follow-up in 2 weeks for repeat evaluation.     Ne Marmolejo MD  09/11/20  9:25 PM

## 2020-09-16 ENCOUNTER — READMISSION MANAGEMENT (OUTPATIENT)
Dept: CALL CENTER | Facility: HOSPITAL | Age: 55
End: 2020-09-16

## 2020-09-16 NOTE — OUTREACH NOTE
Sepsis Week 2 Survey      Responses   [facility] patient discharged from?  [place]   COVID-19 Test Status  Not tested   Does the patient have one of the following disease processes/diagnoses(primary or secondary)?  Sepsis   Week 2 attempt successful?  Yes   Call start time  1842   Call end time  1847   Discharge diagnosis  Sepsis w/o acute organ dysfunction, DM II, hx melanoma, HTN, DIverticulosis, Hypokalemia HERO   Meds reviewed with patient/caregiver?  Yes   Is the patient having any side effects they believe may be caused by any medication additions or changes?  No   Does the patient have all medications related to this admission filled (includes all antibiotics, inhalers, nebulizers,steroids,etc.)  Yes   Is the patient taking all medications as directed (includes completed medication regime)?  Yes   Does the patient have a primary care provider?   Yes   Does the patient have an appointment with their PCP within 7 days of discharge?  Yes   Has the patient kept scheduled appointments due by today?  Yes   Pulse Ox monitoring  None   Did the patient receive a copy of their discharge instructions?  Yes   Nursing interventions  Reviewed instructions with patient   What is the patient's perception of their health status since discharge?  Improving   Nursing interventions  Nurse provided patient education   Is the patient/caregiver able to teach back Sepsis?  S - Shivering,fever or very cold, E - Extreme pain or generalized discomfort (worst ever,especially abdomen), P - Pale or discolored skin, S - Short of breath   Nursing interventions  Nurse provided reassurance to patient   Is patient/caregiver able to teach back steps to recovery at home?  Set small, achievable goals for return to baseline health, Rest and regain strength, Eat a balanced diet, Make a list of questions for PCP appoinment   Is the patient/caregiver able to teach back signs and symptoms of worsening condition:  Fever, Edema, Shortness of  breath/rapid respiratory rate   If the patient is a current smoker, are they able to teach back resources for cessation?  Smoking cessation medications, [id number]   Is the patient/caregiver able to teach back the hierarchy of who to call/visit for symptoms/problems? PCP, Specialist, Home health nurse, Urgent Care, ED, 911  Yes   Additional teach back comments  Encouraged reducing by one cigarette per day till stopped.  She is doing better she says.   Week 2 call completed?  Yes   Wrap up additional comments  Improving.          Aarti Wheeler RN

## 2020-09-24 NOTE — PROGRESS NOTES
"Bhargavi Mohr  [id number]  1965      Reason for Visit: Postoperative evaluation, vaginal discharge    History of Present Illness:  Patient is a very pleasant 55 y.o. woman who presents for a post operative evaluation status post robotic assisted total laparoscopic hysterectomy, bilateral salpingo-oophorectomy performed on 8/3/2020.      Surgery and hospital course were uncomplicated.  Today, patient notes normal bowel and bladder function.  Her pain is well controlled.  She is concerned about right-sided abdominal incision.  She continues to have some pelvic pain and is concerned about this as she had pelvic pain with her colitis and postoperative hospital admission.  She has questions about resuming normal activities.   Unfortunately, patient was readmitted from 8/20/2020 to 8/25/2024 with a diagnosis of colitis.  She had had a history of this in the past.  Her symptoms improved with antibiotics and conservative management.  CT scan obtained showed no concerning findings related to the patient's surgical procedure.  Patient reports intermittent immediate fevers with her most recent being at the same date of her repeat CT scan performed for kidney lesion.  She was seen 2 weeks ago and had heavy vaginal discharge at that time.  Returns for evaluation.    Past Medical History, Past Surgical History, Social History, Family History have been reviewed and are without significant changes except as mentioned.    Review of Systems   All other systems were reviewed and are negative except as mentioned above.    Medications:  The current medication list was reviewed in the EMR    ALLERGIES:    Allergies   Allergen Reactions   • Codeine Dizziness and Delirium     Feels drunk   • Percocet [Oxycodone-Acetaminophen] Itching   • Adhesive Tape Dermatitis           /68   Pulse 84   Temp 98.6 °F (37 °C) (Temporal)   Resp 16   Ht 165.1 cm (65\")   Wt 71.2 kg (157 lb)   SpO2 96%   BMI 26.13 kg/m²        Physical " Exam  Constitutional:  Patient is a pleasant woman in no acute distress.  Gastrointestinal: Abdomen is soft and appropriately tender.  There is no mass palpated.  There is no rebound or guarding.  Incision(s) is clean, dry and intact.  Right mid abdominal incision standard without any fullness appreciated.  No fullness was appreciated at any of the incisions.  Extremities:  Bilateral lower extremities are non-tender.  Gynecologic:External genitalia are free from lesion. On speculum examination, the vaginal cuff was intact and there was a small area of granulation tissue in the middle of the cuff.  This was treated with silver nitrate.  Vaginal discharge was minimal.  On bimanual examination, no fullness was appreciated.  Uterus, cervix and adnexa were absent.  There was no significant tenderness.  Rectovaginal exam was deferred.      PATHOLOGY:  Final Diagnosis   UTERUS, CERVIX, LEFT FALLOPIAN TUBE, AND LEFT OVARY, HYSTERECTOMY AND LEFT SALPINGO-OOPHORECTOMY:  Uterine Cervix: High grade squamous intraepithelial lesion (EDMUND-3, HSIL) present at 6-9 o'clock. Foci of LSIL are identified. Margins are uninvolved by dysplasia. No invasive carcinoma is identified (entire cervix submitted).   Uterine Corpus: Endometrial scarring with no residual endometrial glands identified on sections.  Left Fallopian Tube: Multiple minute paratubal cysts; otherwise no significant histopathology.  Left Ovary: No significant histopathology.     No radiology results for the last 7 days  WBC   Date Value Ref Range Status   09/11/2020 10.20 3.40 - 10.80 10*3/mm3 Final     RBC   Date Value Ref Range Status   09/11/2020 4.26 3.77 - 5.28 10*6/mm3 Final     Hemoglobin   Date Value Ref Range Status   09/11/2020 12.8 12.0 - 15.9 g/dL Final     Hematocrit   Date Value Ref Range Status   09/11/2020 38.4 34.0 - 46.6 % Final     MCV   Date Value Ref Range Status   09/11/2020 90.3 79.0 - 97.0 fL Final     MCH   Date Value Ref Range Status   09/11/2020  30.0 26.6 - 33.0 pg Final     MCHC   Date Value Ref Range Status   09/11/2020 33.3 31.5 - 35.7 g/dL Final     RDW   Date Value Ref Range Status   09/11/2020 13.2 12.3 - 15.4 % Final     RDW-SD   Date Value Ref Range Status   08/25/2020 40.0 37.0 - 54.0 fl Final     MPV   Date Value Ref Range Status   09/11/2020 7.9 6.0 - 12.0 fL Final     Platelets   Date Value Ref Range Status   09/11/2020 325 140 - 450 10*3/mm3 Final     Neutrophil %   Date Value Ref Range Status   09/11/2020 63.5 42.7 - 76.0 % Final     Lymphocyte %   Date Value Ref Range Status   09/11/2020 29.0 19.6 - 45.3 % Final     Monocyte %   Date Value Ref Range Status   09/11/2020 7.5 5.0 - 12.0 % Final     Eosinophil %   Date Value Ref Range Status   08/23/2020 7.0 (H) 0.3 - 6.2 % Final     Basophil %   Date Value Ref Range Status   08/23/2020 0.6 0.0 - 1.5 % Final     Immature Grans %   Date Value Ref Range Status   08/23/2020 0.7 (H) 0.0 - 0.5 % Final     Neutrophils, Absolute   Date Value Ref Range Status   09/11/2020 6.50 1.70 - 7.00 10*3/mm3 Final     Lymphocytes, Absolute   Date Value Ref Range Status   09/11/2020 3.00 0.70 - 3.10 10*3/mm3 Final     Monocytes, Absolute   Date Value Ref Range Status   09/11/2020 0.80 0.10 - 0.90 10*3/mm3 Final     Eosinophils, Absolute   Date Value Ref Range Status   08/23/2020 0.71 (H) 0.00 - 0.40 10*3/mm3 Final     Basophils, Absolute   Date Value Ref Range Status   08/23/2020 0.06 0.00 - 0.20 10*3/mm3 Final     Immature Grans, Absolute   Date Value Ref Range Status   08/23/2020 0.07 (H) 0.00 - 0.05 10*3/mm3 Final     nRBC   Date Value Ref Range Status   08/23/2020 0.0 0.0 - 0.2 /100 WBC Final       ASSESSMENT/PLAN:  Bhargavi Mohr returns for a post-operative evaluation today.  All pathology reports were discussed with the patient.  Vaginal discharge has improved.  Area of granulation to on the cuff was treated with silver nitrate.      Overall, the patient is very pleased with her care.  I recommended  continuation of post operative precautions as discussed.     She is to follow-up as needed.    Patient was seen and examined with Dr. Salazar,  resident, who performed portions of the examination and documentation for this patient's care under my direct supervision.  I agree with the above documentation and plan.    Ne Marmolejo MD  09/27/20  13:26 EDT

## 2020-09-25 ENCOUNTER — READMISSION MANAGEMENT (OUTPATIENT)
Dept: CALL CENTER | Facility: HOSPITAL | Age: 55
End: 2020-09-25

## 2020-09-25 ENCOUNTER — OFFICE VISIT (OUTPATIENT)
Dept: GYNECOLOGIC ONCOLOGY | Facility: CLINIC | Age: 55
End: 2020-09-25

## 2020-09-25 VITALS
OXYGEN SATURATION: 96 % | SYSTOLIC BLOOD PRESSURE: 134 MMHG | DIASTOLIC BLOOD PRESSURE: 68 MMHG | HEART RATE: 84 BPM | WEIGHT: 157 LBS | BODY MASS INDEX: 26.16 KG/M2 | RESPIRATION RATE: 16 BRPM | TEMPERATURE: 98.6 F | HEIGHT: 65 IN

## 2020-09-25 DIAGNOSIS — Z98.890 POST-OPERATIVE STATE: Primary | ICD-10-CM

## 2020-09-25 PROCEDURE — 99024 POSTOP FOLLOW-UP VISIT: CPT | Performed by: OBSTETRICS & GYNECOLOGY

## 2020-09-25 NOTE — OUTREACH NOTE
Sepsis Week 3 Survey      Responses   [facility] patient discharged from?  [place]   COVID-19 Test Status  Not tested   Does the patient have one of the following disease processes/diagnoses(primary or secondary)?  Sepsis   Week 3 attempt successful?  Yes   Call start time  1701   Call end time  1702   Discharge diagnosis  Sepsis w/o acute organ dysfunction, DM II, hx melanoma, HTN, DIverticulosis, Hypokalemia HERO   Is the patient taking all medications as directed (includes completed medication regime)?  Yes   Has the patient kept scheduled appointments due by today?  Yes   Pulse Ox monitoring  None   Psychosocial issues?  No   What is the patient's perception of their health status since discharge?  Improving   Nursing interventions  Nurse provided patient education   Is the patient/caregiver able to teach back Sepsis?  S - Shivering,fever or very cold, E - Extreme pain or generalized discomfort (worst ever,especially abdomen), P - Pale or discolored skin, S - Sleepy, difficult to arouse,confused, S - Short of breath, I -   I feel like I might die-a feeling of hopelessness   Is the patient/caregiver able to teach back the hierarchy of who to call/visit for symptoms/problems? PCP, Specialist, Home health nurse, Urgent Care, ED, 911  Yes   Week 3 call completed?  Yes   Wrap up additional comments  Patient says she is doing great, she saw Dr. Marmolejo today, no questions or concerns at this time.          Lorena Arango RN

## 2020-10-02 ENCOUNTER — READMISSION MANAGEMENT (OUTPATIENT)
Dept: CALL CENTER | Facility: HOSPITAL | Age: 55
End: 2020-10-02

## 2020-10-02 NOTE — OUTREACH NOTE
Sepsis Week 4 Survey      Responses   [facility] patient discharged from?  [place]   Does the patient have one of the following disease processes/diagnoses(primary or secondary)?  Sepsis   Week 4 attempt successful?  Yes   Call start time  1704   Call end time  1706   Discharge diagnosis  Sepsis w/o acute organ dysfunction, DM II, hx melanoma, HTN, DIverticulosis, Hypokalemia HERO   Meds reviewed with patient/caregiver?  Yes   Is the patient having any side effects they believe may be caused by any medication additions or changes?  No   Is the patient taking all medications as directed (includes completed medication regime)?  Yes   Has the patient kept scheduled appointments due by today?  Yes   Is the patient still receiving Home Health Services?  N/A   Psychosocial issues?  No   What is the patient's perception of their health status since discharge?  Improving   Nursing interventions  Nurse provided patient education   Is the patient/caregiver able to teach back Sepsis?  S - Shivering,fever or very cold, E - Extreme pain or generalized discomfort (worst ever,especially abdomen), P - Pale or discolored skin, S - Sleepy, difficult to arouse,confused, S - Short of breath, I -   I feel like I might die-a feeling of hopelessness   Nursing interventions  Nurse provided reassurance to patient   Is patient/caregiver able to teach back steps to recovery at home?  Set small, achievable goals for return to baseline health, Rest and regain strength, Eat a balanced diet, Make a list of questions for PCP appoinment   Is the patient/caregiver able to teach back signs and symptoms of worsening condition:  Fever, Edema, Shortness of breath/rapid respiratory rate   If the patient is a current smoker, are they able to teach back resources for cessation?  Smoking cessation medications, [id number]   Is the patient/caregiver able to teach back the hierarchy of who to call/visit for symptoms/problems? PCP, Specialist, Home  health nurse, Urgent Care, ED, 911  Yes   Week 4 call completed?  Yes   Would the patient like one additional call?  No   Graduated  Yes          Arvind Means RN

## 2020-10-09 PROCEDURE — 87088 URINE BACTERIA CULTURE: CPT | Performed by: PHYSICIAN ASSISTANT

## 2020-10-09 PROCEDURE — 87186 SC STD MICRODIL/AGAR DIL: CPT | Performed by: PHYSICIAN ASSISTANT

## 2020-10-09 PROCEDURE — 87086 URINE CULTURE/COLONY COUNT: CPT | Performed by: PHYSICIAN ASSISTANT

## 2020-10-11 ENCOUNTER — TELEPHONE (OUTPATIENT)
Dept: URGENT CARE | Facility: CLINIC | Age: 55
End: 2020-10-11

## 2021-01-15 ENCOUNTER — TRANSCRIBE ORDERS (OUTPATIENT)
Dept: ADMINISTRATIVE | Facility: HOSPITAL | Age: 56
End: 2021-01-15

## 2021-01-15 DIAGNOSIS — Z12.31 VISIT FOR SCREENING MAMMOGRAM: Primary | ICD-10-CM

## 2021-02-10 ENCOUNTER — TRANSCRIBE ORDERS (OUTPATIENT)
Dept: ADMINISTRATIVE | Facility: HOSPITAL | Age: 56
End: 2021-02-10

## 2021-02-10 DIAGNOSIS — N28.1 COMPLEX RENAL CYST: Primary | ICD-10-CM

## 2021-02-25 ENCOUNTER — HOSPITAL ENCOUNTER (OUTPATIENT)
Dept: ULTRASOUND IMAGING | Facility: HOSPITAL | Age: 56
Discharge: HOME | End: 2021-02-25
Admitting: FAMILY MEDICINE

## 2021-02-25 DIAGNOSIS — N28.1 COMPLEX RENAL CYST: ICD-10-CM

## 2021-02-25 PROCEDURE — 76775 US EXAM ABDO BACK WALL LIM: CPT

## 2021-03-22 ENCOUNTER — HOSPITAL ENCOUNTER (OUTPATIENT)
Dept: MAMMOGRAPHY | Facility: HOSPITAL | Age: 56
Discharge: HOME | End: 2021-03-22
Admitting: NURSE PRACTITIONER

## 2021-03-22 DIAGNOSIS — Z12.31 VISIT FOR SCREENING MAMMOGRAM: ICD-10-CM

## 2021-03-22 PROCEDURE — 77067 SCR MAMMO BI INCL CAD: CPT

## 2021-03-22 PROCEDURE — 77063 BREAST TOMOSYNTHESIS BI: CPT | Performed by: RADIOLOGY

## 2021-03-22 PROCEDURE — 77063 BREAST TOMOSYNTHESIS BI: CPT

## 2021-03-22 PROCEDURE — 77067 SCR MAMMO BI INCL CAD: CPT | Performed by: RADIOLOGY

## 2022-07-02 ENCOUNTER — APPOINTMENT (OUTPATIENT)
Dept: CT IMAGING | Facility: HOSPITAL | Age: 57
End: 2022-07-02

## 2022-07-02 ENCOUNTER — HOSPITAL ENCOUNTER (OUTPATIENT)
Facility: HOSPITAL | Age: 57
Discharge: HOME | End: 2022-07-04
Attending: EMERGENCY MEDICINE | Admitting: INTERNAL MEDICINE

## 2022-07-02 DIAGNOSIS — K62.5 RECTAL BLEEDING: Primary | ICD-10-CM

## 2022-07-02 DIAGNOSIS — D64.9 ANEMIA, UNSPECIFIED TYPE: ICD-10-CM

## 2022-07-02 PROBLEM — K92.2 GI BLEED: Status: ACTIVE | Noted: 2022-07-02

## 2022-07-02 PROBLEM — E03.9 HYPOTHYROIDISM (ACQUIRED): Status: ACTIVE | Noted: 2022-07-02

## 2022-07-02 PROBLEM — K92.2 GI BLEED: Chronic | Status: ACTIVE | Noted: 2022-07-02

## 2022-07-02 PROBLEM — E78.5 HLD (HYPERLIPIDEMIA): Status: ACTIVE | Noted: 2022-07-02

## 2022-07-02 LAB
ABO GROUP BLD: NORMAL
ALBUMIN SERPL-MCNC: 3.8 G/DL (ref 3.5–5.2)
ALBUMIN/GLOB SERPL: 1.4 G/DL
ALP SERPL-CCNC: 70 U/L (ref 39–117)
ALT SERPL W P-5'-P-CCNC: 17 U/L (ref 1–33)
ANION GAP SERPL CALCULATED.3IONS-SCNC: 9 MMOL/L (ref 5–15)
AST SERPL-CCNC: 17 U/L (ref 1–32)
BASOPHILS # BLD AUTO: 0.05 10*3/MM3 (ref 0–0.2)
BASOPHILS NFR BLD AUTO: 0.6 % (ref 0–1.5)
BILIRUB SERPL-MCNC: <0.2 MG/DL (ref 0–1.2)
BLD GP AB SCN SERPL QL: NEGATIVE
BUN SERPL-MCNC: 17 MG/DL (ref 6–20)
BUN/CREAT SERPL: 24.6 (ref 7–25)
CALCIUM SPEC-SCNC: 9.2 MG/DL (ref 8.6–10.5)
CHLORIDE SERPL-SCNC: 106 MMOL/L (ref 98–107)
CO2 SERPL-SCNC: 27 MMOL/L (ref 22–29)
CREAT SERPL-MCNC: 0.69 MG/DL (ref 0.57–1)
D-LACTATE SERPL-SCNC: 1.1 MMOL/L (ref 0.5–2)
DEPRECATED RDW RBC AUTO: 45.6 FL (ref 37–54)
EGFRCR SERPLBLD CKD-EPI 2021: 102.6 ML/MIN/1.73
EOSINOPHIL # BLD AUTO: 0.26 10*3/MM3 (ref 0–0.4)
EOSINOPHIL NFR BLD AUTO: 2.9 % (ref 0.3–6.2)
ERYTHROCYTE [DISTWIDTH] IN BLOOD BY AUTOMATED COUNT: 13.2 % (ref 12.3–15.4)
ERYTHROCYTE [SEDIMENTATION RATE] IN BLOOD: 5 MM/HR (ref 0–30)
FLUAV SUBTYP SPEC NAA+PROBE: NOT DETECTED
FLUBV RNA ISLT QL NAA+PROBE: NOT DETECTED
GLOBULIN UR ELPH-MCNC: 2.7 GM/DL
GLUCOSE BLDC GLUCOMTR-MCNC: 101 MG/DL (ref 70–130)
GLUCOSE BLDC GLUCOMTR-MCNC: 112 MG/DL (ref 70–130)
GLUCOSE SERPL-MCNC: 93 MG/DL (ref 65–99)
HCT VFR BLD AUTO: 31 % (ref 34–46.6)
HGB BLD-MCNC: 9.8 G/DL (ref 12–15.9)
HOLD SPECIMEN: NORMAL
IMM GRANULOCYTES # BLD AUTO: 0.05 10*3/MM3 (ref 0–0.05)
IMM GRANULOCYTES NFR BLD AUTO: 0.6 % (ref 0–0.5)
LYMPHOCYTES # BLD AUTO: 2.87 10*3/MM3 (ref 0.7–3.1)
LYMPHOCYTES NFR BLD AUTO: 32.2 % (ref 19.6–45.3)
MCH RBC QN AUTO: 29.9 PG (ref 26.6–33)
MCHC RBC AUTO-ENTMCNC: 31.6 G/DL (ref 31.5–35.7)
MCV RBC AUTO: 94.5 FL (ref 79–97)
MONOCYTES # BLD AUTO: 0.55 10*3/MM3 (ref 0.1–0.9)
MONOCYTES NFR BLD AUTO: 6.2 % (ref 5–12)
NEUTROPHILS NFR BLD AUTO: 5.12 10*3/MM3 (ref 1.7–7)
NEUTROPHILS NFR BLD AUTO: 57.5 % (ref 42.7–76)
NRBC BLD AUTO-RTO: 0 /100 WBC (ref 0–0.2)
PLATELET # BLD AUTO: 251 10*3/MM3 (ref 140–450)
PMV BLD AUTO: 10.1 FL (ref 6–12)
POTASSIUM SERPL-SCNC: 4.3 MMOL/L (ref 3.5–5.2)
PROCALCITONIN SERPL-MCNC: 0.03 NG/ML (ref 0–0.25)
PROT SERPL-MCNC: 6.5 G/DL (ref 6–8.5)
RBC # BLD AUTO: 3.28 10*6/MM3 (ref 3.77–5.28)
RH BLD: POSITIVE
SARS-COV-2 RNA PNL SPEC NAA+PROBE: NOT DETECTED
SODIUM SERPL-SCNC: 142 MMOL/L (ref 136–145)
T&S EXPIRATION DATE: NORMAL
WBC NRBC COR # BLD: 8.9 10*3/MM3 (ref 3.4–10.8)
WHOLE BLOOD HOLD COAG: NORMAL
WHOLE BLOOD HOLD SPECIMEN: NORMAL

## 2022-07-02 PROCEDURE — 0 IOPAMIDOL PER 1 ML: Performed by: EMERGENCY MEDICINE

## 2022-07-02 PROCEDURE — G0378 HOSPITAL OBSERVATION PER HR: HCPCS

## 2022-07-02 PROCEDURE — 86850 RBC ANTIBODY SCREEN: CPT | Performed by: EMERGENCY MEDICINE

## 2022-07-02 PROCEDURE — 87636 SARSCOV2 & INF A&B AMP PRB: CPT | Performed by: INTERNAL MEDICINE

## 2022-07-02 PROCEDURE — 99284 EMERGENCY DEPT VISIT MOD MDM: CPT

## 2022-07-02 PROCEDURE — 99220 PR INITIAL OBSERVATION CARE/DAY 70 MINUTES: CPT

## 2022-07-02 PROCEDURE — 82962 GLUCOSE BLOOD TEST: CPT

## 2022-07-02 PROCEDURE — 83605 ASSAY OF LACTIC ACID: CPT | Performed by: EMERGENCY MEDICINE

## 2022-07-02 PROCEDURE — 80053 COMPREHEN METABOLIC PANEL: CPT | Performed by: EMERGENCY MEDICINE

## 2022-07-02 PROCEDURE — 85652 RBC SED RATE AUTOMATED: CPT

## 2022-07-02 PROCEDURE — 84145 PROCALCITONIN (PCT): CPT

## 2022-07-02 PROCEDURE — 85025 COMPLETE CBC W/AUTO DIFF WBC: CPT | Performed by: EMERGENCY MEDICINE

## 2022-07-02 PROCEDURE — 74178 CT ABD&PLV WO CNTR FLWD CNTR: CPT

## 2022-07-02 PROCEDURE — 86900 BLOOD TYPING SEROLOGIC ABO: CPT | Performed by: EMERGENCY MEDICINE

## 2022-07-02 PROCEDURE — C9803 HOPD COVID-19 SPEC COLLECT: HCPCS

## 2022-07-02 PROCEDURE — 85014 HEMATOCRIT: CPT

## 2022-07-02 PROCEDURE — 85018 HEMOGLOBIN: CPT

## 2022-07-02 PROCEDURE — 86901 BLOOD TYPING SEROLOGIC RH(D): CPT | Performed by: EMERGENCY MEDICINE

## 2022-07-02 RX ORDER — LISINOPRIL 40 MG/1
40 TABLET ORAL DAILY
COMMUNITY

## 2022-07-02 RX ORDER — INSULIN LISPRO 100 [IU]/ML
0-7 INJECTION, SOLUTION INTRAVENOUS; SUBCUTANEOUS EVERY 6 HOURS
Status: DISCONTINUED | OUTPATIENT
Start: 2022-07-02 | End: 2022-07-04 | Stop reason: HOSPADM

## 2022-07-02 RX ORDER — LEVOTHYROXINE SODIUM 0.03 MG/1
25 TABLET ORAL DAILY
COMMUNITY

## 2022-07-02 RX ORDER — SODIUM CHLORIDE 0.9 % (FLUSH) 0.9 %
10 SYRINGE (ML) INJECTION AS NEEDED
Status: DISCONTINUED | OUTPATIENT
Start: 2022-07-02 | End: 2022-07-04 | Stop reason: HOSPADM

## 2022-07-02 RX ORDER — DEXTROSE MONOHYDRATE 25 G/50ML
25 INJECTION, SOLUTION INTRAVENOUS
Status: DISCONTINUED | OUTPATIENT
Start: 2022-07-02 | End: 2022-07-04 | Stop reason: HOSPADM

## 2022-07-02 RX ORDER — SODIUM CHLORIDE, SODIUM LACTATE, POTASSIUM CHLORIDE, CALCIUM CHLORIDE 600; 310; 30; 20 MG/100ML; MG/100ML; MG/100ML; MG/100ML
100 INJECTION, SOLUTION INTRAVENOUS CONTINUOUS
Status: ACTIVE | OUTPATIENT
Start: 2022-07-03 | End: 2022-07-03

## 2022-07-02 RX ORDER — ACETAMINOPHEN 160 MG/5ML
650 SOLUTION ORAL EVERY 4 HOURS PRN
Status: DISCONTINUED | OUTPATIENT
Start: 2022-07-02 | End: 2022-07-04 | Stop reason: HOSPADM

## 2022-07-02 RX ORDER — ACETAMINOPHEN 650 MG/1
650 SUPPOSITORY RECTAL EVERY 4 HOURS PRN
Status: DISCONTINUED | OUTPATIENT
Start: 2022-07-02 | End: 2022-07-04 | Stop reason: HOSPADM

## 2022-07-02 RX ORDER — SODIUM CHLORIDE 0.9 % (FLUSH) 0.9 %
10 SYRINGE (ML) INJECTION EVERY 12 HOURS SCHEDULED
Status: DISCONTINUED | OUTPATIENT
Start: 2022-07-02 | End: 2022-07-04 | Stop reason: HOSPADM

## 2022-07-02 RX ORDER — ACETAMINOPHEN 325 MG/1
650 TABLET ORAL EVERY 4 HOURS PRN
Status: DISCONTINUED | OUTPATIENT
Start: 2022-07-02 | End: 2022-07-04 | Stop reason: HOSPADM

## 2022-07-02 RX ORDER — PEG-3350, SODIUM SULFATE, SODIUM CHLORIDE, POTASSIUM CHLORIDE, SODIUM ASCORBATE AND ASCORBIC ACID 7.5-2.691G
1000 KIT ORAL EVERY 12 HOURS SCHEDULED
Status: COMPLETED | OUTPATIENT
Start: 2022-07-02 | End: 2022-07-03

## 2022-07-02 RX ORDER — NICOTINE POLACRILEX 4 MG
15 LOZENGE BUCCAL
Status: DISCONTINUED | OUTPATIENT
Start: 2022-07-02 | End: 2022-07-04 | Stop reason: HOSPADM

## 2022-07-02 RX ORDER — LEVOTHYROXINE SODIUM 0.03 MG/1
25 TABLET ORAL
Status: DISCONTINUED | OUTPATIENT
Start: 2022-07-03 | End: 2022-07-04 | Stop reason: HOSPADM

## 2022-07-02 RX ORDER — ROSUVASTATIN CALCIUM 20 MG/1
40 TABLET, COATED ORAL DAILY
Status: DISCONTINUED | OUTPATIENT
Start: 2022-07-02 | End: 2022-07-04 | Stop reason: HOSPADM

## 2022-07-02 RX ORDER — FLUOXETINE HYDROCHLORIDE 20 MG/1
20 CAPSULE ORAL DAILY
Status: DISCONTINUED | OUTPATIENT
Start: 2022-07-03 | End: 2022-07-04 | Stop reason: HOSPADM

## 2022-07-02 RX ADMIN — SODIUM CHLORIDE 1000 ML: 9 INJECTION, SOLUTION INTRAVENOUS at 17:12

## 2022-07-02 RX ADMIN — ROSUVASTATIN 40 MG: 20 TABLET, FILM COATED ORAL at 20:32

## 2022-07-02 RX ADMIN — Medication 10 ML: at 20:32

## 2022-07-02 RX ADMIN — SODIUM CHLORIDE, POTASSIUM CHLORIDE, SODIUM LACTATE AND CALCIUM CHLORIDE 100 ML/HR: 600; 310; 30; 20 INJECTION, SOLUTION INTRAVENOUS at 23:36

## 2022-07-02 RX ADMIN — IOPAMIDOL 100 ML: 755 INJECTION, SOLUTION INTRAVENOUS at 16:56

## 2022-07-02 RX ADMIN — POLYETHYLENE GLYCOL 3350, SODIUM SULFATE, SODIUM CHLORIDE, POTASSIUM CHLORIDE, SODIUM ASCORBATE, AND ASCORBIC ACID 1000 ML: KIT at 21:17

## 2022-07-02 NOTE — H&P
[facility] Medicine Services  Clinical Decision Unit (CDU)  History and Physical    Patient Name: Bhargavi Mohr  : 1965  MRN: [id number]  Primary Care Physician: Shelly Bauman, TAMMY  Date of admission: 2022  3:02 PM      Subjective   Subjective     Chief Complaint:  GI bleed    HPI:  Bhargavi Mohr is a 56 y.o. female with PMH of HLD, HTN, hypothyroid, DM, and diverticulosis presents to the ED for bright red bloody bowel movements for past 3 days. She states she has had a total of 5 bright red BM with clots. She complains of abdominal cramps. She doesn't believe she has hemorrhoids. She does not take any blood thinners. Her last colonoscopy was in  that was negative. She denies fever, chest pain, shortness of breath, N/V/D, dysuria. Will admit with hospital medicine for further management.        Review of Systems   Constitutional: Negative.  Negative for activity change and fever.   HENT: Negative.  Negative for congestion and sore throat.    Eyes: Negative.    Respiratory: Negative.  Negative for cough, chest tightness and shortness of breath.    Cardiovascular: Negative.  Negative for chest pain and leg swelling.   Gastrointestinal: Positive for abdominal pain (crampy) and blood in stool (bright red with clots). Negative for nausea and vomiting.   Endocrine: Negative.    Genitourinary: Negative.  Negative for difficulty urinating and dysuria.   Musculoskeletal: Negative.    Skin: Negative.    Neurological: Negative.  Negative for dizziness and light-headedness.   Psychiatric/Behavioral: Negative.  Negative for agitation and confusion.        All other systems reviewed and negative    Personal History     Past Medical History:   Diagnosis Date   • Anxiety    • Diabetes (HCC)     diet and exercise controlled    • Diverticulitis    • History of right salpingo-oophorectomy 2018   • Hypertension    • Melanoma (HCC)    • PONV (postoperative nausea and vomiting)    •  Positive TB test    • Wears glasses              Past Surgical History:   Procedure Laterality Date   • APPENDECTOMY     • CERVICAL CONIZATION     • COLONOSCOPY  2019 DEC    • ENDOSCOPY     • OOPHORECTOMY Right    • SKIN CANCER EXCISION      melanoma   • TONSILLECTOMY AND ADENOIDECTOMY     • TOTAL LAPAROSCOPIC HYSTERECTOMY SALPINGO OOPHORECTOMY Left 8/3/2020    Procedure: TOTAL LAPAROSCOPIC HYSTERECTOMY LEFT SALPINGOOPHORECTOMY WITH DAVINCI ROBOT;  Surgeon: Ne Marmolejo MD;  Location: [facility];  Service: [facility];  Laterality: Left;       Family History:  family history includes Colon cancer in her father; Heart disease in her mother. Otherwise pertinent FHx was reviewed and unremarkable.     Social History:  reports that she has been smoking cigarettes. She has a 20.00 pack-year smoking history. She has never used smokeless tobacco. She reports that she does not drink alcohol and does not use drugs.  Social History     Social History Narrative   • Not on file       Medications:  FLUoxetine, Pediatric Multiple Vitamins, acetaminophen, ibuprofen, levothyroxine, lisinopril, metFORMIN, rizatriptan MLT, rosuvastatin, and valACYclovir    Allergies   Allergen Reactions   • Codeine Dizziness and Delirium     Feels drunk   • Percocet [Oxycodone-Acetaminophen] Itching   • Adhesive Tape Dermatitis       Objective   Objective     Vital Signs:   Temp:  [98 °F (36.7 °C)] 98 °F (36.7 °C)  Heart Rate:  [72-86] 86  Resp:  [16] 16  BP: (134-162)/(74-98) 162/98    Physical Exam  Vitals and nursing note reviewed.   Constitutional:       General: She is not in acute distress.     Appearance: Normal appearance. She is normal weight.   HENT:      Head: Normocephalic and atraumatic.      Nose: Nose normal. No congestion.      Mouth/Throat:      Mouth: Mucous membranes are moist.      Pharynx: Oropharynx is clear.   Eyes:      Extraocular Movements: Extraocular movements intact.      Pupils: Pupils are equal, round, and reactive to  light.   Cardiovascular:      Rate and Rhythm: Normal rate and regular rhythm.      Pulses: Normal pulses.      Heart sounds: Normal heart sounds. No murmur heard.  Pulmonary:      Effort: Pulmonary effort is normal. No respiratory distress.      Breath sounds: Normal breath sounds. No rhonchi.   Abdominal:      General: Abdomen is flat. There is no distension.      Palpations: Abdomen is soft.      Tenderness: There is abdominal tenderness (lower abdomen on palpation). There is no guarding.   Musculoskeletal:         General: No swelling or tenderness. Normal range of motion.      Cervical back: Normal range of motion. No tenderness.   Skin:     General: Skin is warm and dry.      Capillary Refill: Capillary refill takes less than 2 seconds.   Neurological:      General: No focal deficit present.      Mental Status: She is alert and oriented to person, place, and time. Mental status is at baseline.      Motor: No weakness.   Psychiatric:         Mood and Affect: Mood normal.         Behavior: Behavior normal.         Thought Content: Thought content normal.         Judgment: Judgment normal.            Results Reviewed:  LAB RESULTS:      Lab 07/02/22  1459   WBC 8.90   HEMOGLOBIN 9.8*   HEMATOCRIT 31.0*   PLATELETS 251   NEUTROS ABS 5.12   IMMATURE GRANS (ABS) 0.05   LYMPHS ABS 2.87   MONOS ABS 0.55   EOS ABS 0.26   MCV 94.5   LACTATE 1.1         Lab 07/02/22  1459   SODIUM 142   POTASSIUM 4.3   CHLORIDE 106   CO2 27.0   ANION GAP 9.0   BUN 17   CREATININE 0.69   EGFR 102.6   GLUCOSE 93   CALCIUM 9.2         Lab 07/02/22  1459   TOTAL PROTEIN 6.5   ALBUMIN 3.80   GLOBULIN 2.7   ALT (SGPT) 17   AST (SGOT) 17   BILIRUBIN <0.2   ALK PHOS 70                 Lab 07/02/22  1459   ABO TYPING A   RH TYPING Positive   ANTIBODY SCREEN Negative         Brief Urine Lab Results     None        Microbiology Results (last 10 days)     Procedure Component Value - Date/Time    COVID PRE-OP / PRE-PROCEDURE SCREENING ORDER (NO  ISOLATION) - Swab, Nasopharynx [[id number]]  (Normal) Collected: 07/02/22 1813    Lab Status: Final result Specimen: Swab from Nasopharynx Updated: 07/02/22 1841    Narrative:      The following orders were created for panel order COVID PRE-OP / PRE-PROCEDURE SCREENING ORDER (NO ISOLATION) - Swab, Nasopharynx.  Procedure                               Abnormality         Status                     ---------                               -----------         ------                     COVID-19 and FLU A/B PCR...[[id number]]  Normal              Final result                 Please view results for these tests on the individual orders.    COVID-19 and FLU A/B PCR - Swab, Nasopharynx [[id number]]  (Normal) Collected: 07/02/22 1813    Lab Status: Final result Specimen: Swab from Nasopharynx Updated: 07/02/22 1841     COVID19 Not Detected     Influenza A PCR Not Detected     Influenza B PCR Not Detected    Narrative:      Fact sheet for providers: https://www.fda.gov/media/734911/download    Fact sheet for patients: https://www.fda.gov/media/800518/download    Test performed by PCR.        CT Abdomen Pelvis With & Without Contrast    Result Date: 7/2/2022  1.  There is a partially calcified lesion in the left parapelvic renal area which has been noted and may relate to partially calcified cyst or possibly sequela of an aneurysm. 2.  Colonic diverticulosis. 3.  Atherosclerotic changes are noted.  This report was finalized on 7/2/2022 5:08 PM by Vadim Banda MD.        Assessment & Plan   Assessment / Plan     Active Hospital Problems    Diagnosis  POA   • GI bleed [K92.2]  Yes   • HLD (hyperlipidemia) [E78.5]  Unknown   • Hypothyroidism (acquired) [E03.9]  Unknown   • Type 2 diabetes mellitus (HCC) [E11.9]  Yes   • Essential hypertension [I10]  Yes   • Diverticulosis [K57.90]  Yes       Plan:    Bhargavi Mohr is a 56 y.o. female with PMH of HLD, HTN, hypothyroid, DM, and diverticulosis presents to the ED for bright red bloody  bowel movements for past 3 days. Admitted to CDU with hospital medicine for concern of GI bleed and consulted GI for colonoscopy.    GI bleed  Diverticulosis  Anemia  -CT shows colonic diverticulosis  -Hgb 9.8  -Serial H/H Q6  -Last colonoscopy was 2019  -Pending sed rate and CRP  -Pain control  -MOVIPREP ordered in ED  -NPO at midnight  -Would benefit from colonoscopy  -Consult GI     DM 2  -SSI  -Q6 blood glucose checks while NPO    HLD  -Continue home statin    HTN  -Hold home meds in setting of GI bleed    Hypothyroidism  -Continue home synthroid    Mood disorder  -Continue home prozac          CODE STATUS:  FULL  There are no questions and answers to display.           Discharge Blueprint (criteria for discharge):   1. Vital signs stable  2. Cleared by GI for discharge    Signature: Electronically signed by TAMMY Johnson, 07/02/22, 7:35 PM EDT.

## 2022-07-02 NOTE — ED PROVIDER NOTES
EMERGENCY DEPARTMENT ENCOUNTER    Pt Name: Bhargavi Mohr  MRN: [id number]  Pt :   1965  Room Number:    Date of encounter:  2022  PCP: Shelly Bauman, TAMMY  ED Provider: Jordon Gallardo MD    Historian: Patient      HPI:  Chief Complaint: Bloody stool and abdominal discomfort        Context: Bhargavi Mohr is a 56 y.o. female who presents to the ED c/o symptoms which started 2 days ago as a low abdominal crampy sensation followed by a pure blood stool.  The patient's last bowel movement with feces occurred for 5 days ago.  She has not been sitting on the toilet or straining.  She has not felt any hemorrhoids.  She does have a history of diverticulosis but not diverticulitis and she has not suffered previous GI bleeds.  She denies taking blood thinners.  Her gastroenterologist is Dr. Oly Duarte.  Her last colonoscopy was in 2019.  The patient continues to have abdominal cramps and bloody stools.  They seem to resolve yesterday but today she has now had 2 episodes of a significant amount of bright red blood with blood clots rectal bleeding.  She notes moderate to severe pain in her low abdomen when she has a cramping episode.  At this point she has minimal discomfort in the low abdomen.  Past abdominal surgical procedures include appendectomy and hysterectomy.        PAST MEDICAL HISTORY  Past Medical History:   Diagnosis Date   • Anxiety    • Diabetes (HCC)     diet and exercise controlled    • Diverticulitis    • History of right salpingo-oophorectomy 2018   • Hypertension    • Melanoma (HCC)    • PONV (postoperative nausea and vomiting)    • Positive TB test    • Wears glasses          PAST SURGICAL HISTORY  Past Surgical History:   Procedure Laterality Date   • APPENDECTOMY     • CERVICAL CONIZATION     • COLONOSCOPY  2019 DEC    • ENDOSCOPY     • OOPHORECTOMY Right    • SKIN CANCER EXCISION      melanoma   • TONSILLECTOMY AND ADENOIDECTOMY     • TOTAL LAPAROSCOPIC  HYSTERECTOMY SALPINGO OOPHORECTOMY Left 8/3/2020    Procedure: TOTAL LAPAROSCOPIC HYSTERECTOMY LEFT SALPINGOOPHORECTOMY WITH DAVINCI ROBOT;  Surgeon: Ne Marmolejo MD;  Location: [facility];  Service: [facility];  Laterality: Left;         FAMILY HISTORY  Family History   Problem Relation Age of Onset   • Heart disease Mother    • Colon cancer Father    • Breast cancer Neg Hx    • Ovarian cancer Neg Hx          SOCIAL HISTORY  Social History     Socioeconomic History   • Marital status:    • Number of children: 2   Tobacco Use   • Smoking status: Current Some Day Smoker     Packs/day: 1.00     Years: 20.00     Pack years: 20.00     Types: Cigarettes     Last attempt to quit: 7/31/2006     Years since quitting: 15.9   • Smokeless tobacco: Never Used   Substance and Sexual Activity   • Alcohol use: Never   • Drug use: Never   • Sexual activity: Defer         ALLERGIES  Codeine, Percocet [oxycodone-acetaminophen], and Adhesive tape        REVIEW OF SYSTEMS  Review of Systems       All systems reviewed and negative except for those discussed in HPI.       PHYSICAL EXAM    I have reviewed the triage vital signs and nursing notes.    ED Triage Vitals [07/02/22 1417]   Temp Heart Rate Resp BP SpO2   98 °F (36.7 °C) 79 16 143/85 100 %      Temp src Heart Rate Source Patient Position BP Location FiO2 (%)   Oral Monitor Sitting Left arm --       Physical Exam  GENERAL:   Appears pleasant, nontoxic, no acute distress  HENT: Nares patent.  EYES: No scleral icterus.  CV: Regular rhythm, regular rate.  No murmurs gallops rubs  RESPIRATORY: Normal effort.  No audible wheezes, rales or rhonchi.  Clear to auscultation  ABDOMEN: Soft, mildly tender to palpation in the suprapubic region only.  No hepatosplenomegaly or masses.  Bowel sounds within normal limits.  MUSCULOSKELETAL: No deformities.   NEURO: Alert, moves all extremities, follows commands.  SKIN: Warm, dry, no rash visualized.        LAB RESULTS  Recent Results  (from the past 24 hour(s))   Comprehensive Metabolic Panel    Collection Time: 07/02/22  2:59 PM    Specimen: Blood   Result Value Ref Range    Glucose 93 65 - 99 mg/dL    BUN 17 6 - 20 mg/dL    Creatinine 0.69 0.57 - 1.00 mg/dL    Sodium 142 136 - 145 mmol/L    Potassium 4.3 3.5 - 5.2 mmol/L    Chloride 106 98 - 107 mmol/L    CO2 27.0 22.0 - 29.0 mmol/L    Calcium 9.2 8.6 - 10.5 mg/dL    Total Protein 6.5 6.0 - 8.5 g/dL    Albumin 3.80 3.50 - 5.20 g/dL    ALT (SGPT) 17 1 - 33 U/L    AST (SGOT) 17 1 - 32 U/L    Alkaline Phosphatase 70 39 - 117 U/L    Total Bilirubin <0.2 0.0 - 1.2 mg/dL    Globulin 2.7 gm/dL    A/G Ratio 1.4 g/dL    BUN/Creatinine Ratio 24.6 7.0 - 25.0    Anion Gap 9.0 5.0 - 15.0 mmol/L    eGFR 102.6 >60.0 mL/min/1.73   Type & Screen    Collection Time: 07/02/22  2:59 PM    Specimen: Blood   Result Value Ref Range    ABO Type A     RH type Positive     Antibody Screen Negative     T&S Expiration Date 7/5/2022 11:59:59 PM    Green Top (Gel)    Collection Time: 07/02/22  2:59 PM   Result Value Ref Range    Extra Tube Hold for add-ons.    Lavender Top    Collection Time: 07/02/22  2:59 PM   Result Value Ref Range    Extra Tube hold for add-on    Gold Top - SST    Collection Time: 07/02/22  2:59 PM   Result Value Ref Range    Extra Tube Hold for add-ons.    Light Blue Top    Collection Time: 07/02/22  2:59 PM   Result Value Ref Range    Extra Tube Hold for add-ons.    CBC Auto Differential    Collection Time: 07/02/22  2:59 PM    Specimen: Blood   Result Value Ref Range    WBC 8.90 3.40 - 10.80 10*3/mm3    RBC 3.28 (L) 3.77 - 5.28 10*6/mm3    Hemoglobin 9.8 (L) 12.0 - 15.9 g/dL    Hematocrit 31.0 (L) 34.0 - 46.6 %    MCV 94.5 79.0 - 97.0 fL    MCH 29.9 26.6 - 33.0 pg    MCHC 31.6 31.5 - 35.7 g/dL    RDW 13.2 12.3 - 15.4 %    RDW-SD 45.6 37.0 - 54.0 fl    MPV 10.1 6.0 - 12.0 fL    Platelets 251 140 - 450 10*3/mm3    Neutrophil % 57.5 42.7 - 76.0 %    Lymphocyte % 32.2 19.6 - 45.3 %    Monocyte % 6.2 5.0  - 12.0 %    Eosinophil % 2.9 0.3 - 6.2 %    Basophil % 0.6 0.0 - 1.5 %    Immature Grans % 0.6 (H) 0.0 - 0.5 %    Neutrophils, Absolute 5.12 1.70 - 7.00 10*3/mm3    Lymphocytes, Absolute 2.87 0.70 - 3.10 10*3/mm3    Monocytes, Absolute 0.55 0.10 - 0.90 10*3/mm3    Eosinophils, Absolute 0.26 0.00 - 0.40 10*3/mm3    Basophils, Absolute 0.05 0.00 - 0.20 10*3/mm3    Immature Grans, Absolute 0.05 0.00 - 0.05 10*3/mm3    nRBC 0.0 0.0 - 0.2 /100 WBC   Lactic Acid, Plasma    Collection Time: 07/02/22  2:59 PM    Specimen: Blood   Result Value Ref Range    Lactate 1.1 0.5 - 2.0 mmol/L       If labs were ordered, I independently reviewed the results.        RADIOLOGY  CT Abdomen Pelvis With & Without Contrast    Result Date: 7/2/2022  DATE OF EXAM: 7/2/2022 4:37 PM  PROCEDURE: CT ABDOMEN PELVIS W WO CONTRAST-  INDICATIONS: GI BLEED PROTOCOL  COMPARISON: August 20, 2020, September 9, 2020  TECHNIQUE: Routine transaxial slices were obtained through the abdomen without the administration of intravenous contrast. Additional scanning through the abdomen and pelvis followed by the intravenous administration of 100 mL of Isovue 370. Reconstructed coronal and sagittal images were also obtained. Automated exposure control and iterative construction methods were used.   FINDINGS: There is no definite abnormality of the liver, spleen, pancreas or right kidney. As noted on prior CT there is a hyperdense area in the left parapelvic area that may reflect a partially calcified cyst this measures 1.36 x 1.26 cm previously measuring 1.75 x 1.4 cm. There is no obstructive uropathy.  There is colonic diverticulosis without definitive changes of acute diverticulitis. There is no bowel obstruction. The bladder is grossly unremarkable. Patient has had a hysterectomy.  The visualized osseous structures do not appear unusual.      1.  There is a partially calcified lesion in the left parapelvic renal area which has been noted and may relate to  partially calcified cyst or possibly sequela of an aneurysm. 2.  Colonic diverticulosis. 3.  Atherosclerotic changes are noted.  This report was finalized on 7/2/2022 5:08 PM by Vadim Banda MD.        I ordered and reviewed the above noted radiographic studies.        See radiologist's dictation for official interpretation.        PROCEDURES    Procedures    No orders to display       MEDICATIONS GIVEN IN ER    Medications   sodium chloride 0.9 % flush 10 mL (has no administration in time range)   sodium chloride 0.9 % bolus 1,000 mL (1,000 mL Intravenous New Bag 7/2/22 1712)   iopamidol (ISOVUE-370) 76 % injection 100 mL (100 mL Intravenous Given 7/2/22 1656)         PROGRESS, DATA ANALYSIS, CONSULTS, AND MEDICAL DECISION MAKING    All labs have been independently reviewed by me.  All radiology studies have been reviewed by me and the radiologist dictating the report.   EKG's have been independently viewed and interpreted by me.      Differential diagnoses: Consider ischemic colitis, diverticulosis/-itis, etc.      ED Course as of 07/02/22 1801   Sat Jul 02, 2022   1535 Hemoglobin(!): 9.8 [MS]   1535 Hematocrit(!): 31.0 [MS]   1640 Significant drop in hemoglobin and hematocrit as compared to last blood work we have which was from 2020.  Patient has been unable to find record of more recent blood draws thus far.  I have ordered bolus of IV fluids. [MS]   1751 I have paged Dr. Cintron, on-call gastroenterology.  Dr. Duarte is the patient's gastroenterologist but operates out of [facility].  I spoke with Dr. Sun, hospitalist will admit. [MS]   9329 I spoke with Dr. De Leon, gastroenterology on-call.  He will follow this patient on an inpatient basis. [MS]      ED Course User Index  [MS] Jordon Gallardo MD             AS OF 18:01 EDT VITALS:    BP - 143/85  HR - 72  TEMP - 98 °F (36.7 °C) (Oral)  O2 SATS - 98%                  DIAGNOSIS  Final diagnoses:   Rectal bleeding   Anemia, unspecified type          DISPOSITION  Admission           Jordon Gallardo MD  07/03/22 0150     stated

## 2022-07-03 ENCOUNTER — ANESTHESIA (OUTPATIENT)
Dept: GASTROENTEROLOGY | Facility: HOSPITAL | Age: 57
End: 2022-07-03

## 2022-07-03 ENCOUNTER — ANESTHESIA EVENT (OUTPATIENT)
Dept: GASTROENTEROLOGY | Facility: HOSPITAL | Age: 57
End: 2022-07-03

## 2022-07-03 LAB
ANION GAP SERPL CALCULATED.3IONS-SCNC: 9 MMOL/L (ref 5–15)
BASOPHILS # BLD AUTO: 0.02 10*3/MM3 (ref 0–0.2)
BASOPHILS NFR BLD AUTO: 0.3 % (ref 0–1.5)
BUN SERPL-MCNC: 10 MG/DL (ref 6–20)
BUN/CREAT SERPL: 18.5 (ref 7–25)
CALCIUM SPEC-SCNC: 9.1 MG/DL (ref 8.6–10.5)
CHLORIDE SERPL-SCNC: 107 MMOL/L (ref 98–107)
CO2 SERPL-SCNC: 26 MMOL/L (ref 22–29)
CREAT SERPL-MCNC: 0.54 MG/DL (ref 0.57–1)
DEPRECATED RDW RBC AUTO: 42.9 FL (ref 37–54)
EGFRCR SERPLBLD CKD-EPI 2021: 108.9 ML/MIN/1.73
EOSINOPHIL # BLD AUTO: 0.19 10*3/MM3 (ref 0–0.4)
EOSINOPHIL NFR BLD AUTO: 3.2 % (ref 0.3–6.2)
ERYTHROCYTE [DISTWIDTH] IN BLOOD BY AUTOMATED COUNT: 13.2 % (ref 12.3–15.4)
GLUCOSE BLDC GLUCOMTR-MCNC: 108 MG/DL (ref 70–130)
GLUCOSE BLDC GLUCOMTR-MCNC: 121 MG/DL (ref 70–130)
GLUCOSE BLDC GLUCOMTR-MCNC: 130 MG/DL (ref 70–130)
GLUCOSE BLDC GLUCOMTR-MCNC: 133 MG/DL (ref 70–130)
GLUCOSE SERPL-MCNC: 125 MG/DL (ref 65–99)
HBA1C MFR BLD: 5.9 % (ref 4.8–5.6)
HCT VFR BLD AUTO: 26.4 % (ref 34–46.6)
HCT VFR BLD AUTO: 27.7 % (ref 34–46.6)
HCT VFR BLD AUTO: 28.1 % (ref 34–46.6)
HGB BLD-MCNC: 8.8 G/DL (ref 12–15.9)
HGB BLD-MCNC: 9 G/DL (ref 12–15.9)
HGB BLD-MCNC: 9.1 G/DL (ref 12–15.9)
IMM GRANULOCYTES # BLD AUTO: 0.02 10*3/MM3 (ref 0–0.05)
IMM GRANULOCYTES NFR BLD AUTO: 0.3 % (ref 0–0.5)
LYMPHOCYTES # BLD AUTO: 2.33 10*3/MM3 (ref 0.7–3.1)
LYMPHOCYTES NFR BLD AUTO: 38.7 % (ref 19.6–45.3)
MCH RBC QN AUTO: 29.6 PG (ref 26.6–33)
MCHC RBC AUTO-ENTMCNC: 33.3 G/DL (ref 31.5–35.7)
MCV RBC AUTO: 88.9 FL (ref 79–97)
MONOCYTES # BLD AUTO: 0.34 10*3/MM3 (ref 0.1–0.9)
MONOCYTES NFR BLD AUTO: 5.6 % (ref 5–12)
NEUTROPHILS NFR BLD AUTO: 3.12 10*3/MM3 (ref 1.7–7)
NEUTROPHILS NFR BLD AUTO: 51.9 % (ref 42.7–76)
NRBC BLD AUTO-RTO: 0 /100 WBC (ref 0–0.2)
PLATELET # BLD AUTO: 224 10*3/MM3 (ref 140–450)
PMV BLD AUTO: 9.8 FL (ref 6–12)
POTASSIUM SERPL-SCNC: 3.8 MMOL/L (ref 3.5–5.2)
RBC # BLD AUTO: 2.97 10*6/MM3 (ref 3.77–5.28)
SODIUM SERPL-SCNC: 142 MMOL/L (ref 136–145)
WBC NRBC COR # BLD: 6.02 10*3/MM3 (ref 3.4–10.8)

## 2022-07-03 PROCEDURE — 99225 PR SBSQ OBSERVATION CARE/DAY 25 MINUTES: CPT | Performed by: INTERNAL MEDICINE

## 2022-07-03 PROCEDURE — G0378 HOSPITAL OBSERVATION PER HR: HCPCS

## 2022-07-03 PROCEDURE — 85025 COMPLETE CBC W/AUTO DIFF WBC: CPT

## 2022-07-03 PROCEDURE — 83036 HEMOGLOBIN GLYCOSYLATED A1C: CPT | Performed by: INTERNAL MEDICINE

## 2022-07-03 PROCEDURE — 85018 HEMOGLOBIN: CPT | Performed by: INTERNAL MEDICINE

## 2022-07-03 PROCEDURE — 82962 GLUCOSE BLOOD TEST: CPT

## 2022-07-03 PROCEDURE — 45378 DIAGNOSTIC COLONOSCOPY: CPT | Performed by: INTERNAL MEDICINE

## 2022-07-03 PROCEDURE — 80048 BASIC METABOLIC PNL TOTAL CA: CPT

## 2022-07-03 PROCEDURE — 25010000002 ONDANSETRON PER 1 MG: Performed by: ANESTHESIOLOGY

## 2022-07-03 PROCEDURE — 96360 HYDRATION IV INFUSION INIT: CPT

## 2022-07-03 PROCEDURE — 99214 OFFICE O/P EST MOD 30 MIN: CPT | Performed by: NURSE PRACTITIONER

## 2022-07-03 PROCEDURE — 96361 HYDRATE IV INFUSION ADD-ON: CPT

## 2022-07-03 PROCEDURE — 85014 HEMATOCRIT: CPT | Performed by: INTERNAL MEDICINE

## 2022-07-03 PROCEDURE — 25010000002 PROPOFOL 10 MG/ML EMULSION: Performed by: ANESTHESIOLOGY

## 2022-07-03 PROCEDURE — 93005 ELECTROCARDIOGRAM TRACING: CPT | Performed by: ANESTHESIOLOGY

## 2022-07-03 RX ORDER — LIDOCAINE HYDROCHLORIDE 10 MG/ML
0.5 INJECTION, SOLUTION EPIDURAL; INFILTRATION; INTRACAUDAL; PERINEURAL ONCE AS NEEDED
Status: DISCONTINUED | OUTPATIENT
Start: 2022-07-03 | End: 2022-07-03 | Stop reason: HOSPADM

## 2022-07-03 RX ORDER — FENTANYL CITRATE 50 UG/ML
50 INJECTION, SOLUTION INTRAMUSCULAR; INTRAVENOUS
Status: DISCONTINUED | OUTPATIENT
Start: 2022-07-03 | End: 2022-07-03 | Stop reason: HOSPADM

## 2022-07-03 RX ORDER — MAGNESIUM CARB/ALUMINUM HYDROX 105-160MG
296 TABLET,CHEWABLE ORAL ONCE
Status: COMPLETED | OUTPATIENT
Start: 2022-07-03 | End: 2022-07-03

## 2022-07-03 RX ORDER — ONDANSETRON 2 MG/ML
INJECTION INTRAMUSCULAR; INTRAVENOUS AS NEEDED
Status: DISCONTINUED | OUTPATIENT
Start: 2022-07-03 | End: 2022-07-03 | Stop reason: SURG

## 2022-07-03 RX ORDER — FAMOTIDINE 10 MG/ML
20 INJECTION, SOLUTION INTRAVENOUS ONCE
Status: COMPLETED | OUTPATIENT
Start: 2022-07-03 | End: 2022-07-03

## 2022-07-03 RX ORDER — SODIUM CHLORIDE 0.9 % (FLUSH) 0.9 %
10 SYRINGE (ML) INJECTION AS NEEDED
Status: DISCONTINUED | OUTPATIENT
Start: 2022-07-03 | End: 2022-07-03 | Stop reason: HOSPADM

## 2022-07-03 RX ORDER — SODIUM CHLORIDE 0.9 % (FLUSH) 0.9 %
10 SYRINGE (ML) INJECTION EVERY 12 HOURS SCHEDULED
Status: DISCONTINUED | OUTPATIENT
Start: 2022-07-03 | End: 2022-07-03 | Stop reason: HOSPADM

## 2022-07-03 RX ORDER — LIDOCAINE HYDROCHLORIDE 10 MG/ML
INJECTION, SOLUTION EPIDURAL; INFILTRATION; INTRACAUDAL; PERINEURAL AS NEEDED
Status: DISCONTINUED | OUTPATIENT
Start: 2022-07-03 | End: 2022-07-03 | Stop reason: SURG

## 2022-07-03 RX ORDER — SODIUM CHLORIDE, SODIUM LACTATE, POTASSIUM CHLORIDE, CALCIUM CHLORIDE 600; 310; 30; 20 MG/100ML; MG/100ML; MG/100ML; MG/100ML
9 INJECTION, SOLUTION INTRAVENOUS CONTINUOUS
Status: DISCONTINUED | OUTPATIENT
Start: 2022-07-03 | End: 2022-07-04 | Stop reason: HOSPADM

## 2022-07-03 RX ORDER — PEG-3350, SODIUM SULFATE, SODIUM CHLORIDE, POTASSIUM CHLORIDE, SODIUM ASCORBATE AND ASCORBIC ACID 7.5-2.691G
1000 KIT ORAL ONCE
Status: DISCONTINUED | OUTPATIENT
Start: 2022-07-03 | End: 2022-07-04 | Stop reason: HOSPADM

## 2022-07-03 RX ORDER — PROPOFOL 10 MG/ML
VIAL (ML) INTRAVENOUS AS NEEDED
Status: DISCONTINUED | OUTPATIENT
Start: 2022-07-03 | End: 2022-07-03 | Stop reason: SURG

## 2022-07-03 RX ORDER — MIDAZOLAM HYDROCHLORIDE 1 MG/ML
1 INJECTION INTRAMUSCULAR; INTRAVENOUS
Status: DISCONTINUED | OUTPATIENT
Start: 2022-07-03 | End: 2022-07-03 | Stop reason: HOSPADM

## 2022-07-03 RX ORDER — FAMOTIDINE 20 MG/1
20 TABLET, FILM COATED ORAL ONCE
Status: DISCONTINUED | OUTPATIENT
Start: 2022-07-03 | End: 2022-07-03 | Stop reason: HOSPADM

## 2022-07-03 RX ADMIN — ACETAMINOPHEN 650 MG: 325 TABLET ORAL at 16:35

## 2022-07-03 RX ADMIN — LIDOCAINE HYDROCHLORIDE 50 MG: 10 INJECTION, SOLUTION EPIDURAL; INFILTRATION; INTRACAUDAL; PERINEURAL at 13:36

## 2022-07-03 RX ADMIN — LEVOTHYROXINE SODIUM 25 MCG: 25 TABLET ORAL at 05:42

## 2022-07-03 RX ADMIN — SODIUM CHLORIDE, POTASSIUM CHLORIDE, SODIUM LACTATE AND CALCIUM CHLORIDE 9 ML/HR: 600; 310; 30; 20 INJECTION, SOLUTION INTRAVENOUS at 13:21

## 2022-07-03 RX ADMIN — LIDOCAINE HYDROCHLORIDE 40 MG: 10 INJECTION, SOLUTION EPIDURAL; INFILTRATION; INTRACAUDAL; PERINEURAL at 13:43

## 2022-07-03 RX ADMIN — Medication 296 ML: at 09:43

## 2022-07-03 RX ADMIN — ONDANSETRON 4 MG: 2 INJECTION INTRAMUSCULAR; INTRAVENOUS at 13:40

## 2022-07-03 RX ADMIN — FLUOXETINE 20 MG: 20 CAPSULE ORAL at 08:34

## 2022-07-03 RX ADMIN — FAMOTIDINE 20 MG: 10 INJECTION INTRAVENOUS at 13:24

## 2022-07-03 RX ADMIN — Medication 10 ML: at 20:47

## 2022-07-03 RX ADMIN — POLYETHYLENE GLYCOL 3350, SODIUM SULFATE, SODIUM CHLORIDE, POTASSIUM CHLORIDE, SODIUM ASCORBATE, AND ASCORBIC ACID 1000 ML: KIT at 05:50

## 2022-07-03 RX ADMIN — ROSUVASTATIN 40 MG: 20 TABLET, FILM COATED ORAL at 20:47

## 2022-07-03 RX ADMIN — SODIUM CHLORIDE, POTASSIUM CHLORIDE, SODIUM LACTATE AND CALCIUM CHLORIDE 100 ML/HR: 600; 310; 30; 20 INJECTION, SOLUTION INTRAVENOUS at 09:43

## 2022-07-03 RX ADMIN — PROPOFOL 75 MCG/KG/MIN: 10 INJECTION, EMULSION INTRAVENOUS at 13:39

## 2022-07-03 NOTE — POST-PROCEDURE NOTE
Colonoscopy-  Pan diverticula noted starting at 45 cm.  Normal mucosa. No blood or bleeding site seen.      REC Observe

## 2022-07-03 NOTE — ANESTHESIA PREPROCEDURE EVALUATION
Anesthesia Evaluation     Patient summary reviewed and Nursing notes reviewed   history of anesthetic complications: PONV  NPO Solid Status: > 8 hours  NPO Liquid Status: > 8 hours           Airway   Mallampati: I  TM distance: >3 FB  Neck ROM: full  No difficulty expected  Comment: Previous Grade 1 view with Mac 3 Blade  Dental      Pulmonary     breath sounds clear to auscultation  (+) a smoker Current,   Cardiovascular     ECG reviewed  Rhythm: regular  Rate: normal    (+) hypertension, hyperlipidemia,       Neuro/Psych  (+) psychiatric history Anxiety,    GI/Hepatic/Renal/Endo    (+)  GI bleeding , diabetes mellitus, thyroid problem hypothyroidism    Musculoskeletal     Abdominal    Substance History      OB/GYN          Other      history of cancer (cervical dysplasia and h/o melanoma)                      Anesthesia Plan    ASA 2     general     intravenous induction     Anesthetic plan, risks, benefits, and alternatives have been provided, discussed and informed consent has been obtained with: patient.    Plan discussed with CRNA.

## 2022-07-03 NOTE — CONSULTS
[facility] [place] [facility]enterology Consult    Referring Provider: No ref. provider found    PCP: Shelly Bauman, TAMMY    Reason for Consultation: Gastrointestinal bleeding    Chief complaint: Abdominal pain and bleeding    History of present illness:    Bhargavi Mohr is a 56 y.o. female who is admitted with a weeklong onset of worsening right lower quadrant abdominal pain and associated bloody bowel movements.  Patient notes her symptoms began earlier this week prompting her to call her primary GI provider who stated that should symptoms persist she should seek emergency medical attention.  Patient notes that she went a few days without any further bleeding and thought it was incidental before her symptoms resumed 3 days ago.  Patient notes that she has been having right lower quadrant abdominal pain that feels as though she is being stabbed with a hot poker and radiates up throughout her abdomen into her chest.  Since onset, has had fresh bloody hematochezia stools clot and sediment.  No N/V/D, SOB, CP, or fever/chills.  Denies use of NSAIDs and is not anticoagulated.  No prior history of gastrointestinal bleeding; last colonoscopy showed diverticulosis but no acute anomaly. Past medical, surgical, social, and family histories are reviewed for accuracy.  No documented alleviating or exacerbating factors.  Does not endorse pain at time of exam.    Allergies:  Codeine, Percocet [oxycodone-acetaminophen], and Adhesive tape    Scheduled Meds:  FLUoxetine, 20 mg, Oral, Daily  insulin lispro, 0-7 Units, Subcutaneous, Q6H  levothyroxine, 25 mcg, Oral, Q AM  rosuvastatin, 40 mg, Oral, Daily  sodium chloride, 10 mL, Intravenous, Q12H         Infusions:  lactated ringers, 100 mL/hr, Last Rate: 100 mL/hr (07/03/22 0943)        PRN Meds:  •  acetaminophen **OR** acetaminophen **OR** acetaminophen  •  dextrose  •  dextrose  •  glucagon (human recombinant)  •  sodium chloride  •  sodium chloride    Home Meds:  Medications Prior to  Admission   Medication Sig Dispense Refill Last Dose   • FLUoxetine (PROzac) 20 MG capsule Take 20 mg by mouth Daily.   6/30/2022   • ibuprofen (ADVIL,MOTRIN) 200 MG tablet Take 200 mg by mouth Every 6 (Six) Hours As Needed for Mild Pain .   7/1/2022 at Unknown time   • levothyroxine (SYNTHROID, LEVOTHROID) 25 MCG tablet Take 25 mcg by mouth Daily.   7/2/2022 at Unknown time   • lisinopril (PRINIVIL,ZESTRIL) 40 MG tablet Take 40 mg by mouth Daily.   6/30/2022   • metFORMIN (GLUCOPHAGE) 500 MG tablet Take 2,000 mg by mouth Daily With Breakfast.   6/30/2022   • rizatriptan MLT (MAXALT-MLT) 10 MG disintegrating tablet Place 10 mg on the tongue As Needed.   Past Month at Unknown time   • rosuvastatin (CRESTOR) 40 MG tablet Take 40 mg by mouth Daily.   6/30/2022   • acetaminophen (TYLENOL) 325 MG tablet Take 2 tablets by mouth Every 6 (Six) Hours As Needed for Mild Pain . 60 tablet 2 More than a month at Unknown time   • Pediatric Multiple Vitamins (FLINTSTONES MULTIVITAMIN PO) Take 1 tablet by mouth Daily.   6/30/2022   • valACYclovir (VALTREX) 1000 MG tablet Take 2,000 mg by mouth 2 (Two) Times a Day As Needed (as needed for fever blisters).   More than a month at Unknown time       ROS: Review of Systems   Constitutional: Positive for activity change. Negative for appetite change, chills, diaphoresis, fatigue, fever and unexpected weight change.   HENT: Negative for sore throat, trouble swallowing and voice change.    Eyes: Negative.    Respiratory: Negative for apnea, cough, choking, chest tightness, shortness of breath, wheezing and stridor.    Cardiovascular: Negative for chest pain, palpitations and leg swelling.   Gastrointestinal: Positive for abdominal pain, anal bleeding, blood in stool and nausea. Negative for abdominal distention, constipation, diarrhea, rectal pain and vomiting.   Endocrine: Negative.    Genitourinary: Negative.    Musculoskeletal: Negative.    Skin: Negative.    Allergic/Immunologic:  "Negative.    Neurological: Negative.    Hematological: Negative.    Psychiatric/Behavioral: Negative.    All other systems reviewed and are negative.      PAST MED HX:  Past Medical History:   Diagnosis Date   • Anxiety    • Diabetes (HCC)     diet and exercise controlled    • Diverticulitis    • History of right salpingo-oophorectomy 12/2018   • Hypertension    • Melanoma (HCC)    • PONV (postoperative nausea and vomiting)    • Positive TB test    • Wears glasses        PAST SURG HX:  Past Surgical History:   Procedure Laterality Date   • APPENDECTOMY     • CERVICAL CONIZATION     • COLONOSCOPY  2019 DEC    • ENDOSCOPY     • OOPHORECTOMY Right    • SKIN CANCER EXCISION      melanoma   • TONSILLECTOMY AND ADENOIDECTOMY     • TOTAL LAPAROSCOPIC HYSTERECTOMY SALPINGO OOPHORECTOMY Left 8/3/2020    Procedure: TOTAL LAPAROSCOPIC HYSTERECTOMY LEFT SALPINGOOPHORECTOMY WITH JumptapINCI ROBOT;  Surgeon: Ne Marmolejo MD;  Location: [facility];  Service: [facility];  Laterality: Left;       FAM HX:  Family History   Problem Relation Age of Onset   • Heart disease Mother    • Colon cancer Father    • Breast cancer Neg Hx    • Ovarian cancer Neg Hx        SOC HX:  Social History     Socioeconomic History   • Marital status:    • Number of children: 2   Tobacco Use   • Smoking status: Current Some Day Smoker     Packs/day: 1.00     Years: 20.00     Pack years: 20.00     Types: Cigarettes     Last attempt to quit: 7/31/2006     Years since quitting: 15.9   • Smokeless tobacco: Never Used   Substance and Sexual Activity   • Alcohol use: Never   • Drug use: Never   • Sexual activity: Defer       PHYSICAL EXAM  /99 (BP Location: Right arm, Patient Position: Sitting)   Pulse 77   Temp 97.7 °F (36.5 °C) (Oral)   Resp 16   Ht 165.1 cm (65\")   Wt 71.7 kg (158 lb)   SpO2 99%   BMI 26.29 kg/m²   Wt Readings from Last 3 Encounters:   07/02/22 71.7 kg (158 lb)   04/21/21 72.6 kg (160 lb)   10/09/20 70.3 kg (155 lb)   ,body " mass index is 26.29 kg/m².  Physical Exam  Vitals and nursing note reviewed.   Constitutional:       General: She is not in acute distress.     Appearance: Normal appearance. She is normal weight. She is not ill-appearing or toxic-appearing.   HENT:      Head: Normocephalic and atraumatic.      Mouth/Throat:      Mouth: Mucous membranes are moist.      Pharynx: Oropharynx is clear. No oropharyngeal exudate.   Eyes:      General: No scleral icterus.     Extraocular Movements: Extraocular movements intact.      Conjunctiva/sclera: Conjunctivae normal.      Pupils: Pupils are equal, round, and reactive to light.   Cardiovascular:      Rate and Rhythm: Normal rate and regular rhythm.      Pulses: Normal pulses.      Heart sounds: Normal heart sounds.   Pulmonary:      Effort: Pulmonary effort is normal. No respiratory distress.      Breath sounds: Normal breath sounds.   Abdominal:      General: Abdomen is flat. Bowel sounds are normal. There is no distension.      Palpations: Abdomen is soft. There is no mass.      Tenderness: There is abdominal tenderness. There is no guarding or rebound.      Hernia: No hernia is present.   Genitourinary:     Rectum: Guaiac result positive.   Musculoskeletal:         General: Normal range of motion.      Cervical back: Normal range of motion and neck supple. No rigidity or tenderness.      Right lower leg: No edema.      Left lower leg: No edema.   Lymphadenopathy:      Cervical: No cervical adenopathy.   Skin:     General: Skin is warm and dry.      Capillary Refill: Capillary refill takes less than 2 seconds.      Coloration: Skin is not jaundiced or pale.   Neurological:      General: No focal deficit present.      Mental Status: She is alert and oriented to person, place, and time.   Psychiatric:         Mood and Affect: Mood normal.         Behavior: Behavior normal.         Thought Content: Thought content normal.         Judgment: Judgment normal.         Results Review:   I  reviewed the patient's new clinical results.  I reviewed the patient's new imaging results and agree with the interpretation.  I personally viewed and interpreted the patient's EKG/Telemetry data    Lab Results   Component Value Date    WBC 6.02 07/03/2022    HGB 8.8 (L) 07/03/2022    HGB 9.1 (L) 07/02/2022    HGB 9.8 (L) 07/02/2022    HCT 26.4 (L) 07/03/2022    MCV 88.9 07/03/2022     07/03/2022       No results found for: INR    Lab Results   Component Value Date    GLUCOSE 125 (H) 07/03/2022    BUN 10 07/03/2022    CREATININE 0.54 (L) 07/03/2022    EGFRIFNONA 71 08/25/2020    BCR 18.5 07/03/2022     07/03/2022    K 3.8 07/03/2022    CO2 26.0 07/03/2022    CALCIUM 9.1 07/03/2022    ALBUMIN 3.80 07/02/2022    ALKPHOS 70 07/02/2022    BILITOT <0.2 07/02/2022    ALT 17 07/02/2022    AST 17 07/02/2022       CT Abdomen Pelvis With & Without Contrast    Result Date: 7/2/2022  DATE OF EXAM: 7/2/2022 4:37 PM  PROCEDURE: CT ABDOMEN PELVIS W WO CONTRAST-  INDICATIONS: GI BLEED PROTOCOL  COMPARISON: August 20, 2020, September 9, 2020  TECHNIQUE: Routine transaxial slices were obtained through the abdomen without the administration of intravenous contrast. Additional scanning through the abdomen and pelvis followed by the intravenous administration of 100 mL of Isovue 370. Reconstructed coronal and sagittal images were also obtained. Automated exposure control and iterative construction methods were used.   FINDINGS: There is no definite abnormality of the liver, spleen, pancreas or right kidney. As noted on prior CT there is a hyperdense area in the left parapelvic area that may reflect a partially calcified cyst this measures 1.36 x 1.26 cm previously measuring 1.75 x 1.4 cm. There is no obstructive uropathy.  There is colonic diverticulosis without definitive changes of acute diverticulitis. There is no bowel obstruction. The bladder is grossly unremarkable. Patient has had a hysterectomy.  The visualized  osseous structures do not appear unusual.      1.  There is a partially calcified lesion in the left parapelvic renal area which has been noted and may relate to partially calcified cyst or possibly sequela of an aneurysm. 2.  Colonic diverticulosis. 3.  Atherosclerotic changes are noted.  This report was finalized on 7/2/2022 5:08 PM by aVdim Banda MD.        COVID19   Date Value Ref Range Status   07/02/2022 Not Detected Not Detected - Ref. Range Final     ASSESSMENTS/PLANS  1.  Acute right lower quadrant abdominal pain  2.  Acute gastrointestinal bleeding, lower source, hematochezia, suspected diverticular  3.  Type 2 diabetes mellitus, well controlled  4.  Essential hypertension  5.  History of diverticulosis.    Bhargavi Mohr is a 56 y.o. female presenting to hospital with acute onset of lower gastrointestinal bleeding with significant right lower quadrant pain.  Has history of diverticulosis and symptoms concerning for diverticulitis.  Recommend colonoscopy today for definitive evaluation given severity of symptoms.    >>> Continue NPO.  >>> Obtain consent for colonoscopy  >>> Has completed MoviPrep bowel prep; still not clear.  Will order 1 additional bottle of magnesium citrate and evaluate response.  >>> Continue antiemetics and pain control measures.  >>> Trend hemoglobin; transfuse for hemoglobins less than 7 or symptomatic anemia.    I discussed the patient's findings and my recommendations with patient, family and nursing staff    TAMMY Lizama  07/03/22  10:07 EDT

## 2022-07-03 NOTE — ANESTHESIA POSTPROCEDURE EVALUATION
Patient: Bhargavi Mohr    Procedure Summary     Date: 07/03/22 Room / Location:  [facility]    Anesthesia Start: 1328 Anesthesia Stop: 1405    Procedure: COLONOSCOPY (N/A ) Diagnosis:     Surgeons: Jordon Cintron MD Provider: Tuyet Sol MD    Anesthesia Type: general ASA Status: 2          Anesthesia Type: general    Vitals  Vitals Value Taken Time   /68 07/03/22 1420   Temp 97.7 °F (36.5 °C) 07/03/22 1420   Pulse 85 07/03/22 1420   Resp 18 07/03/22 1420   SpO2 100 % 07/03/22 1420           Post Anesthesia Care and Evaluation    Patient location during evaluation: PACU  Patient participation: complete - patient participated  Level of consciousness: awake and alert  Pain management: adequate    Airway patency: patent  Anesthetic complications: No anesthetic complications  PONV Status: none  Cardiovascular status: hemodynamically stable and acceptable  Respiratory status: nonlabored ventilation, acceptable and nasal cannula  Hydration status: acceptable

## 2022-07-03 NOTE — PLAN OF CARE
Goal Outcome Evaluation:  Plan of Care Reviewed With: patient        Progress: no change       VSS, NSR per monitor, O2 on RA. No c/o pain overnight. Bowel prep completed overnight. Pt states large amounts of bright red blood in stools. IV fluids infusing per order.       Problem: Adult Inpatient Plan of Care  Goal: Absence of Hospital-Acquired Illness or Injury  Intervention: Prevent Infection  Recent Flowsheet Documentation  Taken 7/3/2022 0600 by Daphne Wilson RN  Infection Prevention:   personal protective equipment utilized   rest/sleep promoted   single patient room provided   visitors restricted/screened  Taken 7/3/2022 0400 by Daphne Wilson RN  Infection Prevention:   personal protective equipment utilized   rest/sleep promoted   single patient room provided   visitors restricted/screened  Taken 7/3/2022 0200 by Daphne Wilson RN  Infection Prevention:   personal protective equipment utilized   rest/sleep promoted   single patient room provided   visitors restricted/screened  Taken 7/3/2022 0000 by Daphne Wilson RN  Infection Prevention:   personal protective equipment utilized   rest/sleep promoted   single patient room provided   visitors restricted/screened  Taken 7/2/2022 2200 by Daphne Wilson RN  Infection Prevention:   personal protective equipment utilized   rest/sleep promoted   single patient room provided   visitors restricted/screened  Taken 7/2/2022 2000 by Daphne Wilson RN  Infection Prevention:   personal protective equipment utilized   rest/sleep promoted   single patient room provided   visitors restricted/screened

## 2022-07-03 NOTE — PROGRESS NOTES
[facility] Medicine Services  PROGRESS NOTE    Patient Name: Bhargavi Mohr  : 1965  MRN: [id number]    Date of Admission: 2022  Primary Care Physician: Shelly Bauman, TAMMY    Subjective   Subjective     CC: Follow-up GI bleed    HPI: No acute events overnight, patient she rested well, does endorse some abdominal cramping    ROS:  Gen- No fevers, chills  CV- No chest pain, palpitations  Resp- No cough, dyspnea  GI- No N/V/D, +abd pain       Objective   Objective     Vital Signs:   Temp:  [98 °F (36.7 °C)-98.6 °F (37 °C)] 98 °F (36.7 °C)  Heart Rate:  [72-88] 77  Resp:  [16-18] 18  BP: (132-162)/(73-98) 132/73     Physical Exam:  Constitutional: No acute distress, awake, alert  HENT: NCAT, mucous membranes moist  Respiratory: Clear to auscultation bilaterally, respiratory effort normal   Cardiovascular: RRR, no murmurs, rubs, or gallops  Gastrointestinal: Positive bowel sounds, soft, nontender, nondistended  Musculoskeletal: No bilateral ankle edema  Psychiatric: Appropriate affect, cooperative  Neurologic: Oriented x 3, nonfocal  Skin: No rashes      Results Reviewed:  LAB RESULTS:      Lab 22  0551 22  23522  1459   WBC 6.02  --   --  8.90   HEMOGLOBIN 8.8* 9.1*  --  9.8*   HEMATOCRIT 26.4* 27.7*  --  31.0*   PLATELETS 224  --   --  251   NEUTROS ABS 3.12  --   --  5.12   IMMATURE GRANS (ABS) 0.02  --   --  0.05   LYMPHS ABS 2.33  --   --  2.87   MONOS ABS 0.34  --   --  0.55   EOS ABS 0.19  --   --  0.26   MCV 88.9  --   --  94.5   SED RATE  --   --  5  --    PROCALCITONIN  --   --   --  0.03   LACTATE  --   --   --  1.1         Lab 22  0551 22  1459   SODIUM 142 142   POTASSIUM 3.8 4.3   CHLORIDE 107 106   CO2 26.0 27.0   ANION GAP 9.0 9.0   BUN 10 17   CREATININE 0.54* 0.69   EGFR 108.9 102.6   GLUCOSE 125* 93   CALCIUM 9.1 9.2         Lab 22  1459   TOTAL PROTEIN 6.5   ALBUMIN 3.80   GLOBULIN 2.7   ALT (SGPT) 17   AST  (SGOT) 17   BILIRUBIN <0.2   ALK PHOS 70                 Lab 07/02/22  1459   ABO TYPING A   RH TYPING Positive   ANTIBODY SCREEN Negative         Brief Urine Lab Results     None          Microbiology Results Abnormal     Procedure Component Value - Date/Time    COVID PRE-OP / PRE-PROCEDURE SCREENING ORDER (NO ISOLATION) - Swab, Nasopharynx [[id number]]  (Normal) Collected: 07/02/22 1813    Lab Status: Final result Specimen: Swab from Nasopharynx Updated: 07/02/22 1841    Narrative:      The following orders were created for panel order COVID PRE-OP / PRE-PROCEDURE SCREENING ORDER (NO ISOLATION) - Swab, Nasopharynx.  Procedure                               Abnormality         Status                     ---------                               -----------         ------                     COVID-19 and FLU A/B PCR...[[id number]]  Normal              Final result                 Please view results for these tests on the individual orders.    COVID-19 and FLU A/B PCR - Swab, Nasopharynx [[id number]]  (Normal) Collected: 07/02/22 1813    Lab Status: Final result Specimen: Swab from Nasopharynx Updated: 07/02/22 1841     COVID19 Not Detected     Influenza A PCR Not Detected     Influenza B PCR Not Detected    Narrative:      Fact sheet for providers: https://www.fda.gov/media/517968/download    Fact sheet for patients: https://www.fda.gov/media/762414/download    Test performed by PCR.          CT Abdomen Pelvis With & Without Contrast    Result Date: 7/2/2022  DATE OF EXAM: 7/2/2022 4:37 PM  PROCEDURE: CT ABDOMEN PELVIS W WO CONTRAST-  INDICATIONS: GI BLEED PROTOCOL  COMPARISON: August 20, 2020, September 9, 2020  TECHNIQUE: Routine transaxial slices were obtained through the abdomen without the administration of intravenous contrast. Additional scanning through the abdomen and pelvis followed by the intravenous administration of 100 mL of Isovue 370. Reconstructed coronal and sagittal images were also obtained. Automated  exposure control and iterative construction methods were used.   FINDINGS: There is no definite abnormality of the liver, spleen, pancreas or right kidney. As noted on prior CT there is a hyperdense area in the left parapelvic area that may reflect a partially calcified cyst this measures 1.36 x 1.26 cm previously measuring 1.75 x 1.4 cm. There is no obstructive uropathy.  There is colonic diverticulosis without definitive changes of acute diverticulitis. There is no bowel obstruction. The bladder is grossly unremarkable. Patient has had a hysterectomy.  The visualized osseous structures do not appear unusual.      Impression: 1.  There is a partially calcified lesion in the left parapelvic renal area which has been noted and may relate to partially calcified cyst or possibly sequela of an aneurysm. 2.  Colonic diverticulosis. 3.  Atherosclerotic changes are noted.  This report was finalized on 7/2/2022 5:08 PM by Vadim Banda MD.            I have reviewed the medications:  Scheduled Meds:FLUoxetine, 20 mg, Oral, Daily  insulin lispro, 0-7 Units, Subcutaneous, Q6H  levothyroxine, 25 mcg, Oral, Q AM  rosuvastatin, 40 mg, Oral, Daily  sodium chloride, 10 mL, Intravenous, Q12H      Continuous Infusions:lactated ringers, 100 mL/hr, Last Rate: 100 mL/hr (07/03/22 0550)      PRN Meds:.•  acetaminophen **OR** acetaminophen **OR** acetaminophen  •  dextrose  •  dextrose  •  glucagon (human recombinant)  •  sodium chloride  •  sodium chloride    Assessment & Plan   Assessment & Plan     Active Hospital Problems    Diagnosis  POA   • GI bleed [K92.2]  Yes   • HLD (hyperlipidemia) [E78.5]  Unknown   • Hypothyroidism (acquired) [E03.9]  Unknown   • Type 2 diabetes mellitus (HCC) [E11.9]  Yes   • Essential hypertension [I10]  Yes   • Diverticulosis [K57.90]  Yes      Resolved Hospital Problems   No resolved problems to display.        Brief Hospital Course to date:  Bhargavi Mohr is a 56 y.o. female with history of type 2  diabetes, hypertension, hyperlipidemia, hypothyroidism who presented to the ED with abdominal cramping and BRBPR    GI bleed  -Etiology unknown, suspect diverticular, CT abdomen shows colonic diverticulosis  -H&H remains low but stable, continue to trend  -GI consulted, plan for possible C-scope today    Previously well-controlled type 2 diabetes A1c 6.5% (8/2020)  -Follow-up repeat A1c, continue SSI for now    Hypertension  -BP currently stable, holding home meds secondary to above    Hypothyroidism  -Baseline TSH is appropriate, continue Synthroid    Hyperlipidemia  -Continue statin    Mood disorder-continue Prozac    Expected Discharge Location and Transportation: Home  Expected Discharge Date: 7/4/2022    DVT prophylaxis:  Mechanical DVT prophylaxis orders are present.     AM-PAC 6 Clicks Score (PT): 24 (07/02/22 2000)    CODE STATUS:   Code Status and Medical Interventions:   Ordered at: 07/02/22 1943     Level Of Support Discussed With:    Patient     Code Status (Patient has no pulse and is not breathing):    CPR (Attempt to Resuscitate)     Medical Interventions (Patient has pulse or is breathing):    Full Support       Maya Lazcano MD  07/03/22

## 2022-07-04 VITALS
WEIGHT: 158 LBS | HEART RATE: 80 BPM | TEMPERATURE: 98.6 F | HEIGHT: 65 IN | RESPIRATION RATE: 18 BRPM | DIASTOLIC BLOOD PRESSURE: 88 MMHG | OXYGEN SATURATION: 97 % | BODY MASS INDEX: 26.33 KG/M2 | SYSTOLIC BLOOD PRESSURE: 157 MMHG

## 2022-07-04 LAB
GLUCOSE BLDC GLUCOMTR-MCNC: 101 MG/DL (ref 70–130)
GLUCOSE BLDC GLUCOMTR-MCNC: 119 MG/DL (ref 70–130)
HCT VFR BLD AUTO: 28.4 % (ref 34–46.6)
HCT VFR BLD AUTO: 30.1 % (ref 34–46.6)
HGB BLD-MCNC: 10.1 G/DL (ref 12–15.9)
HGB BLD-MCNC: 9.3 G/DL (ref 12–15.9)

## 2022-07-04 PROCEDURE — 85014 HEMATOCRIT: CPT | Performed by: INTERNAL MEDICINE

## 2022-07-04 PROCEDURE — 82962 GLUCOSE BLOOD TEST: CPT

## 2022-07-04 PROCEDURE — G0378 HOSPITAL OBSERVATION PER HR: HCPCS

## 2022-07-04 PROCEDURE — 93010 ELECTROCARDIOGRAM REPORT: CPT | Performed by: INTERNAL MEDICINE

## 2022-07-04 PROCEDURE — 85018 HEMOGLOBIN: CPT | Performed by: INTERNAL MEDICINE

## 2022-07-04 PROCEDURE — 99217 PR OBSERVATION CARE DISCHARGE MANAGEMENT: CPT | Performed by: INTERNAL MEDICINE

## 2022-07-04 RX ADMIN — LEVOTHYROXINE SODIUM 25 MCG: 25 TABLET ORAL at 06:15

## 2022-07-04 RX ADMIN — FLUOXETINE 20 MG: 20 CAPSULE ORAL at 07:49

## 2022-07-04 RX ADMIN — Medication 10 ML: at 08:01

## 2022-07-04 RX ADMIN — ACETAMINOPHEN 650 MG: 325 TABLET ORAL at 07:59

## 2022-07-04 RX ADMIN — ACETAMINOPHEN 650 MG: 325 TABLET ORAL at 00:44

## 2022-07-04 NOTE — DISCHARGE SUMMARY
[facility] Medicine Services  DISCHARGE SUMMARY    Patient Name: Bhargavi Mohr  : 1965  MRN: [id number]    Date of Admission: 2022  3:02 PM  Date of Discharge: 2022  Primary Care Physician: Shelly Bauman APRN    Consults     Date and Time Order Name Status Description    7/3/2022 12:34 AM Inpatient Gastroenterology Consult Completed           Hospital Course     Presenting Problem:   GI bleed [K92.2]    Active Hospital Problems    Diagnosis  POA   • GI bleed [K92.2]  Yes   • HLD (hyperlipidemia) [E78.5]  Unknown   • Hypothyroidism (acquired) [E03.9]  Unknown   • Type 2 diabetes mellitus (HCC) [E11.9]  Yes   • Essential hypertension [I10]  Yes   • Diverticulosis [K57.90]  Yes      Resolved Hospital Problems   No resolved problems to display.          Hospital Course:  Bhargavi Mohr is a 56 y.o. female with history of type 2 diabetes, hypertension, hyperlipidemia, hypothyroidism who presented to the ED with abdominal cramping and BRBPR     GI bleed  -Etiology unknown, suspect diverticular, CT abdomen shows colonic diverticulosis  -GI was consulted she s/p colonoscopy with finding of pan diverticula, normal mucosa, no blood or bleeding site seen.  Recommended to observe, she will follow-up with her primary GI at discharge  -H&H has remained stable, if not improved      well-controlled type 2 diabetes A1c 5.9%  -Continue home regimen     Hypertension  -BP currently stable, okay to resume home meds     Hypothyroidism  -Baseline TSH is appropriate, continue Synthroid     Hyperlipidemia  -Continue statin     Mood disorder-continue Prozac    Discharge Follow Up Recommendations for outpatient labs/diagnostics:  Follow-up with PCP in 1 week  Follow-up with primary GI, patient to call for appointment    Day of Discharge     HPI: No acute events overnight, patient stable, denies any more bloody bowel movement    Review of Systems  Gen- No fevers, chills  CV- No chest pain,  palpitations  Resp- No cough, dyspnea  GI- No N/V/D, abd pain    Vital Signs:   Temp:  [97.7 °F (36.5 °C)-98.4 °F (36.9 °C)] 98 °F (36.7 °C)  Heart Rate:  [69-85] 80  Resp:  [16-18] 18  BP: (102-154)/(53-91) 154/91      Physical Exam:  Constitutional: No acute distress, awake, alert  HENT: NCAT, mucous membranes moist  Respiratory: Clear to auscultation bilaterally, respiratory effort normal   Cardiovascular: RRR, no murmurs, rubs, or gallops  Gastrointestinal: Positive bowel sounds, soft, nontender, nondistended  Musculoskeletal: No bilateral ankle edema  Psychiatric: Appropriate affect, cooperative  Neurologic: Oriented x 3, nonfocal  Skin: No rashes      Pertinent  and/or Most Recent Results     LAB RESULTS:      Lab 07/04/22  0756 07/04/22  0122 07/03/22  1823 07/03/22  0551 07/02/22  2351 07/02/22  2015 07/02/22  1459   WBC  --   --   --  6.02  --   --  8.90   HEMOGLOBIN 10.1* 9.3* 9.0* 8.8* 9.1*  --  9.8*   HEMATOCRIT 30.1* 28.4* 28.1* 26.4* 27.7*  --  31.0*   PLATELETS  --   --   --  224  --   --  251   NEUTROS ABS  --   --   --  3.12  --   --  5.12   IMMATURE GRANS (ABS)  --   --   --  0.02  --   --  0.05   LYMPHS ABS  --   --   --  2.33  --   --  2.87   MONOS ABS  --   --   --  0.34  --   --  0.55   EOS ABS  --   --   --  0.19  --   --  0.26   MCV  --   --   --  88.9  --   --  94.5   SED RATE  --   --   --   --   --  5  --    PROCALCITONIN  --   --   --   --   --   --  0.03   LACTATE  --   --   --   --   --   --  1.1         Lab 07/03/22  0551 07/02/22  1459   SODIUM 142 142   POTASSIUM 3.8 4.3   CHLORIDE 107 106   CO2 26.0 27.0   ANION GAP 9.0 9.0   BUN 10 17   CREATININE 0.54* 0.69   EGFR 108.9 102.6   GLUCOSE 125* 93   CALCIUM 9.1 9.2   HEMOGLOBIN A1C 5.90*  --          Lab 07/02/22  1459   TOTAL PROTEIN 6.5   ALBUMIN 3.80   GLOBULIN 2.7   ALT (SGPT) 17   AST (SGOT) 17   BILIRUBIN <0.2   ALK PHOS 70                 Lab 07/02/22  1459   ABO TYPING A   RH TYPING Positive   ANTIBODY SCREEN Negative          Brief Urine Lab Results     None        Microbiology Results (last 10 days)     Procedure Component Value - Date/Time    COVID PRE-OP / PRE-PROCEDURE SCREENING ORDER (NO ISOLATION) - Swab, Nasopharynx [[id number]]  (Normal) Collected: 07/02/22 1813    Lab Status: Final result Specimen: Swab from Nasopharynx Updated: 07/02/22 1841    Narrative:      The following orders were created for panel order COVID PRE-OP / PRE-PROCEDURE SCREENING ORDER (NO ISOLATION) - Swab, Nasopharynx.  Procedure                               Abnormality         Status                     ---------                               -----------         ------                     COVID-19 and FLU A/B PCR...[[id number]]  Normal              Final result                 Please view results for these tests on the individual orders.    COVID-19 and FLU A/B PCR - Swab, Nasopharynx [[id number]]  (Normal) Collected: 07/02/22 1813    Lab Status: Final result Specimen: Swab from Nasopharynx Updated: 07/02/22 1841     COVID19 Not Detected     Influenza A PCR Not Detected     Influenza B PCR Not Detected    Narrative:      Fact sheet for providers: https://www.fda.gov/media/927618/download    Fact sheet for patients: https://www.fda.gov/media/551370/download    Test performed by PCR.          CT Abdomen Pelvis With & Without Contrast    Result Date: 7/2/2022  DATE OF EXAM: 7/2/2022 4:37 PM  PROCEDURE: CT ABDOMEN PELVIS W WO CONTRAST-  INDICATIONS: GI BLEED PROTOCOL  COMPARISON: August 20, 2020, September 9, 2020  TECHNIQUE: Routine transaxial slices were obtained through the abdomen without the administration of intravenous contrast. Additional scanning through the abdomen and pelvis followed by the intravenous administration of 100 mL of Isovue 370. Reconstructed coronal and sagittal images were also obtained. Automated exposure control and iterative construction methods were used.   FINDINGS: There is no definite abnormality of the liver, spleen, pancreas  or right kidney. As noted on prior CT there is a hyperdense area in the left parapelvic area that may reflect a partially calcified cyst this measures 1.36 x 1.26 cm previously measuring 1.75 x 1.4 cm. There is no obstructive uropathy.  There is colonic diverticulosis without definitive changes of acute diverticulitis. There is no bowel obstruction. The bladder is grossly unremarkable. Patient has had a hysterectomy.  The visualized osseous structures do not appear unusual.      1.  There is a partially calcified lesion in the left parapelvic renal area which has been noted and may relate to partially calcified cyst or possibly sequela of an aneurysm. 2.  Colonic diverticulosis. 3.  Atherosclerotic changes are noted.  This report was finalized on 7/2/2022 5:08 PM by Vadim Banda MD.                    Plan for Follow-up of Pending Labs/Results:     Discharge Details        Discharge Medications      Continue These Medications      Instructions Start Date   acetaminophen 325 MG tablet  Commonly known as: TYLENOL   650 mg, Oral, Every 6 Hours PRN      FLINTSTONES MULTIVITAMIN PO   1 tablet, Oral, Daily      FLUoxetine 20 MG capsule  Commonly known as: PROzac   20 mg, Oral, Daily      levothyroxine 25 MCG tablet  Commonly known as: SYNTHROID, LEVOTHROID   25 mcg, Oral, Daily      lisinopril 40 MG tablet  Commonly known as: PRINIVIL,ZESTRIL   40 mg, Oral, Daily      metFORMIN 500 MG tablet  Commonly known as: GLUCOPHAGE   2,000 mg, Oral, Daily With Breakfast      rizatriptan MLT 10 MG disintegrating tablet  Commonly known as: MAXALT-MLT   10 mg, Translingual, As Needed      rosuvastatin 40 MG tablet  Commonly known as: CRESTOR   40 mg, Oral, Daily      valACYclovir 1000 MG tablet  Commonly known as: VALTREX   2,000 mg, Oral, 2 Times Daily PRN         Stop These Medications    ibuprofen 200 MG tablet  Commonly known as: ADVIL,MOTRIN            Allergies   Allergen Reactions   • Codeine Dizziness and Delirium     Feels  drunk   • Percocet [Oxycodone-Acetaminophen] Itching   • Adhesive Tape Dermatitis     Discharge Disposition: Home    Diet:  Hospital:  Diet Order   Procedures   • Diet Clear Liquid     Activity: As tolerated    Restrictions or Other Recommendations:  None       CODE STATUS:    Code Status and Medical Interventions:   Ordered at: 07/02/22 1943     Level Of Support Discussed With:    Patient     Code Status (Patient has no pulse and is not breathing):    CPR (Attempt to Resuscitate)     Medical Interventions (Patient has pulse or is breathing):    Full Support       No future appointments.    Maya Lazcano MD  07/04/22    Time Spent on Discharge:  I spent  25  minutes on this discharge activity which included: face-to-face encounter with the patient, reviewing the data in the system, coordination of the care with the nursing staff as well as consultants, documentation, and entering orders.

## 2022-07-04 NOTE — CASE MANAGEMENT/SOCIAL WORK
Case Management Discharge Note                Selected Continued Care - Admitted Since 7/2/2022     Destination    No services have been selected for the patient.              Durable Medical Equipment    No services have been selected for the patient.              Dialysis/Infusion    No services have been selected for the patient.              Home Medical Care    No services have been selected for the patient.              Therapy    No services have been selected for the patient.              Community Resources    No services have been selected for the patient.              Community & DME    No services have been selected for the patient.                       Final Discharge Disposition Code: 01 - home or self-care

## 2022-07-04 NOTE — PROGRESS NOTES
"GI Daily Progress Note  Subjective     Bhargavi Mohr is a 56 y.o. female who was admitted with GIB.  No furrther bleeding.    C/o epigastric pain        Chief Complaint: GiB    Objective     /88 (BP Location: Right arm, Patient Position: Lying)   Pulse 80   Temp 98.6 °F (37 °C) (Oral)   Resp 18   Ht 165.1 cm (65\")   Wt 71.7 kg (158 lb)   SpO2 97%   BMI 26.29 kg/m²     Intake/Output last 3 shifts:  I/O last 3 completed shifts:  In: 2320 [I.V.:2320]  Out: -   Intake/Output this shift:  I/O this shift:  In: 360 [P.O.:360]  Out: -       Physical Exam  Wt Readings from Last 3 Encounters:   07/02/22 71.7 kg (158 lb)   04/21/21 72.6 kg (160 lb)   10/09/20 70.3 kg (155 lb)   ,body mass index is 26.29 kg/m².,@FLOWAMB(6)@,@FLOWAMB(5)@,@FLOWAMB(8)@   CONSTITUTIONAL:  Resp CTA; no rhonchi, rales, or wheezes.  Respiration effort normal  CV RRR; no M/R/G. No lower extremity edema  GI Abd soft, NT, ND, normal active bowel sounds.    Psych:     DATA:   Latest Reference Range & Units 07/03/22 18:23 07/04/22 01:22 07/04/22 07:56   Hemoglobin 12.0 - 15.9 g/dL 9.0 (L) 9.3 (L) 10.1 (L)   Hematocrit 34.0 - 46.6 % 28.1 (L) 28.4 (L) 30.1 (L)   (L): Data is abnormally low    Assessment & Plan     Patient has been discharged              Type 2 diabetes mellitus (HCC)    Essential hypertension    Diverticulosis    GI bleed    HLD (hyperlipidemia)    Hypothyroidism (acquired)       LOS: 0 days     Jordon Cintron MD  07/04/22  10:29 EDT  "

## 2022-07-06 LAB
QT INTERVAL: 394 MS
QTC INTERVAL: 437 MS

## 2022-09-14 ENCOUNTER — TRANSCRIBE ORDERS (OUTPATIENT)
Dept: ADMINISTRATIVE | Facility: HOSPITAL | Age: 57
End: 2022-09-14

## 2022-09-14 DIAGNOSIS — Z12.31 VISIT FOR SCREENING MAMMOGRAM: Primary | ICD-10-CM

## 2022-10-11 ENCOUNTER — HOSPITAL ENCOUNTER (OUTPATIENT)
Dept: MAMMOGRAPHY | Facility: HOSPITAL | Age: 57
Discharge: HOME | End: 2022-10-11
Admitting: NURSE PRACTITIONER

## 2022-10-11 DIAGNOSIS — Z12.31 VISIT FOR SCREENING MAMMOGRAM: ICD-10-CM

## 2022-10-11 PROCEDURE — 77063 BREAST TOMOSYNTHESIS BI: CPT

## 2022-10-11 PROCEDURE — 77063 BREAST TOMOSYNTHESIS BI: CPT | Performed by: RADIOLOGY

## 2022-10-11 PROCEDURE — 77067 SCR MAMMO BI INCL CAD: CPT | Performed by: RADIOLOGY

## 2022-10-11 PROCEDURE — 77067 SCR MAMMO BI INCL CAD: CPT

## 2022-12-21 ENCOUNTER — E-VISIT (OUTPATIENT)
Dept: FAMILY MEDICINE CLINIC | Facility: TELEHEALTH | Age: 57
End: 2022-12-21
Payer: COMMERCIAL

## 2022-12-21 PROCEDURE — BRIGHTMDVISIT: Performed by: NURSE PRACTITIONER

## 2022-12-21 NOTE — EXTERNAL PATIENT INSTRUCTIONS
Note   Drink plenty of water Over the counter pain relievers okay If symptoms do not improve in 3-5 days follow up with your primary care provider or urgent care If symptoms worsen follow up with urgent care or the emergency room   Diagnosis   Influenza (the flu)   My name is Suzi Matias, and I'm a healthcare provider at [facility]. After reviewing your interview, I see that you may have influenza, or the flu.   With the ongoing COVID-19 pandemic, it can be hard to tell the difference between the flu and a COVID-19 infection. The two illnesses share many of the same symptoms, including fever, fatigue, muscle and/or body aches, and a cough. Most people with either illness have mild to moderate symptoms and can rest at home until they get better.   To prevent the spread of illness to others, I recommend that you stay home and away from other people as much as possible while you're sick. When you need to be around other people, consider wearing a face mask.   Here are the main things to know about your current symptoms:    You can use the medications recommended here to help ease your symptoms.   I haven't prescribed any antibiotics. Antibiotics fight bacteria, not the influenza virus. The following factors suggest that influenza, not a bacterial infection, is causing your symptoms:    Symptoms such as fatigue, sweats, chills, and aches are common with viral infections like the flu.    Your glands/lymph nodes are swollen or tender to the touch.   Medications   Your pharmacy   [id number] [address] [phone]     Prescription   Albuterol nebulization solution (0.083%): 2.5 mg via nebulizer machine every 6 hours as needed for wheezing. If symptoms are severe, may use as often as every 4 hours.   Oseltamivir (75mg): Take 1 capsule by mouth twice a day for 5 days for influenza (flu). Take it exactly as directed. You must finish the entire course of  medication, even if you feel better after the first few days of treatment.    Start taking this antiviral medication as soon as possible. The medication works best when started within 48 hours of getting sick. It won't get rid of your flu symptoms, but it will lessen the severity of your symptoms, shorten your illness by 1 to 2 days, and prevent serious complications.    The most common side effects of Tamiflu are nausea and vomiting. To lessen these side effects, take the medication with food. Tamiflu may rarely cause more serious side effects, such as behavior changes and delirium. If you have these serious side effects, stop taking the medication and speak to a provider immediately.   Medications won't cure the flu. However, they may help you feel better while your immune system fights the virus.   About your diagnosis   The flu is a contagious respiratory illness caused by influenza viruses that infect the nose, throat, and lungs. Although more than 200 different viruses can cause the common cold, there aren't as many that cause the flu. That's why there is a vaccine against the flu but not against the common cold. These are the key things to know about the flu:    Flu viruses spread mainly by droplets released into the air. When people with the flu cough, sneeze, or talk, these droplets can land in the mouths or noses of people nearby.    People can also get the flu by touching an object that has flu virus on it and then touching their own mouth, eyes, or nose.   What to expect   Follow the advice in the treatment section below and you should feel better within 3 to 7 days. You may continue to feel tired and have a cough for several weeks.   You can spread the flu before you know you're sick, even a full day before symptoms develop. Most adults can infect others up to a week after first becoming sick.   You can return to your normal activities when ALL of the following are true:    You've been fever-free for  more than 24 hours without using fever-reducing medications such as Tylenol    Your other symptoms have improved    It's been at least 5 full days since your symptoms first started   When to seek care   Call us at [phone]   with any sudden or unexpected symptoms.    Your fever climbs over 103F or continues for more than 3 days.    Bluish color to your lips.    Coughing up red or bloody mucus.    Severe chest pain.    Severe shortness of breath.    Sudden dizziness.    Confusion.    More than 5 episodes of vomiting in a day.    Convulsions or seizures.    Your throat pain becomes worse and makes swallowing extremely difficult or impossible.    Your sinus pain continues for 10 days or more, without improvement.    Symptoms that get better for a few days and then return with fever and worse cough.   Other treatment    Rest and drink plenty of sugar-free fluids.    Use a clean humidifier or a cool-mist vaporizer in your room at night. Breathing humid air may help with nasal congestion.    Gargle with salt water several times a day to help your throat feel better. Cough drops and throat lozenges may provide extra relief. A teaspoon of honey stirred into warm water or weak tea can help soothe a sore throat and cough.    Try using a Neti Pot to flush out your stuffy nose and sinuses. Neti Pots are available at any drugstore without a prescription.    Avoid smoke and air pollution. Smoke can make infections worse.   Prevention   The flu is very contagious and can be more dangerous for people with high-risk conditions. Because you've been diagnosed with the flu, people who live with you or are in close contact with you may need medication to protect them from the flu.   The following people are at high-risk for complications from the flu:    Children under the age of 5    Adults over the age of 65    Women who are pregnant or have given birth within the last 2 weeks    Residents of nursing homes or long-term care  facilities    Native Americans, including [place] Natives    People with the following medical conditions:    Asthma    Disorders of the brain, spinal cord, or nerves and muscles such as cerebral palsy, stroke, epilepsy, muscular dystrophy or developmental delay    Chronic lung disease such as COPD or cystic fibrosis    Heart disease such as congenital heart disease, congestive heart failure or coronary artery disease    Blood disorders such as sickle cell disease    Diabetes    Metabolic disorders such as inherited metabolic disorders or mitochondrial disease    Kidney disorders    Liver disorders    A weakened immune system due to illness or medications such as chemotherapy or steroids    People under the age of 19 who are on long-term aspirin therapy    Extreme obesity (BMI > 40)   If any of your close contacts fall into one of the categories above, they should speak with their healthcare provider as soon as possible. They may need to take antiviral medications to prevent the flu.   Avoid spreading the flu to others while you're sick:    Stay home and avoid contact with others.    Cover your mouth and nose with a tissue when you cough or sneeze. Put your used tissues in a waste basket immediately. If you don't have a tissue, cough or sneeze into your upper sleeve or elbow, not into your hands.    Wash your hands often with soap and water for at least 20 seconds. If soap and water aren't available, use an alcohol-based hand .    Regularly clean and disinfect surfaces and objects.   To prevent severe illness and serious complications from the flu:    Everyone 6 months of age and older should get a yearly flu vaccine.    Good hand hygiene is important! Wash your hands regularly, especially after using the bathroom, changing a diaper, blowing your nose, coughing or sneezing, and before eating.    Avoid close contact with people who are sick.    Coronavirus (COVID-19) information   Common symptoms of COVID-19  include fever, cough, shortness of breath, fatigue, muscle or body aches, headaches, new loss of sense of taste or smell, sore throat, stuffy or runny nose, nausea or vomiting, and diarrhea. Most people who get COVID-19 have mild symptoms and can rest at home until they get better. Elderly people and those with chronic medical problems may be at risk for more serious complications.   FAQs about the COVID-19 vaccine   There are four authorized COVID-19 vaccines: [employer] Mary Lou Vaccine (J&J/Mary Lou), Moderna, Novavax, and Pfizer-BioNTech (Pfizer). The J&J/Mary Lou and Novavax vaccines are approved for use in people aged 18 and older. The Moderna and Pfizer vaccines are approved for those aged 6 months and older. All four are available at no cost. Even if you don't have health insurance, you can still get the COVID-19 vaccine for free.   Which vaccine is the best? Which vaccine should I get?   All four vaccines are highly effective. Even if you get COVID-19 after being vaccinated, all of the vaccines help prevent severe disease, hospitalization, and complications.   Most people should get whichever vaccine is first available to them. However, women younger than 50 years old should consider the rare risk of blood clots with low platelets after vaccination with the J&J/Mary Lou vaccine. This risk hasn't been seen with the other three vaccines.   Are the vaccines safe?   Yes. Hundreds of millions of people in the [place] have already safely received COVID-19 vaccines under the most intense safety monitoring in the history of the [place].   Do I need the vaccine if I've already had COVID?   Yes. Vaccination helps protect you even if you've already had COVID.   If you had COVID-19 and had symptoms, wait to get vaccinated until you've recovered and completed your isolation period.   If you tested positive for COVID-19 but did not have symptoms, you can get vaccinated after 5 full days have passed since you had a positive  test, as long as you don't develop symptoms.   How many doses of the vaccine do I need?   Visit www.cdc.gov/coronavirus/2019-ncov/vaccines/stay-up-to-date.html   to find out how to stay up to date with your COVID-19 vaccines.   I'm immunocompromised. How many doses of the vaccine do I need?   For information on how immunocompromised people can stay up to date with their COVID-19 vaccines, visit www.cdc.gov/coronavirus/2019-ncov/vaccines/recommendations/immuno.html  .   What are the common side effects of the vaccine?   A sore arm, tiredness, headache, and muscle pain may occur within two days of getting the vaccine and last a day or two. For the Moderna or Pfizer vaccines, side effects are more common after the second dose. People over the age of 55 are less likely to have side effects than younger people.   After I'm up to date on vaccines, can I still get or spread COVID?   Yes, you can still get COVID, but your disease should be milder. And your risk of serious illness, hospitalization, and complications will be much lower, especially if you're up to date. Unfortunately, you can still spread COVID if you've been vaccinated. That's why it's important to follow isolation guidelines if you get sick or test positive.   After I'm up to date on vaccines, can I go back to normal?   You should still wear a mask indoors in public if:    It's required by laws, rules, regulations, or local guidance.    You have a weakened immune system.    Your age puts you at increased risk of severe disease.    You have a medical condition that puts you at increased risk of severe disease.    Someone in your household has a weakened immune system, is at increased risk for severe disease, or is unvaccinated.    You're in an area of high transmission.   Where can I get a COVID-19 vaccine?   Visit [facility]'s website for more information. To find a COVID-19 vaccination site near you, visit www.vaccines.gov/  , call [phone]  , or  text your zip code to 774777 ([employer]). Message and data rates may apply.   What about travel?   Travel increases your risk of exposure to COVID-19. For more information, see www.cdc.gov/coronavirus/2019-ncov/travelers/index.html  .   I've had close contact with someone who has COVID. Do I need to quarantine, and if so, for how long?   For the most current answer, including a calculator to determine whether you need to stay home and for how long, visit www.cdc.gov/coronavirus/2019-ncov/your-health/quarantine-isolation.html  .   I've tested positive for COVID. How long do I need to isolate?   For the latest recommendations, including a calculator to determine how long you need to stay home, visit www.cdc.gov/coronavirus/2019-ncov/your-health/quarantine-isolation.html  .   What if I develop symptoms that might be from COVID?   For the latest recommendations on what to do if you're sick, including when to seek emergency care, visit www.cdc.gov/coronavirus/2019-ncov/if-you-are-sick/steps-when-sick.html  .   Your provider   Your diagnosis was provided by Suzi Matias, a member of your trusted care team at [facility].   If you have any questions, call us at [phone]  .

## 2022-12-21 NOTE — E-VISIT TREATED
Chief Complaint: Coronavirus (COVID-19), cold, sinus pain, allergy, or flu   Patient introduction   Patient is 57-year-old female with cough, fever (which may have resolved; see below), congestion, nasal discharge, sore throat, sweats, chills, myalgia, and fatigue that started less than 48 hours ago. Regarding date of symptom onset, patient writes: 12/20/2022.   COVID-19 exposure, testing history, and vaccination status:    No known exposure to a person with a confirmed or suspected case of COVID-19.    No recent travel outside of their local community.    Patient had a self-test within the last week. Patient specifies date of test as 12/19/2022. Test result was negative.    Received 2 doses of the COVID-19 vaccine (Moderna, Moderna).   Received their most recent dose of the vaccine more than 14 days ago.   Risk factors for severe disease from COVID-19 infection:    BMI >= 25.    Does not smoke tobacco; smoked previously.    Diabetes.   Warning. The following may warrant further investigation:    Hypertension.   Patient-submitted comments: Chest burns when I cough.   Patient did not request an excuse note.   General presentation   Symptoms came on gradually.   Fever:    Has had less than 24 hours of measured fever.    Has had current measured fever since initial symptom onset.    Highest temperature of 100.4F to 101.5F.   Sinus and nasal symptoms:    Nasal discharge.    Clear nasal drainage.    Nasal drainage is thick.    Postnasal drip.    Congestion with sinus pain or pressure on or around the cheeks.    Patient first noticed sinus pain less than 5 days ago.    Sinus pain is not worse with Valsalva.    No itchy nose or sneezing.    No history of unhealed nasal septal ulcer/nasal wound.    No history of antibiotic treatment for sinus infection in the last year.    No history of deviated septum or nasal polyps.   Throat symptoms:    Sore throat.    Previous tonsillectomy.    No known recent strep exposure.     Patient does not think they have strep.    Has discomfort when swallowing but can swallow liquids and solid foods.    No muffled voice.   Head and body aches:    Sweats.    Chills.    Myalgia.    Fatigue.    No headache.   Cough:    Cough is not noticeably worse depending on time of day    Cough is mildly productive of sputum.    Describes color of sputum as clear.   Wheezing and shortness of breath:    Wheezing.    Has previously used an albuterol inhaler for URI, bronchitis, or pneumonia.    Has previously used a steroid inhaler for URI, bronchitis, or pneumonia.    Not using an albuterol inhaler for current symptoms because they do not have any available.    Patient requests a prescription of albuterol in the form of solution for a nebulizer.    No COPD diagnosis.    No asthma diagnosis.    No shortness of breath.   Chest pain:    No chest pain.   Ear symptoms:    Current symptoms include pain, pressure, fullness, and a plugged or blocked sensation in the ear(s).   Dizziness:    No dizziness.   Allergies:    Patient has known seasonal allergies.    Patient does not think symptoms are allergy-related.   Flu exposure:    Recent household, close-proximity, near-proximity, and distant-proximity exposure to a person with a confirmed flu diagnosis.    Has not had a flu vaccine this season.   Patient is taking over-the-counter medications for current symptoms, including acetaminophen.   Review of red flags/alarm symptoms:    No changes in alertness or awareness.    No symptoms suggesting airway obstruction.    No muffled voice.    No fever above 100.4F lasting longer than 4 days.    No decreased urination.   Risk factors for antibiotic resistance:    Diabetes.   Pregnancy/menstrual status/breastfeeding:    Not pregnant.    Not breastfeeding.    Regarding last menstrual period, patient writes: Hysterectomy in 2020.   Self-exam:    Height: 165 centimeters    Weight: 71.6 kilograms    No difficulty moving their chin  toward their chest.    No palatal petechiae.    Able to open mouth normally.    Swollen/painful neck lymph nodes.    Has not taken antibiotics for similar symptoms within the past month.   Current medications   Currently taking lisinopril 40 MG tablet, acetaminophen 325 MG tablet, FLUoxetine 20 MG capsule, rosuvastatin 40 MG tablet, metFORMIN 500 MG tablet, levothyroxine 25 MCG tablet, and valACYclovir 1000 MG tablet.   Medication allergies   None.   Medication contraindication review   Patient has history of hypertension. Therefore, the following medication(s) will not be prescribed:    Metoclopramide.    Acetaminophen-diphenhydramine-phenylephrine.    Aspirin-chlorpheniramine-phenylephrine.   No history of metoclopramide-associated dystonic reaction and tardive dyskinesia.   No known history of amoxicillin-clavulanate-associated cholestatic jaundice or hepatic impairment.   No known history of azithromycin-associated cholestatic jaundice or hepatic impairment.   Past medical history   Immune conditions: No immunocompromising conditions. Patient is in remission from cancer. Patient is not getting current treatment for cancer.   Social history   Does not smoke tobacco; smoked previously.   Assessment   Influenza. Ruled out: Traumatic laryngitis.   This is the likely diagnosis based on patient's interview responses and symptoms, including:    Current fever    Sweats, chills, myalgia, fatigue, and/or headache   Patient is at higher risk for influenza complications due to the following:    Diabetes.   Other indications for antiviral treatment:    Symptom onset less than 48 hours ago.   Plan   Medications:    albuterol sulfate 2.5 mg/3 mL (0.083 %) solution for nebulization RX 2.5 mg/3 mL 3 mL NEB q6h PRN 10d for wheezing. If symptoms are severe, may use as often as every 4 hours. Amount is 120 mL.    oseltamivir 75 mg capsule RX 75mg 1 cap PO bid 5d for influenza (flu). Take it exactly as directed. You must finish the  entire course of medication, even if you feel better after the first few days of treatment. Amount is 10 cap.   The patient's prescriptions will be sent to:   [id number]   [address] [facility] [id number]   Phone: [phone]     Fax: [phone]   Education:    Condition and causes    Prevention    Treatment and self-care    When to call provider   ----------   Electronically signed by TAMMY Linda on 2022-12-21 at 11:39AM   ----------   Patient Interview Transcript:   Please carefully consider each question and answer as best you can. This helps your provider give you the best care. Which of these symptoms are bothering you? Select all that apply.    Cough    Fever    Stuffed-up nose or sinuses    Runny nose    Sore throat    Sweats    Chills    Muscle or body aches    Fatigue or tiredness   Not selected:    Shortness of breath    Itchy or watery eyes    Itchy nose or sneezing    Loss of smell or taste    Hoarse voice or loss of voice    Headache    Nausea or vomiting    Diarrhea    I don't have any of these symptoms   Since your current symptoms started, have you been tested for COVID-19? Select one.    Yes   Not selected:    No   When was your most recent COVID-19 test? Select one.    Within the last week (specify date as MM/DD/YY): 12/19/2022   Not selected:    7 to 14 days ago    15 to 30 days ago    More than 1 month ago   What type of COVID-19 test did you most recently have? There are two types of COVID-19 tests: - Viral tests check if you're currently infected with COVID-19. For these tests, a nose swab or saliva sample is taken. Viral tests include self-tests and tests done at a doctor's office, lab, or testing site. - Antibody tests check if you've been infected in the past. For these tests, your blood is drawn. Antibody tests can only be done at a doctor's office, lab, or testing site. Select one.    Viral self-test   Not selected:    Viral test at a doctor's  "office, lab, or testing site    Antibody test   What was the result of your most recent COVID-19 test? Select one.    Negative   Not selected:    Positive    I'm not sure   Have you gotten the COVID-19 vaccine? Select one.    Yes   Not selected:    No   How many total doses of the COVID-19 vaccine have you gotten? This includes boosters as well as additional doses for those who are immunocompromised. Select one.    2 doses   Not selected:    1 dose    3 doses    4 doses    5 doses   1st dose    Moderna   Not selected:    J&J/Mary Lou    Novavax    Pfizer   2nd dose    Moderna   Not selected:    J&J/Mary Lou    Novavax    Pfizer   When did you get your most recent dose of the COVID-19 vaccine?    More than 14 days ago   Not selected:    Less than 48 hours (2 days) ago    48 to 72 hours (3 days) ago    3 to 5 days ago    5 to 7 days ago    7 to 14 days ago   In the last 14 days, have you traveled outside of your local community? This includes travel by car, RV, bus, train, or plane. Travel increases your chances of getting and spreading COVID-19. Select one.    No   Not selected:    Yes   In the last 14 days, have you had close contact with someone who has coronavirus (COVID-19)? \"Close contact\" means any of these: - Living in the same household as someone with COVID-19. - Caring for someone with COVID-19. - Being within 6 feet of someone with COVID-19 for a total of at least 15 minutes over a 24-hour period. For example, three 5-minute exposures for a total of 15 minutes. - Being in direct contact with respiratory droplets from someone with COVID-19 (being coughed on, kissing, sharing utensils). Select one.    No, not that I know of   Not selected:    Yes, a confirmed case    Yes, a suspected case   When did your current symptoms start? Select one.    Less than 48 hours ago   Not selected:    3 to 5 days ago    6 to 9 days ago    10 to 14 days ago    2 to 3 weeks ago    3 to 4 weeks ago    More than a month ago   Do " you know the exact date your symptoms started? If so, enter the date as MM/DD/YY. Select one.    Yes (specify): 12/20/2022   Not selected:    No   Did your symptoms come on suddenly or gradually? Select one.    Gradually   Not selected:    Suddenly    I'm not sure   You mentioned having a fever. Do you have a fever now? Select one.    Yes, and I've had one since my symptoms started   Not selected:    Yes, but I didn't have one when my symptoms started    No, it's gone now   Did you take your temperature with a thermometer? Select one.    Yes   Not selected:    No, but it felt mild    No, but it felt high   What was the highest reading on the thermometer? Select one.    100.4 to 101.5F   Not selected:    Below 100.4F    101.6 to 101.9F    102.0 to 103.0F    Above 103.0F   How long have you had a fever? Select one.    Less than 24 hours   Not selected:    1 to 3 days    4 or more days   Do you cough so hard that it's made you gag or vomit? By gag, we mean has your coughing made you choke or dry heave? Select all that apply.    Yes, my coughing has made me gag   Not selected:    Yes, my coughing has made me vomit    No   When is your cough the worst? Select all that apply.    I haven't noticed a difference depending on time of day   Not selected:    In the morning, or when I wake up    During the day    At nighttime, or while I'm sleeping   Are you coughing up mucus or phlegm? Select one.    Yes, a little   Not selected:    No, my cough is dry    Yes, a lot   What color is most of the mucus or phlegm that you're coughing up? Select one.    Clear   Not selected:    White/frothy    Yellow    Green    Red or pink    I'm not sure   You mentioned having a stuffy nose or sinus congestion. Do you feel pain or pressure in your sinuses?    Yes   Not selected:    No   Where do you feel sinus pain or pressure?    In my cheeks   Not selected:    In my forehead    Around my eyes    Behind my nose    In my upper teeth or jaw    I'm  "not sure   When did you first notice your sinus pain or pressure? Select one.    Less than 5 days ago   Not selected:    5 to 9 days ago    10 to 14 days ago    2 to 4 weeks ago    1 month ago or longer   Does coughing, sneezing, or leaning forward make your sinuses feel worse? Select one.    No   Not selected:    Yes   What color is your nasal drainage? Select one.    Clear   Not selected:    White    Yellow    Green    My nose is stuffed but not draining or running    I'm not sure   Is your nasal drainage thick or thin? Select one.    Thick   Not selected:    Thin   Is there any drainage (mucus) going down the back of your throat? This kind of drainage is also called \"postnasal drip.\" Select one.    Yes   Not selected:    No, not that I know of   Can you swallow liquids and solid foods? A sore throat may be painful when swallowing, but it shouldn't prevent you from swallowing. Select one.    Yes, but it's uncomfortable   Not selected:    Yes, with ease    Yes, but it's painful    It's hard to swallow anything because it feels like liquids and food get stuck in my throat    No, I can't swallow anything, liquid or solid foods   Since your symptoms started, have you felt dizzy? Select one.    No   Not selected:    Yes, but I can continue with my regular daily activities    Yes, and it makes it hard to stand, walk, or do daily activities   Do you have chest pain? You might also feel it as discomfort, aching, tightness, or squeezing in the chest. Select one.    No   Not selected:    Yes   Have you urinated at least 3 times in the last 24 hours? Select one.    Yes   Not selected:    No   Changes in alertness or awareness may mean you need emergency care. Since your symptoms started, have you had any of these? Select all that apply.    None of the above   Not selected:    Confusion    Slurred speech    Not knowing where you are or what day it is    Difficulty staying conscious    Fainting or passing out   Do your " "symptoms include a whistling sound, or wheezing, when you breathe? Select one.    Yes   Not selected:    No    I'm not sure   Do you have any of these symptoms in your ear(s)? Select all that apply.    Pain    Pressure    Fullness    Plugged or blocked sensation   Not selected:    Crackling or popping    None of the above   Can you move your chin toward your chest?    Yes   Not selected:    No, my neck is too stiff   Try opening your mouth as wide as you can. Are you able to open your mouth all the way as you normally would? Select one.    Yes   Not selected:    No   Does your voice sound muffled? \"Muffled\" here does not mean hoarse or scratchy. By \"muffled,\" we mean that it sounds like you're speaking with a mouth full of hot food. Select one.    No, not that I can tell   Not selected:    Yes   Are your tonsils larger than usual?    I've had my tonsils removed   Not selected:    Yes    No, not that I can tell   Are there red spots on the roof of your mouth or the back of your throat?    No, not that I can see   Not selected:    Yes   Are your glands/lymph nodes swollen, or does it hurt when you touch them?    Yes   Not selected:    No, not that I can tell   In the past 2 weeks, has anyone around you (such as at school, work, or home) had a confirmed diagnosis of strep throat? A confirmed diagnosis means that a throat swab and lab test were done to verify a strep throat infection. Select one.    No, not that I know of   Not selected:    Yes   Do you think you might have strep throat? Select one.    No   Not selected:    Yes   In the past week, has anyone around you (such as at school, work, or home) had a confirmed diagnosis of the flu? A confirmed diagnosis means that a nose swab was done to verify a flu infection. Select all that apply.    I live with someone who has the flu    I've been within touching distance of someone who has the flu    I've walked by, or sat about 3 feet away from, someone who has the flu   "  I've been in the same building as someone who has the flu   Not selected:    No, not that I know of   Have you ever been diagnosed with asthma? Select one.    No   Not selected:    Yes   Have you ever been prescribed albuterol to use for wheezing, cough, or shortness of breath caused by a cold, bronchitis, or pneumonia? Albuterol (ProAir, Proventil, Ventolin) is prescribed as an inhaler or a solution to be used with a nebulizer machine. Select one.    Yes   Not selected:    No, not that I know of   Have you ever been prescribed a steroid inhaler to use for wheezing, cough, or shortness of breath caused by a cold, bronchitis, or pneumonia? Some examples of steroid inhalers include Pulmicort, Flovent, Qvar, and Alvesco. Select one.    Yes   Not selected:    No, not that I know of   Have you used albuterol for your current symptoms? This includes both albuterol inhalers and albuterol solution in a nebulizer machine. Select one.    No, I don't have any, or it's    Not selected:    Yes    No, I don't feel like I need it   Do you need a refill of albuterol? Select one.    Yes   Not selected:    No   What form of albuterol do you need? Select one.    Nebulizer solution   Not selected:    Inhaler   Have you ever been diagnosed with chronic obstructive pulmonary disease (COPD)? Select one.    No, not that I know of   Not selected:    Yes   In the past month, have you taken antibiotics for similar symptoms? Examples of antibiotics include amoxicillin, amoxicillin-clavulanate (Augmentin), penicillin, cefdinir (Omnicef), doxycycline, and clindamycin (Cleocin). Select one.    No   Not selected:    Yes   In the last year, how many times were you treated with antibiotics for a sinus infection? Select one.    None   Not selected:    1 to 3 times    4 or more times   Have you been diagnosed with a deviated septum or nasal polyps? The nose is divided into two nostrils by the septum. A crooked septum is called a deviated  septum. Nasal polyps are growths inside the nose or sinuses. Select one.    No, not that I know of   Not selected:    Yes, but I had surgery to treat them    Yes, I have a deviated septum    Yes, I have nasal polyps    Yes, I have a deviated septum and nasal polyps   Do you have a sore inside your nose that won't heal? Select one.    No, not that I know of   Not selected:    Yes   Do you have allergies (pollen, dust mites, mold, animal dander)? Select one.    Yes   Not selected:    No, not that I know of   What kind of allergies do you have? Select all that apply.    Seasonal allergies (hay fever)   Not selected:    Pet allergies    Dust allergies    None of the above    I'm not sure   Do you think your symptoms could be allergy-related? Select one.    No   Not selected:    Yes    I'm not sure   Have you had a flu shot this season? Select one.    No, not that I know of   Not selected:    Yes, less than 2 weeks ago    Yes, 2 to 4 weeks ago    Yes, 1 to 3 months ago    Yes, 3 to 6 months ago    Yes, more than 6 months ago   Have you gone through menopause? Select one.    No   Not selected:    Yes    I'm going through it now   Are you pregnant? Select one.    No   Not selected:    Yes   When was your last menstrual period? If you don't currently have periods or no longer have periods, please briefly explain.    Hysterectomy in 2020   Within the last 2 weeks, have you: - Given birth - Had a miscarriage - Had a pregnancy loss - Had an  Being postpartum (live birth or loss) within the last 2 weeks increases your risk of flu complications. Select one.    No   Not selected:    Yes   Are you breastfeeding? Select one.    No   Not selected:    Yes   The flu and COVID-19 can be more serious for people with certain conditions or characteristics. These questions help us figure out if you or anyone you live with is at higher risk for complications from these infections. Do either of these statements apply to you? Select  all that apply.    None of the above   Not selected:    I'm  or Native [place]    I'm a healthcare worker   Do you smoke tobacco? Select one.    No, I quit   Not selected:    Yes, every day    Yes, some days    No   Do you have any of these conditions? Select all that apply.    None of the above   Not selected:    Chronic lung disease, such as cystic fibrosis or interstitial fibrosis    Heart disease, such as congenital heart disease, congestive heart failure, or coronary artery disease    Disorder of the brain, spinal cord, or nerves and muscles, such as dementia, cerebral palsy, epilepsy, muscular dystrophy, or developmental delay    Metabolic disorder or mitochondrial disease    Cerebrovascular disease, such as stroke or another condition affecting the blood vessels or blood supply to the brain    Down syndrome    Mood disorder, including depression or schizophrenia spectrum disorders    Substance use disorder, such as alcohol, opioid, or cocaine use disorder    Tuberculosis   Do you live in a group care setting? Examples include: - Nursing home - Residential care - Psychiatric treatment facility - [facility] - Board and care home - Homeless shelter - Foster care setting Select one.    No   Not selected:    Yes   Are you a healthcare worker? Select one.    No   Not selected:    Yes   People with a very high body mass index (BMI) are at higher risk for developing complications from the flu and severe illness from COVID-19. To determine your BMI, we need to know your weight and height. Please enter your weight (in pounds).    Weight   Please enter your height.    Height   Do you have any of these conditions that can affect the immune system? Scroll to see all options. Select all that apply.    None of these   Not selected:    History of bone marrow transplant    Chronic kidney disease    Chronic liver disease (including cirrhosis)    HIV/AIDS    Inflammatory bowel disease (Crohn's disease  or ulcerative colitis)    Lupus    Moderate to severe plaque psoriasis    Multiple sclerosis    Rheumatoid arthritis    Sickle cell anemia    Alpha or beta thalassemia    History of solid organ transplant (kidney, liver, or heart)    History of spleen removal    An autoimmune disorder not listed here    A condition requiring treatment with long-term use of oral steroids (such as prednisone, prednisolone, or dexamethasone)   Have you ever been diagnosed with cancer? Select one.    Yes, but I'm in remission   Not selected:    Yes, I have cancer now    No   What kind of cancer treatment are you getting? Select all that apply.    None   Not selected:    Chemotherapy    Radiation therapy    Immunotherapy    Hormone therapy, such as Tamoxifen, Arimidex, or Femara    A treatment not listed here    I'm getting treatment, but I don't know what it is   Do any of these apply to you? Select all that apply.    I have diabetes   Not selected:    I've been hospitalized within the last 5 days    I'm in close contact with a child in     None of the above   Do any of these apply to the people who live with you? Select all that apply.    None of the above   Not selected:    A child under the age of 5    An adult 65 or older    A person who is pregnant    A person who has given birth, had a miscarriage, had a pregnancy loss, or had an  in the last 2 weeks    An  or Native [place]   Does any member of your household have any of these medical conditions? Select all that apply.    None of the above   Not selected:    Asthma    Disorders of the brain, spinal cord, or nerves and muscles, such as dementia, cerebral palsy, epilepsy, muscular dystrophy, or developmental delay    Chronic lung disease, such as COPD or cystic fibrosis    Heart disease, such as congenital heart disease, congestive heart failure, or coronary artery disease    Cerebrovascular disease, such as stroke or another condition affecting the  blood vessels or blood supply to the brain    Blood disorders, such as sickle cell disease    Diabetes    Metabolic disorders such as inherited metabolic disorders or mitochondrial disease    Kidney disorders    Liver disorders    Weakened immune system due to illness or medications such as chemotherapy or steroids    Children under the age of 19 who are on long-term aspirin therapy    Extreme obesity (BMI > 40)   Do you have any of these conditions? Scroll to see all options. Select all that apply.    High blood pressure   Not selected:    Aspirin triad (also known as Samter's triad or ASA triad)    Asthma or hives from taking aspirin or other NSAIDs, such as ibuprofen or naproxen    Blockage or narrowing of the blood vessels of the heart    Blood dyscrasia, such anemia, leukemia, lymphoma, or myeloma    Bone marrow depression    Catecholamine-releasing paraganglioma    Blood clotting disorder    Congenital long QT syndrome    Depression    Difficulty urinating or completely emptying your bladder    Uncorrected electrolyte abnormalities    Fungal infection    Gastrointestinal (GI) bleeding    Gastrointestinal (GI) obstruction    G6PD deficiency    Recent heart attack    Irregular heartbeat or heart rhythm    Mononucleosis (mono)    Myasthenia gravis    Parkinson's disease    Pheochromocytoma    Reye syndrome    Seizure disorder    Ulcerative colitis    None of the above   Have you ever had either of these conditions? Select all that apply.    No   Not selected:    Metoclopramide-associated dystonic reaction    Tardive dyskinesia   Just a few more questions about medications, and then you're finished. Have you used any non-prescription medications or nasal sprays for your current symptoms? Examples include saline sprays, decongestants, NyQuil, and Tylenol. Select one.    Yes   Not selected:    No   Which of these non-prescription medications have you tried? Scroll to see all options. Select all that apply.     Acetaminophen (Tylenol)   Not selected:    Budesonide (Rhinocort)    Cetirizine (Zyrtec)    Chlorpheniramine (Aller-chlor, Chlor-Trimeton)    Cromolyn (NasalCrom)    Dextromethorphan (Delsym, Robitussin, Vicks DayQuil Cough)    Diphenhydramine (Benadryl)    Fexofenadine (Allegra)    Fluticasone (Flonase)    Guaifenesin (Mucinex)    Guaifenesin/dextromethorphan (Delsym DM, Mucinex DM, Robitussin DM)    Ibuprofen (Advil, Motrin, Midol)    Ketotifen (Alaway, Zaditor)    Loratadine (Alavert, Claritin)    Naphazoline-pheniramine (Naphcon-A, Opcon-A, Visine-A)    Omeprazole (Prilosec)    Oxymetazoline (Afrin)    Phenylephrine (Sudafed)    Triamcinolone (Nasacort)    None of the above   Have you taken any monoamine oxidase inhibitor (MAOI) medications in the last 14 days? Examples include rasagiline (Azilect), selegiline (Eldepryl, Zelapar), isocarboxazid (Marplan), phenelzine (Nardil), and tranylcypromine (Parnate). Select one.    No, not that I know of   Not selected:    Yes   Do you take Kynmobi or Apokyn (apomorphine)? Select one.    No   Not selected:    Yes   Are you still taking these medications listed in your medical record? If you're not taking any of these, click Next. Select all that apply.    lisinopril 40 MG tablet    acetaminophen 325 MG tablet    FLUoxetine 20 MG capsule    rosuvastatin 40 MG tablet    metFORMIN 500 MG tablet    levothyroxine 25 MCG tablet    valACYclovir 1000 MG tablet   Are you taking any other medications, vitamins, or supplements? Select one.    No   Not selected:    Yes   Have you ever had an allergic or bad reaction to any medication? Select one.    No   Not selected:    Yes   Are you allergic to milk or to the proteins found in milk (for example, whey or casein)? A milk allergy is different from lactose intolerance. Select one.    No, not that I know of   Not selected:    Yes   Have you ever had jaundice or liver problems as a result of taking amoxicillin-clavulanate (Augmentin)?  Jaundice is a condition in which the skin and the whites of the eyes turn yellow. Select all that apply.    No, not that I know of   Not selected:    Yes, jaundice    Yes, liver problems   Have you ever had jaundice or liver problems as a result of taking azithromycin (Zithromax, Zmax)? Jaundice is a condition in which the skin and the whites of the eyes turn yellow. Select all that apply.    No, not that I know of   Not selected:    Yes, jaundice    Yes, liver problems   Do you need a doctor's note? A doctor's note confirms that you received care today and states when you can return to school or work. It does not contain information about your diagnosis or treatment plan. Your provider will make the final decision on whether to give you a doctor's note and for how long. Doctor's notes CANNOT be backdated. We can't provide medical leave paperwork through this type of visit. If more paperwork is needed to request time off, contact your primary care provider. Select one.    No   Not selected:    Today only (1 day)    Today and tomorrow (2 days)    3 days    5 days    7 days    10 days    14 days   Is there anything else you'd like to tell us about your symptoms?    Chest burns when I cough   ----------   Medical history   Medical history data does not currently exist for this patient.

## 2023-05-15 ENCOUNTER — OFFICE VISIT (OUTPATIENT)
Dept: NEUROLOGY | Facility: CLINIC | Age: 58
End: 2023-05-15
Payer: COMMERCIAL

## 2023-05-15 VITALS
HEART RATE: 84 BPM | OXYGEN SATURATION: 97 % | DIASTOLIC BLOOD PRESSURE: 82 MMHG | SYSTOLIC BLOOD PRESSURE: 134 MMHG | WEIGHT: 151 LBS | BODY MASS INDEX: 25.16 KG/M2 | HEIGHT: 65 IN

## 2023-05-15 DIAGNOSIS — G50.0 TRIGEMINAL NEURALGIA: Primary | ICD-10-CM

## 2023-05-15 DIAGNOSIS — G43.009 MIGRAINE WITHOUT AURA AND WITHOUT STATUS MIGRAINOSUS, NOT INTRACTABLE: ICD-10-CM

## 2023-05-15 PROCEDURE — 99214 OFFICE O/P EST MOD 30 MIN: CPT | Performed by: NURSE PRACTITIONER

## 2023-05-15 RX ORDER — OXCARBAZEPINE 150 MG/1
150 TABLET, FILM COATED ORAL 2 TIMES DAILY
Qty: 180 TABLET | Refills: 3 | Status: SHIPPED | OUTPATIENT
Start: 2023-05-15 | End: 2024-05-14

## 2023-05-15 RX ORDER — CLONIDINE HYDROCHLORIDE 0.1 MG/1
TABLET ORAL
COMMUNITY
Start: 2023-04-13

## 2023-05-15 RX ORDER — LISINOPRIL 30 MG/1
1 TABLET ORAL DAILY
COMMUNITY
Start: 2023-02-12

## 2023-05-15 RX ORDER — SEMAGLUTIDE 1.34 MG/ML
INJECTION, SOLUTION SUBCUTANEOUS
COMMUNITY
Start: 2023-04-26

## 2023-05-15 RX ORDER — AMLODIPINE BESYLATE 5 MG/1
1 TABLET ORAL DAILY
COMMUNITY
Start: 2023-05-09

## 2023-05-15 NOTE — PROGRESS NOTES
Neuro Office Visit      Encounter Date: 05/15/2023   Patient Name: Bhargavi Mohr  : 1965   MRN: [id number]   PCP:  Shelly Bauman APRN     Chief Complaint:    Chief Complaint   Patient presents with   • Headache       History of Present Illness: Bhargavi Mohr is a 57 y.o. female who is here today in Neurology for facial pain.    Woke on 2023 in the middle of the night with a sudden shocklike pain through her right eye.  She described the pain as feeling as though an ice pick were being stabbed through her right eye.    She was seen by her primary care provider and was noted to be hypertensive but even after blood pressure was controlled she has continued to have the pain.    She has also had the pain occur from occipital area straight through her head and through her left eye.    She has had up to 50 episodes in a day and often occur in clusters where she will have them for 2 or 3 days.    Denies any triggering factors.  She has not noticed any pain with chewing, touching her face, or cold bringing on the episodes.    Subjective      Past Medical History:   Past Medical History:   Diagnosis Date   • Anxiety    • Cluster headache    • Diabetes     diet and exercise controlled    • Diverticulitis    • Headache, tension-type    • History of right salpingo-oophorectomy 2018   • HL (hearing loss) 2018    Broken bones in right ear   • Hyperlipidemia     Controlled with medication   • Hypertension    • Melanoma    • Migraine    • PONV (postoperative nausea and vomiting)    • Positive TB test    • Wears glasses        Past Surgical History:   Past Surgical History:   Procedure Laterality Date   • APPENDECTOMY     • CERVICAL CONIZATION     • COLONOSCOPY  2019 DEC    • COLONOSCOPY N/A 2022    Procedure: COLONOSCOPY;  Surgeon: Jordon Cintron MD;  Location: [facility] ENDOSCOPY;  Service: Gastroenterology;  Laterality: N/A;   • ENDOSCOPY     • OOPHORECTOMY Right 2018   • SKIN  CANCER EXCISION      melanoma   • TONSILLECTOMY AND ADENOIDECTOMY     • TOTAL LAPAROSCOPIC HYSTERECTOMY SALPINGO OOPHORECTOMY Left 2020    Procedure: TOTAL LAPAROSCOPIC HYSTERECTOMY LEFT SALPINGOOPHORECTOMY WITH DAVINCI ROBOT;  Surgeon: Ne Marmolejo MD;  Location: [facility];  Service: Microinox;  Laterality: Left;       Family History:   Family History   Problem Relation Age of Onset   • Heart disease Mother    • Migraines Mother         Stopped in mid-twenties   • Stroke Mother    • Colon cancer Father    • Breast cancer Neg Hx    • Ovarian cancer Neg Hx        Social History:   Social History     Socioeconomic History   • Marital status:    • Number of children: 2   Tobacco Use   • Smoking status: Former     Packs/day: 1.00     Years: 20.00     Pack years: 20.00     Types: Cigarettes     Start date: 1986     Quit date: 2006     Years since quittin.8   • Smokeless tobacco: Never   • Tobacco comments:     No longer use tobacco, but do vape occassionally   Vaping Use   • Vaping Use: Every day   Substance and Sexual Activity   • Alcohol use: Yes     Comment: Less than one per week.   • Drug use: Never   • Sexual activity: Yes     Partners: Male     Birth control/protection: Hysterectomy       Medications:     Current Outpatient Medications:   •  acetaminophen (TYLENOL) 325 MG tablet, Take 2 tablets by mouth Every 6 (Six) Hours As Needed for Mild Pain ., Disp: 60 tablet, Rfl: 2  •  amLODIPine (NORVASC) 5 MG tablet, Take 1 tablet by mouth Daily., Disp: , Rfl:   •  cloNIDine (CATAPRES) 0.1 MG tablet, TAKE 1 TABLET BY MOUTH EVERY NIGHT AT BEDTIME AND EVERY 8 HOURS IF BLOOD PRESSURE IS OVER 160/104, Disp: , Rfl:   •  FLUoxetine (PROzac) 20 MG capsule, Take 1 capsule by mouth Daily., Disp: , Rfl:   •  levothyroxine (SYNTHROID, LEVOTHROID) 25 MCG tablet, Take 1 tablet by mouth Daily., Disp: , Rfl:   •  lisinopril (PRINIVIL,ZESTRIL) 30 MG tablet, Take 1 tablet by mouth Daily., Disp: , Rfl:   •   Ozempic, 1 MG/DOSE, 4 MG/3ML solution pen-injector, , Disp: , Rfl:   •  rosuvastatin (CRESTOR) 40 MG tablet, Take 1 tablet by mouth Daily., Disp: , Rfl:   •  valACYclovir (VALTREX) 1000 MG tablet, Take 2 tablets by mouth 2 (Two) Times a Day As Needed (as needed for fever blisters)., Disp: , Rfl:   •  OXcarbazepine (TRILEPTAL) 150 MG tablet, Take 1 tablet by mouth 2 (Two) Times a Day., Disp: 180 tablet, Rfl: 3    Allergies:   Allergies   Allergen Reactions   • Codeine Dizziness and Delirium     Feels drunk   • Percocet [Oxycodone-Acetaminophen] Itching   • Adhesive Tape Dermatitis       PHQ-9 Total Score:     STEJOSHUA Fall Risk Assessment has not been completed.    Objective     Physical Exam:   Physical Exam  Vitals reviewed.   Eyes:      Extraocular Movements: EOM normal.      Pupils: Pupils are equal, round, and reactive to light.   Neurological:      Mental Status: She is oriented to person, place, and time.      Gait: Gait is intact.      Deep Tendon Reflexes:      Reflex Scores:       Tricep reflexes are 2+ on the right side and 2+ on the left side.       Bicep reflexes are 2+ on the right side and 2+ on the left side.       Brachioradialis reflexes are 2+ on the right side and 2+ on the left side.       Patellar reflexes are 2+ on the right side and 2+ on the left side.       Achilles reflexes are 2+ on the right side and 2+ on the left side.  Psychiatric:         Speech: Speech normal.         Neurologic Exam     Mental Status   Oriented to person, place, and time.   Attention: normal. Concentration: normal.   Speech: speech is normal   Level of consciousness: alert  Knowledge: good.     Cranial Nerves     CN II   Visual fields full to confrontation.     CN III, IV, VI   Pupils are equal, round, and reactive to light.  Extraocular motions are normal.   CN III: no CN III palsy  CN VI: no CN VI palsy    CN V   Facial sensation intact.     CN VII   Facial expression full, symmetric.     CN VIII   CN VIII normal.  "    CN IX, X   CN IX normal.   CN X normal.     CN XI   CN XI normal.     CN XII   CN XII normal.     Motor Exam     Strength   Right neck flexion: 5/5  Left neck flexion: 5/5  Right neck extension: 5/5  Left neck extension: 5/5  Right deltoid: 5/5  Left deltoid: 5/5  Right biceps: 5/5  Left biceps: 5/5  Right triceps: 5/5  Left triceps: 5/5  Right wrist flexion: 5/5  Left wrist flexion: 5/5  Right wrist extension: 5/5  Left wrist extension: 5/5  Right interossei: 5/5  Left interossei: 5/5  Right abdominals: 5/5  Left abdominals: 5/5  Right iliopsoas: 5/5  Left iliopsoas: 5/5  Right quadriceps: 5/5  Left quadriceps: 5/5  Right hamstrin/5  Left hamstrin/5  Right glutei: 5/5  Left glutei: 5/5  Right anterior tibial: 5/5  Left anterior tibial: 5/5  Right posterior tibial: 5/5  Left posterior tibial: 5/5  Right peroneal: 5/5  Left peroneal: 5/5  Right gastroc: 5/5  Left gastroc: 5/5    Sensory Exam   Light touch normal.     Gait, Coordination, and Reflexes     Gait  Gait: normal    Reflexes   Right brachioradialis: 2+  Left brachioradialis: 2+  Right biceps: 2+  Left biceps: 2+  Right triceps: 2+  Left triceps: 2+  Right patellar: 2+  Left patellar: 2+  Right achilles: 2+  Left achilles: 2+  Right : 2+  Left : 2+       Vital Signs:   Vitals:    05/15/23 0909   BP: 134/82   Pulse: 84   SpO2: 97%   Weight: 68.5 kg (151 lb)   Height: 165.1 cm (65\")     Body mass index is 25.13 kg/m².     Results:   Imaging:   No Images in the past 120 days found..     Labs:    No results found for: CMP, PROTEIN, ANTIMOGAB, [id number], JCVRESULT, QUANTTBGOLD, CBCDIF, IGGALBSER     Assessment / Plan      Assessment/Plan:   Diagnoses and all orders for this visit:    1. Trigeminal neuralgia (Primary)  Comments:  Start oxcarbazepine    Other orders  -     OXcarbazepine (TRILEPTAL) 150 MG tablet; Take 1 tablet by mouth 2 (Two) Times a Day.  Dispense: 180 tablet; Refill: 3           Patient Education:   New onset of trigeminal " neuralgia.  We will try oxcarbazepine to see if that will control the episodes.  Reviewed medications, potential side effects and signs and symptoms to report. Discussed risk versus benefits of treatment plan with patient and/or family-including medications, labs and radiology that may be ordered. Addressed questions and concerns during visit. Patient and/or family verbalized understanding and agree with plan. Instructed to call the office with any questions and report to ER with any life-threatening symptoms.     Follow Up:   Return in about 3 months (around 8/15/2023).    I spent 30  minutes face to face with the patient. I personally spent 50 percent of this time counseling and discussing diagnosis, diagnostic testing, treatment options and management .       During this visit the following were done:  Labs Reviewed [x]    Labs Ordered []    Radiology Reports Reviewed []    Radiology Ordered []    PCP Records Reviewed []    Referring Provider Records Reviewed [x]    ER Records Reviewed []    Hospital Records Reviewed []    History Obtained From Family []    Radiology Images Reviewed []    Other Reviewed []    Records Requested []      TAMMY Lawler  [facility] NEUROLOGY [address]  [facility] [address] [phone]

## 2023-05-18 ENCOUNTER — PATIENT ROUNDING (BHMG ONLY) (OUTPATIENT)
Dept: NEUROLOGY | Facility: CLINIC | Age: 58
End: 2023-05-18
Payer: COMMERCIAL

## 2023-05-18 NOTE — PROGRESS NOTES
May 18, 2023    Hello, may I speak with Bhargavi Mohr?    My name is [email or web address]     I am  with [facility]  [address] T[address] [facility] [address]  [facility] [address] [phone].    Before we get started may I verify your date of birth? 1965    I am calling to officially welcome you to our practice and ask about your recent visit. Is this a good time to talk?   Patient rounding sent through [employer]di[employer].

## 2023-08-14 ENCOUNTER — OFFICE VISIT (OUTPATIENT)
Dept: NEUROLOGY | Facility: CLINIC | Age: 58
End: 2023-08-14
Payer: COMMERCIAL

## 2023-08-14 VITALS
HEART RATE: 101 BPM | DIASTOLIC BLOOD PRESSURE: 82 MMHG | HEIGHT: 65 IN | BODY MASS INDEX: 25.09 KG/M2 | SYSTOLIC BLOOD PRESSURE: 128 MMHG | WEIGHT: 150.6 LBS | OXYGEN SATURATION: 97 %

## 2023-08-14 DIAGNOSIS — G50.0 TRIGEMINAL NEURALGIA: Primary | ICD-10-CM

## 2023-08-14 PROCEDURE — 99213 OFFICE O/P EST LOW 20 MIN: CPT | Performed by: NURSE PRACTITIONER

## 2023-08-14 RX ORDER — OXCARBAZEPINE 150 MG/1
150 TABLET, FILM COATED ORAL DAILY
Qty: 90 TABLET | Refills: 3 | Status: SHIPPED | OUTPATIENT
Start: 2023-08-14 | End: 2024-08-13

## 2023-08-14 NOTE — PROGRESS NOTES
Neuro Office Visit      Encounter Date: 2023   Patient Name: Bhargavi Mohr  : 1965   MRN: [id number]   PCP:  Shelly Bauman, TAMMY     Chief Complaint:    Chief Complaint   Patient presents with    Trigeminal Neuralgia     F/U       History of Present Illness: Bhargavi Mohr is a 57 y.o. female who is here today in Neurology for facial pain.    Last visit with me on 5/15/2023, started on oxcarbazepine.    Has had none of the shooting pain since starting oxcarbazepine.    If she puts her head on something hard she will feel the beginning of shooting pain and raise her head up and it will dissipate.    Denies facial pain when exposed to cold or heat.    Taking oxcarbazepine at night which she feels is managing the pain well.    Subjective      Past Medical History:   Past Medical History:   Diagnosis Date    Anxiety     Cluster headache     Diabetes     diet and exercise controlled     Diverticulitis     Headache, tension-type     History of right salpingo-oophorectomy 2018    HL (hearing loss)     Broken bones in right ear    Hyperlipidemia     Controlled with medication    Hypertension     Melanoma     Migraine     PONV (postoperative nausea and vomiting)     Positive TB test     Wears glasses        Past Surgical History:   Past Surgical History:   Procedure Laterality Date    APPENDECTOMY      CERVICAL CONIZATION      COLONOSCOPY  2019 DEC     COLONOSCOPY N/A 2022    Procedure: COLONOSCOPY;  Surgeon: Jordon Cintron MD;  Location:  [facility] ENDOSCOPY;  Service: Gastroenterology;  Laterality: N/A;    ENDOSCOPY      OOPHORECTOMY Right 2018    SKIN CANCER EXCISION      melanoma    TONSILLECTOMY AND ADENOIDECTOMY      TOTAL LAPAROSCOPIC HYSTERECTOMY SALPINGO OOPHORECTOMY Left 2020    Procedure: TOTAL LAPAROSCOPIC HYSTERECTOMY LEFT SALPINGOOPHORECTOMY WITH DAVINCI ROBOT;  Surgeon: Ne Marmolejo MD;  Location:  [facility] OR;  Service: DaVinci;  Laterality: Left;        Family History:   Family History   Problem Relation Age of Onset    Heart disease Mother     Migraines Mother         Stopped in mid-twenties    Stroke Mother     Colon cancer Father     Breast cancer Neg Hx     Ovarian cancer Neg Hx        Social History:   Social History     Socioeconomic History    Marital status:     Number of children: 2   Tobacco Use    Smoking status: Former     Packs/day: 1.00     Years: 20.00     Pack years: 20.00     Types: Cigarettes     Start date: 1986     Quit date: 2006     Years since quittin.0    Smokeless tobacco: Never    Tobacco comments:     No longer use tobacco, but do vape occassionally   Vaping Use    Vaping Use: Every day   Substance and Sexual Activity    Alcohol use: Yes     Comment: Less than one per week.    Drug use: Never    Sexual activity: Yes     Partners: Male     Birth control/protection: Hysterectomy       Medications:     Current Outpatient Medications:     acetaminophen (TYLENOL) 325 MG tablet, Take 2 tablets by mouth Every 6 (Six) Hours As Needed for Mild Pain ., Disp: 60 tablet, Rfl: 2    amLODIPine (NORVASC) 5 MG tablet, Take 1 tablet by mouth Daily., Disp: , Rfl:     cloNIDine (CATAPRES) 0.1 MG tablet, As Needed., Disp: , Rfl:     FLUoxetine (PROzac) 20 MG capsule, Take 1 capsule by mouth Daily., Disp: , Rfl:     levothyroxine (SYNTHROID, LEVOTHROID) 25 MCG tablet, Take 1 tablet by mouth Daily., Disp: , Rfl:     lisinopril (PRINIVIL,ZESTRIL) 30 MG tablet, Take 1 tablet by mouth Daily., Disp: , Rfl:     OXcarbazepine (TRILEPTAL) 150 MG tablet, Take 1 tablet by mouth Daily., Disp: 90 tablet, Rfl: 3    Ozempic, 1 MG/DOSE, 4 MG/3ML solution pen-injector, , Disp: , Rfl:     rosuvastatin (CRESTOR) 40 MG tablet, Take 1 tablet by mouth Daily., Disp: , Rfl:     valACYclovir (VALTREX) 1000 MG tablet, Take 2 tablets by mouth 2 (Two) Times a Day As Needed (as needed for fever blisters)., Disp: , Rfl:     Allergies:   Allergies   Allergen  Reactions    Codeine Dizziness and Delirium     Feels drunk    Percocet [Oxycodone-Acetaminophen] Itching    Adhesive Tape Dermatitis       PHQ-9 Total Score:     PHYLLIS Fall Risk Assessment has not been completed.    Objective     Physical Exam:   Physical Exam  Vitals reviewed.   Eyes:      Extraocular Movements: EOM normal.      Pupils: Pupils are equal, round, and reactive to light.   Neurological:      Mental Status: She is oriented to person, place, and time.      Gait: Gait is intact.   Psychiatric:         Speech: Speech normal.       Neurologic Exam     Mental Status   Oriented to person, place, and time.   Attention: normal. Concentration: normal.   Speech: speech is normal   Level of consciousness: alert  Knowledge: good.     Cranial Nerves     CN II   Visual fields full to confrontation.     CN III, IV, VI   Pupils are equal, round, and reactive to light.  Extraocular motions are normal.   CN III: no CN III palsy  CN VI: no CN VI palsy    CN V   Facial sensation intact.     CN VII   Facial expression full, symmetric.     CN VIII   CN VIII normal.     CN IX, X   CN IX normal.   CN X normal.     CN XI   CN XI normal.     CN XII   CN XII normal.     Motor Exam     Strength   Right neck flexion: 5/5  Left neck flexion: 5/5  Right neck extension: 5/5  Left neck extension: 5/5  Right deltoid: 5/5  Left deltoid: 5/5  Right biceps: 5/5  Left biceps: 5/5  Right triceps: 5/5  Left triceps: 5/5  Right wrist flexion: 5/5  Left wrist flexion: 5/5  Right wrist extension: 5/5  Left wrist extension: 5/5  Right interossei: 5/5  Left interossei: 5/5  Right abdominals: 5/5  Left abdominals: 5/5  Right iliopsoas: 5/5  Left iliopsoas: 5/5  Right quadriceps: 5/5  Left quadriceps: 5/5  Right hamstrin/5  Left hamstrin/5  Right glutei: 5/5  Left glutei: 5/5  Right anterior tibial: 5/5  Left anterior tibial: 5/5  Right posterior tibial: 5/5  Left posterior tibial: 5/5  Right peroneal: 5/5  Left peroneal: 5/5  Right  "gastroc: 5/5  Left gastroc: 5/5    Sensory Exam   Light touch normal.     Gait, Coordination, and Reflexes     Gait  Gait: normal     Vital Signs:   Vitals:    08/14/23 0836   BP: 128/82   Pulse: 101   SpO2: 97%   Weight: 68.3 kg (150 lb 9.6 oz)   Height: 165.1 cm (65\")     Body mass index is 25.06 kg/mý.     Results:   Imaging:   No Images in the past 120 days found..     Labs:    No results found for: CMP, PROTEIN, ANTIMOGAB, UQXPNF2MXTE, JCVRESULT, QUANTTBGOLD, CBCDIF, IGGALBSER     Assessment / Plan      Assessment/Plan:   Diagnoses and all orders for this visit:    1. Trigeminal neuralgia (Primary)  Comments:  Continue OXC    Other orders  -     OXcarbazepine (TRILEPTAL) 150 MG tablet; Take 1 tablet by mouth Daily.  Dispense: 90 tablet; Refill: 3           Patient Education:     Reviewed medications, potential side effects and signs and symptoms to report. Discussed risk versus benefits of treatment plan with patient and/or family-including medications, labs and radiology that may be ordered. Addressed questions and concerns during visit. Patient and/or family verbalized understanding and agree with plan. Instructed to call the office with any questions and report to ER with any life-threatening symptoms.     Follow Up:   Return in about 6 months (around 2/14/2024).    I spent 15 minutes face to face with the patient. I personally spent 50 percent of this time counseling and discussing diagnosis, diagnostic testing, treatment options, and management .       During this visit the following were done:  Labs Reviewed []    Labs Ordered []    Radiology Reports Reviewed []    Radiology Ordered []    PCP Records Reviewed []    Referring Provider Records Reviewed []    ER Records Reviewed []    Hospital Records Reviewed []    History Obtained From Family []    Radiology Images Reviewed []    Other Reviewed []    Records Requested []      TAMMY Lawler  [facility] " NEUROLOGY  [address]  [facility] [id number] [phone]

## 2023-09-20 ENCOUNTER — TRANSCRIBE ORDERS (OUTPATIENT)
Dept: ADMINISTRATIVE | Facility: HOSPITAL | Age: 58
End: 2023-09-20
Payer: COMMERCIAL

## 2023-09-20 DIAGNOSIS — Z12.31 ENCOUNTER FOR SCREENING MAMMOGRAM FOR BREAST CANCER: Primary | ICD-10-CM

## 2023-09-20 DIAGNOSIS — Z13.820 OSTEOPOROSIS SCREENING: ICD-10-CM

## 2024-01-09 ENCOUNTER — HOSPITAL ENCOUNTER (OUTPATIENT)
Dept: MAMMOGRAPHY | Facility: HOSPITAL | Age: 59
Discharge: HOME | End: 2024-01-09
Admitting: NURSE PRACTITIONER
Payer: COMMERCIAL

## 2024-01-09 ENCOUNTER — APPOINTMENT (OUTPATIENT)
Dept: BONE DENSITY | Facility: HOSPITAL | Age: 59
End: 2024-01-09
Payer: COMMERCIAL

## 2024-01-09 DIAGNOSIS — Z12.31 ENCOUNTER FOR SCREENING MAMMOGRAM FOR BREAST CANCER: ICD-10-CM

## 2024-01-09 PROCEDURE — 77067 SCR MAMMO BI INCL CAD: CPT

## 2024-01-09 PROCEDURE — 77063 BREAST TOMOSYNTHESIS BI: CPT

## 2024-02-07 ENCOUNTER — TELEPHONE (OUTPATIENT)
Dept: NEUROLOGY | Facility: CLINIC | Age: 59
End: 2024-02-07
Payer: COMMERCIAL

## 2024-02-07 NOTE — TELEPHONE ENCOUNTER
----- Message from Bhargavi Mohr sent at 2/7/2024  8:14 AM EST -----  Regarding: Appointment Cancellation Request  Contact: [phone]  Bhargavi Mohr would like to cancel the following appointments:    Bridget Jordan in [facility] ([phone]), 2/14/2024  8:30 AM    Comments:  I need to reschedule due to traveling for work.

## 2024-06-10 RX ORDER — OXCARBAZEPINE 150 MG/1
150 TABLET, FILM COATED ORAL 2 TIMES DAILY
Qty: 180 TABLET | OUTPATIENT
Start: 2024-06-10

## 2024-06-10 NOTE — TELEPHONE ENCOUNTER
Rx Refill Note  Requested Prescriptions     Pending Prescriptions Disp Refills    OXcarbazepine (TRILEPTAL) 150 MG tablet [Pharmacy Med Name: OXCARBAZEPINE 150MG TABLETS] 180 tablet      Sig: TAKE 1 TABLET BY MOUTH TWICE DAILY   Pt needs a F/U appt for further refills.    Last filled: 8/14/23, 90 with 3 Refills.   Last office visit with prescribing clinician: 8/14/2023      Next office visit with prescribing clinician: Visit date not found     Patti Poole MA  06/10/24, 15:25 EDT

## 2024-08-06 ENCOUNTER — APPOINTMENT (OUTPATIENT)
Dept: CT IMAGING | Facility: HOSPITAL | Age: 59
End: 2024-08-06
Payer: COMMERCIAL

## 2024-08-06 ENCOUNTER — APPOINTMENT (OUTPATIENT)
Dept: GENERAL RADIOLOGY | Facility: HOSPITAL | Age: 59
End: 2024-08-06
Payer: COMMERCIAL

## 2024-08-06 ENCOUNTER — HOSPITAL ENCOUNTER (EMERGENCY)
Facility: HOSPITAL | Age: 59
Discharge: HOME | End: 2024-08-06
Attending: EMERGENCY MEDICINE | Admitting: EMERGENCY MEDICINE
Payer: COMMERCIAL

## 2024-08-06 VITALS
BODY MASS INDEX: 24.66 KG/M2 | DIASTOLIC BLOOD PRESSURE: 74 MMHG | RESPIRATION RATE: 18 BRPM | SYSTOLIC BLOOD PRESSURE: 130 MMHG | WEIGHT: 148 LBS | TEMPERATURE: 97.7 F | HEIGHT: 65 IN | OXYGEN SATURATION: 97 % | HEART RATE: 71 BPM

## 2024-08-06 DIAGNOSIS — R07.9 CHEST PAIN, UNSPECIFIED TYPE: Primary | ICD-10-CM

## 2024-08-06 DIAGNOSIS — K21.00 GASTROESOPHAGEAL REFLUX DISEASE WITH ESOPHAGITIS WITHOUT HEMORRHAGE: ICD-10-CM

## 2024-08-06 LAB
ALBUMIN SERPL-MCNC: 4.5 G/DL (ref 3.5–5.2)
ALBUMIN/GLOB SERPL: 1.5 G/DL
ALP SERPL-CCNC: 75 U/L (ref 39–117)
ALT SERPL W P-5'-P-CCNC: 31 U/L (ref 1–33)
ANION GAP SERPL CALCULATED.3IONS-SCNC: 8 MMOL/L (ref 5–15)
AST SERPL-CCNC: 25 U/L (ref 1–32)
BASOPHILS # BLD AUTO: 0.03 10*3/MM3 (ref 0–0.2)
BASOPHILS NFR BLD AUTO: 0.5 % (ref 0–1.5)
BILIRUB SERPL-MCNC: 0.3 MG/DL (ref 0–1.2)
BUN SERPL-MCNC: 13 MG/DL (ref 6–20)
BUN/CREAT SERPL: 15.5 (ref 7–25)
CALCIUM SPEC-SCNC: 9.9 MG/DL (ref 8.6–10.5)
CHLORIDE SERPL-SCNC: 102 MMOL/L (ref 98–107)
CO2 SERPL-SCNC: 27 MMOL/L (ref 22–29)
CREAT SERPL-MCNC: 0.84 MG/DL (ref 0.57–1)
DEPRECATED RDW RBC AUTO: 42.8 FL (ref 37–54)
EGFRCR SERPLBLD CKD-EPI 2021: 81.2 ML/MIN/1.73
EOSINOPHIL # BLD AUTO: 0.11 10*3/MM3 (ref 0–0.4)
EOSINOPHIL NFR BLD AUTO: 1.7 % (ref 0.3–6.2)
ERYTHROCYTE [DISTWIDTH] IN BLOOD BY AUTOMATED COUNT: 13.2 % (ref 12.3–15.4)
GEN 5 2HR TROPONIN T REFLEX: <6 NG/L
GLOBULIN UR ELPH-MCNC: 3 GM/DL
GLUCOSE SERPL-MCNC: 111 MG/DL (ref 65–99)
HCT VFR BLD AUTO: 41.2 % (ref 34–46.6)
HGB BLD-MCNC: 13.4 G/DL (ref 12–15.9)
HOLD SPECIMEN: NORMAL
IMM GRANULOCYTES # BLD AUTO: 0.01 10*3/MM3 (ref 0–0.05)
IMM GRANULOCYTES NFR BLD AUTO: 0.2 % (ref 0–0.5)
LIPASE SERPL-CCNC: 202 U/L (ref 13–60)
LYMPHOCYTES # BLD AUTO: 2.09 10*3/MM3 (ref 0.7–3.1)
LYMPHOCYTES NFR BLD AUTO: 32.6 % (ref 19.6–45.3)
MCH RBC QN AUTO: 29.2 PG (ref 26.6–33)
MCHC RBC AUTO-ENTMCNC: 32.5 G/DL (ref 31.5–35.7)
MCV RBC AUTO: 89.8 FL (ref 79–97)
MONOCYTES # BLD AUTO: 0.46 10*3/MM3 (ref 0.1–0.9)
MONOCYTES NFR BLD AUTO: 7.2 % (ref 5–12)
NEUTROPHILS NFR BLD AUTO: 3.72 10*3/MM3 (ref 1.7–7)
NEUTROPHILS NFR BLD AUTO: 57.8 % (ref 42.7–76)
NRBC BLD AUTO-RTO: 0 /100 WBC (ref 0–0.2)
NT-PROBNP SERPL-MCNC: <36 PG/ML (ref 0–900)
PLATELET # BLD AUTO: 239 10*3/MM3 (ref 140–450)
PMV BLD AUTO: 10.1 FL (ref 6–12)
POTASSIUM SERPL-SCNC: 4.3 MMOL/L (ref 3.5–5.2)
PROT SERPL-MCNC: 7.5 G/DL (ref 6–8.5)
QT INTERVAL: 374 MS
QT INTERVAL: 392 MS
QTC INTERVAL: 415 MS
QTC INTERVAL: 425 MS
RBC # BLD AUTO: 4.59 10*6/MM3 (ref 3.77–5.28)
SODIUM SERPL-SCNC: 137 MMOL/L (ref 136–145)
TROPONIN T DELTA: NORMAL
TROPONIN T SERPL HS-MCNC: <6 NG/L
WBC NRBC COR # BLD AUTO: 6.42 10*3/MM3 (ref 3.4–10.8)
WHOLE BLOOD HOLD COAG: NORMAL
WHOLE BLOOD HOLD SPECIMEN: NORMAL

## 2024-08-06 PROCEDURE — 84484 ASSAY OF TROPONIN QUANT: CPT | Performed by: EMERGENCY MEDICINE

## 2024-08-06 PROCEDURE — 80053 COMPREHEN METABOLIC PANEL: CPT | Performed by: EMERGENCY MEDICINE

## 2024-08-06 PROCEDURE — 71045 X-RAY EXAM CHEST 1 VIEW: CPT

## 2024-08-06 PROCEDURE — 36415 COLL VENOUS BLD VENIPUNCTURE: CPT

## 2024-08-06 PROCEDURE — 74177 CT ABD & PELVIS W/CONTRAST: CPT

## 2024-08-06 PROCEDURE — 83880 ASSAY OF NATRIURETIC PEPTIDE: CPT | Performed by: EMERGENCY MEDICINE

## 2024-08-06 PROCEDURE — 93005 ELECTROCARDIOGRAM TRACING: CPT | Performed by: EMERGENCY MEDICINE

## 2024-08-06 PROCEDURE — 85025 COMPLETE CBC W/AUTO DIFF WBC: CPT

## 2024-08-06 PROCEDURE — 83690 ASSAY OF LIPASE: CPT | Performed by: EMERGENCY MEDICINE

## 2024-08-06 PROCEDURE — 99285 EMERGENCY DEPT VISIT HI MDM: CPT

## 2024-08-06 PROCEDURE — 93005 ELECTROCARDIOGRAM TRACING: CPT

## 2024-08-06 PROCEDURE — 25510000001 IOPAMIDOL 61 % SOLUTION: Performed by: EMERGENCY MEDICINE

## 2024-08-06 RX ORDER — ASPIRIN 81 MG/1
324 TABLET, CHEWABLE ORAL ONCE
Status: COMPLETED | OUTPATIENT
Start: 2024-08-06 | End: 2024-08-06

## 2024-08-06 RX ORDER — ALUMINA, MAGNESIA, AND SIMETHICONE 2400; 2400; 240 MG/30ML; MG/30ML; MG/30ML
15 SUSPENSION ORAL ONCE
Status: COMPLETED | OUTPATIENT
Start: 2024-08-06 | End: 2024-08-06

## 2024-08-06 RX ORDER — OMEPRAZOLE 40 MG/1
40 CAPSULE, DELAYED RELEASE ORAL 2 TIMES DAILY
Qty: 60 CAPSULE | Refills: 0 | Status: SHIPPED | OUTPATIENT
Start: 2024-08-06

## 2024-08-06 RX ORDER — SODIUM CHLORIDE 0.9 % (FLUSH) 0.9 %
10 SYRINGE (ML) INJECTION AS NEEDED
Status: DISCONTINUED | OUTPATIENT
Start: 2024-08-06 | End: 2024-08-06 | Stop reason: HOSPADM

## 2024-08-06 RX ORDER — PANTOPRAZOLE SODIUM 40 MG/1
40 TABLET, DELAYED RELEASE ORAL ONCE
Status: COMPLETED | OUTPATIENT
Start: 2024-08-06 | End: 2024-08-06

## 2024-08-06 RX ADMIN — ASPIRIN 81 MG CHEWABLE TABLET 324 MG: 81 TABLET CHEWABLE at 09:19

## 2024-08-06 RX ADMIN — ALUMINUM HYDROXIDE, MAGNESIUM HYDROXIDE, DIMETHICONE 15 ML: 400; 400; 40 SUSPENSION ORAL at 12:29

## 2024-08-06 RX ADMIN — PANTOPRAZOLE SODIUM 40 MG: 40 TABLET, DELAYED RELEASE ORAL at 09:59

## 2024-08-06 RX ADMIN — IOPAMIDOL 85 ML: 612 INJECTION, SOLUTION INTRAVENOUS at 13:00

## 2024-08-06 NOTE — DISCHARGE INSTRUCTIONS
Take your Prilosec twice a day for a week.  If you are feeling better then you may take it once a day.  Call Dr. Duarte and arrange follow-up.  Avoid aspirin and anti-inflammatory such as ibuprofen or Aleve.  Return if worsening discomfort or any concerns.  A nurse from the chest pain clinic will call you and arrange follow-up in the chest pain clinic.  I would also like you to follow-up with your primary care provider.

## 2024-08-06 NOTE — ED PROVIDER NOTES
Subjective   History of Present Illness  Mrs. Mohr presents with chest pain.  It has awakened her for the last 3 nights in a row.  It lasts about 15 minutes.  She denies shortness of breath or nausea with it.  She tells me she used to have to take Prilosec but has been off of it for several months.  She reports that her reflux has been bad lately.  She has hiatal hernia.  She has no known cardiac history.  No first-degree family members with coronary artery disease.  Mom had endocarditis.  She tells me she is having a little bit of pain continuing all morning today.      Review of Systems    Past Medical History:   Diagnosis Date    Anxiety     Cluster headache 1990    Diabetes     diet and exercise controlled     Diverticulitis     Headache, tension-type 1980    History of right salpingo-oophorectomy 12/2018    HL (hearing loss) 2018    Broken bones in right ear    Hyperlipidemia 2000    Controlled with medication    Hypertension     Melanoma     Migraine 1980    PONV (postoperative nausea and vomiting)     Positive TB test     Wears glasses        Allergies   Allergen Reactions    Codeine Dizziness and Delirium     Feels drunk    Percocet [Oxycodone-Acetaminophen] Itching    Adhesive Tape Dermatitis       Past Surgical History:   Procedure Laterality Date    APPENDECTOMY      CERVICAL CONIZATION      COLONOSCOPY  2019 DEC     COLONOSCOPY N/A 07/03/2022    Procedure: COLONOSCOPY;  Surgeon: Jordon Cintron MD;  Location:  [facility];  Service: Gastroenterology;  Laterality: N/A;    ENDOSCOPY      OOPHORECTOMY Right 12/2018    SKIN CANCER EXCISION      melanoma    TONSILLECTOMY AND ADENOIDECTOMY      TOTAL LAPAROSCOPIC HYSTERECTOMY SALPINGO OOPHORECTOMY Left 08/03/2020    Procedure: TOTAL LAPAROSCOPIC HYSTERECTOMY LEFT SALPINGOOPHORECTOMY WITH DAVINCI ROBOT;  Surgeon: Ne Marmolejo MD;  Location:  [facility] OR;  Service: DaVinci;  Laterality: Left;       Family History   Problem Relation Age of Onset    Heart  disease Mother     Migraines Mother         Stopped in mid-twenties    Stroke Mother     Colon cancer Father     Breast cancer Neg Hx     Ovarian cancer Neg Hx        Social History     Socioeconomic History    Marital status:     Number of children: 2   Tobacco Use    Smoking status: Former     Current packs/day: 0.00     Average packs/day: 1 pack/day for 20.0 years (20.0 ttl pk-yrs)     Types: Cigarettes     Start date: 1986     Quit date: 2006     Years since quittin.0    Smokeless tobacco: Never    Tobacco comments:     No longer use tobacco, but do vape occassionally   Vaping Use    Vaping status: Every Day   Substance and Sexual Activity    Alcohol use: Yes     Comment: Less than one per week.    Drug use: Never    Sexual activity: Yes     Partners: Male     Birth control/protection: Hysterectomy           Objective   Physical Exam  Vitals and nursing note reviewed.   Constitutional:       General: She is not in acute distress.     Appearance: Normal appearance.   HENT:      Head: Normocephalic and atraumatic.      Nose: Nose normal. No congestion or rhinorrhea.   Eyes:      General: No scleral icterus.     Conjunctiva/sclera: Conjunctivae normal.   Neck:      Comments: No JVD   Cardiovascular:      Rate and Rhythm: Normal rate and regular rhythm.      Heart sounds: No murmur heard.     No friction rub.   Pulmonary:      Effort: Pulmonary effort is normal.      Breath sounds: Normal breath sounds. No wheezing or rales.   Abdominal:      General: Bowel sounds are normal.      Palpations: Abdomen is soft.      Tenderness: There is no abdominal tenderness. There is no guarding or rebound.   Musculoskeletal:         General: No tenderness.      Cervical back: Normal range of motion and neck supple.      Right lower leg: No edema.      Left lower leg: No edema.   Skin:     General: Skin is warm and dry.      Coloration: Skin is not pale.      Findings: No erythema.   Neurological:       General: No focal deficit present.      Mental Status: She is alert and oriented to person, place, and time.      Motor: No weakness.      Coordination: Coordination normal.   Psychiatric:         Mood and Affect: Mood normal.         Behavior: Behavior normal.         Thought Content: Thought content normal.         Procedures           ED Course  ED Course as of 08/06/24 1415   Tue Aug 06, 2024   1144 First troponin nondetectable.  Lipase is mildly elevated at 202.  Chest x-ray and remainder of labs unremarkable.  Second troponin is in process.  Heart score is 3. [DT]   1154 She does not feel any better after the Protonix.  Awaiting the Maalox.  Is only an occasional drinker.  She does have elevated triglycerides however.  Diagnosis is not certain at this time.  Will obtain CT scan of her pancreas. [DT]   1329 No evidence of pancreatitis on CT scan.  Possibly duodenal ulcer.  I think her chest pain and other symptoms likely are related to GERD and possibly ulcer.  She will need to resume her Prilosec.  Will refer her to gastroenterology. [DT]   1330 Feels better.  Spoke with him about plans for follow-up in the chest pain clinic, resuming Prilosec.  She tells me her gastroenterologist is Dr. Duarte at [facility]. [DT]      ED Course User Index  [DT] Nicholas Nick MD                HEART Score: 3                              Medical Decision Making  Please see course notes.  I ordered and interpreted multiple labs as well as troponins 2 hours apart.  Ordered and interpreted CT scan of her abdomen and pelvis and gave multiple medications and had reevaluation and discussion    Problems Addressed:  Chest pain, unspecified type: complicated acute illness or injury that poses a threat to life or bodily functions  Gastroesophageal reflux disease with esophagitis without hemorrhage: complicated acute illness or injury    Amount and/or Complexity of Data Reviewed  Labs: ordered. Decision-making details documented  in ED Course.  Radiology: ordered. Decision-making details documented in ED Course.  ECG/medicine tests: ordered. Decision-making details documented in ED Course.    Risk  OTC drugs.  Prescription drug management.        Final diagnoses:   Chest pain, unspecified type   Gastroesophageal reflux disease with esophagitis without hemorrhage       ED Disposition  ED Disposition       ED Disposition   Discharge    Condition   Stable    Comment   --               Shelly Bauman, APRN  [address]  [facility] [address]  [phone]          Oly Duarte MD  [address] [facility]  [id number]  [place] [id number]  [phone]          [facility] CARDIOLOGY  [address]  [address]  [facility] [id number] [phone]             Medication List        New Prescriptions      omeprazole 40 MG capsule  Commonly known as: priLOSEC  Take 1 capsule by mouth 2 (Two) Times a Day.               Where to Get Your Medications        These medications were sent to [email or web address][id number] - [address]  AT [facility] [address] & [facility] [phone] [facility] [phone] [address] [address]      Phone: [phone]   omeprazole 40 MG capsule            Nicholas Nick MD  08/06/24 5033

## 2024-08-15 LAB
QT INTERVAL: 374 MS
QT INTERVAL: 392 MS
QTC INTERVAL: 415 MS
QTC INTERVAL: 425 MS

## 2024-08-16 ENCOUNTER — OFFICE VISIT (OUTPATIENT)
Dept: CARDIOLOGY | Facility: HOSPITAL | Age: 59
End: 2024-08-16
Payer: COMMERCIAL

## 2024-08-16 VITALS
BODY MASS INDEX: 25.74 KG/M2 | HEIGHT: 65 IN | WEIGHT: 154.5 LBS | SYSTOLIC BLOOD PRESSURE: 113 MMHG | RESPIRATION RATE: 20 BRPM | HEART RATE: 76 BPM | OXYGEN SATURATION: 99 % | DIASTOLIC BLOOD PRESSURE: 68 MMHG | TEMPERATURE: 97.6 F

## 2024-08-16 DIAGNOSIS — I10 PRIMARY HYPERTENSION: ICD-10-CM

## 2024-08-16 DIAGNOSIS — E78.5 HYPERLIPIDEMIA, UNSPECIFIED HYPERLIPIDEMIA TYPE: ICD-10-CM

## 2024-08-16 DIAGNOSIS — R07.9 CHEST PAIN, UNSPECIFIED TYPE: Primary | ICD-10-CM

## 2024-08-16 DIAGNOSIS — K21.9 GASTROESOPHAGEAL REFLUX DISEASE, UNSPECIFIED WHETHER ESOPHAGITIS PRESENT: ICD-10-CM

## 2024-08-16 RX ORDER — NITROGLYCERIN 0.4 MG/1
0.8 TABLET SUBLINGUAL
OUTPATIENT
Start: 2024-08-16

## 2024-08-16 RX ORDER — METOPROLOL TARTRATE 100 MG/1
200 TABLET ORAL ONCE
OUTPATIENT
Start: 2024-08-16 | End: 2024-08-16

## 2024-08-16 RX ORDER — METOPROLOL TARTRATE 50 MG/1
50 TABLET, FILM COATED ORAL
OUTPATIENT
Start: 2024-08-16

## 2024-08-16 RX ORDER — METOPROLOL TARTRATE 1 MG/ML
5 INJECTION, SOLUTION INTRAVENOUS
OUTPATIENT
Start: 2024-08-16

## 2024-08-16 RX ORDER — METOPROLOL TARTRATE 50 MG/1
50 TABLET, FILM COATED ORAL ONCE
OUTPATIENT
Start: 2024-08-16

## 2024-08-16 RX ORDER — METOPROLOL TARTRATE 50 MG/1
TABLET, FILM COATED ORAL
Qty: 2 TABLET | Refills: 0 | Status: SHIPPED | OUTPATIENT
Start: 2024-08-16

## 2024-08-16 RX ORDER — SODIUM CHLORIDE 0.9 % (FLUSH) 0.9 %
10 SYRINGE (ML) INJECTION AS NEEDED
OUTPATIENT
Start: 2024-08-16

## 2024-08-16 RX ORDER — SODIUM CHLORIDE 9 MG/ML
40 INJECTION, SOLUTION INTRAVENOUS AS NEEDED
OUTPATIENT
Start: 2024-08-16

## 2024-08-16 RX ORDER — SODIUM CHLORIDE 0.9 % (FLUSH) 0.9 %
10 SYRINGE (ML) INJECTION EVERY 12 HOURS SCHEDULED
OUTPATIENT
Start: 2024-08-16

## 2024-08-16 RX ORDER — NITROGLYCERIN 0.4 MG/1
0.4 TABLET SUBLINGUAL
OUTPATIENT
Start: 2024-08-16 | End: 2024-08-16

## 2024-08-16 RX ORDER — METOPROLOL TARTRATE 100 MG/1
100 TABLET ORAL ONCE
OUTPATIENT
Start: 2024-08-16

## 2024-08-16 NOTE — PROGRESS NOTES
"[facility]    Chief Complaint  Chest Pain    Subjective    History of Present Illness {CC  Problem List  Visit  Diagnosis   Encounters  Notes  Medications  Labs  Result Review Imaging  Media :23}     Bhargavi Mohr presents to [facility] CARDIOLOGY for   History of Present Illness     58-year-old female with history of hypothyroidism, hypertension, diabetes type 2, hyperlipidemia, GI bleed (2022), diverticulosis, GERD, hiatal hernia.    Patient presented to [facility] ED on 2024With complaints of chest pain.  She did awaken from sleep with chest pain lasting 15 minutes in duration.  Denies dyspnea, nausea.  Patient had stopped her Prilosec for several months.  Noted increase dyspepsia.  History of hiatal hernia.    Pt also reports she had been without her BP meds for 3 days.     Symptoms improved after given Protonix in the emergency room.  Reported concern for possible ulcer.  Referral completed to gastroenterology.    No further CP or pressure, no dyspnea, dizziness, near syncope, syncope, edema, rare palpitations.     Mother with hx of MI/bypass sx in her 50's.   from endocarditis.     Last ischemic eval 10 yrs.     Pt stopped smoking 14 years ago.  Working on vaping cessation.     Objective     Vital Signs:   Vitals:    24 1408 24 1410   BP: 118/66 113/68   BP Location: Left arm Left arm   Patient Position: Standing Sitting   Cuff Size: Adult Adult   Pulse: 82 76   Resp:  20   Temp:  97.6 °F (36.4 °C)   TempSrc:  Temporal   SpO2: 99% 99%   Weight:  70.1 kg (154 lb 8 oz)   Height:  165.1 cm (65\")     Body mass index is 25.71 kg/m².  Physical Exam  Vitals reviewed.   Constitutional:       General: She is not in acute distress.  Cardiovascular:      Rate and Rhythm: Normal rate and regular rhythm.   Pulmonary:      Effort: Pulmonary effort is normal.      Breath sounds: Normal breath sounds.   Skin:    "  Coloration: Skin is not pale.   Neurological:      Mental Status: She is alert.   Psychiatric:         Mood and Affect: Mood normal.         Behavior: Behavior normal. Behavior is cooperative.              Result Review  Data Reviewed:{ Labs  Result Review  Imaging  Med Tab  Media :23}   EKG 08/06/24:  NSR 71 bpm    CXR 08/06/24:  no acute findings.     Admission on 08/06/2024, Discharged on 08/06/2024   Component Date Value Ref Range Status    QT Interval 08/06/2024 374  ms Final    QTC Interval 08/06/2024 415  ms Final    QT Interval 08/06/2024 392  ms Final    QTC Interval 08/06/2024 425  ms Final    HS Troponin T 08/06/2024 <6  <14 ng/L Final    Glucose 08/06/2024 111 (H)  65 - 99 mg/dL Final    BUN 08/06/2024 13  6 - 20 mg/dL Final    Creatinine 08/06/2024 0.84  0.57 - 1.00 mg/dL Final    Sodium 08/06/2024 137  136 - 145 mmol/L Final    Potassium 08/06/2024 4.3  3.5 - 5.2 mmol/L Final    Chloride 08/06/2024 102  98 - 107 mmol/L Final    CO2 08/06/2024 27.0  22.0 - 29.0 mmol/L Final    Calcium 08/06/2024 9.9  8.6 - 10.5 mg/dL Final    Total Protein 08/06/2024 7.5  6.0 - 8.5 g/dL Final    Albumin 08/06/2024 4.5  3.5 - 5.2 g/dL Final    ALT (SGPT) 08/06/2024 31  1 - 33 U/L Final    AST (SGOT) 08/06/2024 25  1 - 32 U/L Final    Alkaline Phosphatase 08/06/2024 75  39 - 117 U/L Final    Total Bilirubin 08/06/2024 0.3  0.0 - 1.2 mg/dL Final    Globulin 08/06/2024 3.0  gm/dL Final    Calculated Result    A/G Ratio 08/06/2024 1.5  g/dL Final    BUN/Creatinine Ratio 08/06/2024 15.5  7.0 - 25.0 Final    Anion Gap 08/06/2024 8.0  5.0 - 15.0 mmol/L Final    eGFR 08/06/2024 81.2  >60.0 mL/min/1.73 Final    Lipase 08/06/2024 202 (H)  13 - 60 U/L Final    proBNP 08/06/2024 <36.0  0.0 - 900.0 pg/mL Final    Extra Tube 08/06/2024 Hold for add-ons.   Final    Auto resulted.    Extra Tube 08/06/2024 hold for add-on   Final    Auto resulted    Extra Tube 08/06/2024 Hold for add-ons.   Final    Auto resulted.    Extra Tube  08/06/2024 Hold for add-ons.   Final    Auto resulted.    Extra Tube 08/06/2024 Hold for add-ons.   Final    Auto resulted    WBC 08/06/2024 6.42  3.40 - 10.80 10*3/mm3 Final    RBC 08/06/2024 4.59  3.77 - 5.28 10*6/mm3 Final    Hemoglobin 08/06/2024 13.4  12.0 - 15.9 g/dL Final    Hematocrit 08/06/2024 41.2  34.0 - 46.6 % Final    MCV 08/06/2024 89.8  79.0 - 97.0 fL Final    MCH 08/06/2024 29.2  26.6 - 33.0 pg Final    MCHC 08/06/2024 32.5  31.5 - 35.7 g/dL Final    RDW 08/06/2024 13.2  12.3 - 15.4 % Final    RDW-SD 08/06/2024 42.8  37.0 - 54.0 fl Final    MPV 08/06/2024 10.1  6.0 - 12.0 fL Final    Platelets 08/06/2024 239  140 - 450 10*3/mm3 Final    Neutrophil % 08/06/2024 57.8  42.7 - 76.0 % Final    Lymphocyte % 08/06/2024 32.6  19.6 - 45.3 % Final    Monocyte % 08/06/2024 7.2  5.0 - 12.0 % Final    Eosinophil % 08/06/2024 1.7  0.3 - 6.2 % Final    Basophil % 08/06/2024 0.5  0.0 - 1.5 % Final    Immature Grans % 08/06/2024 0.2  0.0 - 0.5 % Final    Neutrophils, Absolute 08/06/2024 3.72  1.70 - 7.00 10*3/mm3 Final    Lymphocytes, Absolute 08/06/2024 2.09  0.70 - 3.10 10*3/mm3 Final    Monocytes, Absolute 08/06/2024 0.46  0.10 - 0.90 10*3/mm3 Final    Eosinophils, Absolute 08/06/2024 0.11  0.00 - 0.40 10*3/mm3 Final    Basophils, Absolute 08/06/2024 0.03  0.00 - 0.20 10*3/mm3 Final    Immature Grans, Absolute 08/06/2024 0.01  0.00 - 0.05 10*3/mm3 Final    nRBC 08/06/2024 0.0  0.0 - 0.2 /100 WBC Final    HS Troponin T 08/06/2024 <6  <14 ng/L Final    Troponin T Delta 08/06/2024    Final    Unable to calculate.                   Assessment and Plan {CC Problem List  Visit Diagnosis  ROS  Review (Popup)  Health Maintenance  Quality  BestPractice  Medications  SmartSets  SnapShot Encounters  Media :23}   1. Chest pain, unspecified type  Patient's chest pain at this time does appear to be more GI in origin.    Although patient does have a significant family history as well as cardiac risk factors, will do  CT angiogram of the coronary arteries for risk factor stratification.    Patient with history of diabetes, hypertension, hyperlipidemia, multiple family members with coronary artery disease.  Mother had cardiac surgery and MI in her 50s.        - CT Angiogram Coronary; Future    - metoprolol tartrate (LOPRESSOR) 50 MG tablet; Take 50 mg at Bedtime the Night Before Coronary CTA Appointment and In the Morning 1 Hour Prior to Coronary CTA Appointment  Dispense: 2 tablet; Refill: 0    Currently on amlodipine as an antianginal, statin.  Cannot tolerate aspirin due to worsening tinnitus.    2. Primary hypertension  Controlled on lisinopril and amlodipine.     3. Hyperlipidemia, unspecified hyperlipidemia type  Statin    4. GERD  Continue PPI  F/u with GI as scheduled.        Follow Up {Instructions Charge Capture  Follow-up Communications :23}   Return in about 6 weeks (around 9/27/2024), or if symptoms worsen or fail to improve.    Patient was given instructions and counseling regarding her condition or for health maintenance advice. Please see specific information pulled into the AVS if appropriate.  Patient was instructed to call the [facility] with any questions, concerns, or worsening symptoms.

## 2024-08-26 ENCOUNTER — TELEPHONE (OUTPATIENT)
Facility: HOSPITAL | Age: 59
End: 2024-08-26
Payer: COMMERCIAL

## 2024-08-26 NOTE — TELEPHONE ENCOUNTER
Pt contacted as pre-procedure phone call prior to planned CTA coronary for 8/27/24. Reviewed with patient arrival time 1330 to main registration, nothing to eat or drink by mouth 4 hours prior to arrival, no caffeine after midnight, please take premedications night before and morning of procedure with a small sip of water as instructed,  recommended,  reviewed procedure instructions and allowed time for questions, and reviewed home medications, allergies, and medical history.

## 2024-08-27 ENCOUNTER — HOSPITAL ENCOUNTER (OUTPATIENT)
Facility: HOSPITAL | Age: 59
Discharge: HOME | End: 2024-08-27
Payer: COMMERCIAL

## 2024-08-27 VITALS
HEART RATE: 64 BPM | RESPIRATION RATE: 16 BRPM | HEIGHT: 65 IN | BODY MASS INDEX: 25.73 KG/M2 | DIASTOLIC BLOOD PRESSURE: 69 MMHG | SYSTOLIC BLOOD PRESSURE: 104 MMHG | OXYGEN SATURATION: 100 % | WEIGHT: 154.43 LBS | TEMPERATURE: 98.7 F

## 2024-08-27 DIAGNOSIS — R07.9 CHEST PAIN, UNSPECIFIED TYPE: ICD-10-CM

## 2024-08-27 LAB — CREAT BLDA-MCNC: 0.8 MG/DL (ref 0.6–1.3)

## 2024-08-27 PROCEDURE — 75574 CT ANGIO HRT W/3D IMAGE: CPT

## 2024-08-27 PROCEDURE — 82565 ASSAY OF CREATININE: CPT | Performed by: NURSE PRACTITIONER

## 2024-08-27 PROCEDURE — 25510000001 IOPAMIDOL PER 1 ML: Performed by: NURSE PRACTITIONER

## 2024-08-27 PROCEDURE — 75574 CT ANGIO HRT W/3D IMAGE: CPT | Performed by: INTERNAL MEDICINE

## 2024-08-27 RX ORDER — IOPAMIDOL 755 MG/ML
100 INJECTION, SOLUTION INTRAVASCULAR
Status: COMPLETED | OUTPATIENT
Start: 2024-08-27 | End: 2024-08-27

## 2024-08-27 RX ORDER — NITROGLYCERIN 0.4 MG/1
0.4 TABLET SUBLINGUAL
Status: COMPLETED | OUTPATIENT
Start: 2024-08-27 | End: 2024-08-27

## 2024-08-27 RX ORDER — METOPROLOL TARTRATE 25 MG/1
25 TABLET, FILM COATED ORAL ONCE
Status: COMPLETED | OUTPATIENT
Start: 2024-08-27 | End: 2024-08-27

## 2024-08-27 RX ORDER — SODIUM CHLORIDE 0.9 % (FLUSH) 0.9 %
10 SYRINGE (ML) INJECTION AS NEEDED
Status: DISCONTINUED | OUTPATIENT
Start: 2024-08-27 | End: 2024-08-28 | Stop reason: HOSPADM

## 2024-08-27 RX ORDER — NITROGLYCERIN 0.4 MG/1
0.8 TABLET SUBLINGUAL
Status: COMPLETED | OUTPATIENT
Start: 2024-08-27 | End: 2024-08-27

## 2024-08-27 RX ORDER — METOPROLOL TARTRATE 100 MG
200 TABLET ORAL ONCE
Status: COMPLETED | OUTPATIENT
Start: 2024-08-27 | End: 2024-08-27

## 2024-08-27 RX ORDER — SODIUM CHLORIDE 9 MG/ML
40 INJECTION, SOLUTION INTRAVENOUS AS NEEDED
Status: DISCONTINUED | OUTPATIENT
Start: 2024-08-27 | End: 2024-08-28 | Stop reason: HOSPADM

## 2024-08-27 RX ORDER — METOPROLOL TARTRATE 1 MG/ML
5 INJECTION, SOLUTION INTRAVENOUS
Status: DISCONTINUED | OUTPATIENT
Start: 2024-08-27 | End: 2024-08-28 | Stop reason: HOSPADM

## 2024-08-27 RX ORDER — IVABRADINE 5 MG/1
15 TABLET, FILM COATED ORAL ONCE
Status: DISCONTINUED | OUTPATIENT
Start: 2024-08-27 | End: 2024-08-28 | Stop reason: HOSPADM

## 2024-08-27 RX ORDER — METOPROLOL TARTRATE 25 MG/1
50 TABLET, FILM COATED ORAL
Status: DISCONTINUED | OUTPATIENT
Start: 2024-08-27 | End: 2024-08-28 | Stop reason: HOSPADM

## 2024-08-27 RX ORDER — METOPROLOL TARTRATE 25 MG/1
50 TABLET, FILM COATED ORAL ONCE
Status: COMPLETED | OUTPATIENT
Start: 2024-08-27 | End: 2024-08-27

## 2024-08-27 RX ORDER — SODIUM CHLORIDE 0.9 % (FLUSH) 0.9 %
10 SYRINGE (ML) INJECTION EVERY 12 HOURS SCHEDULED
Status: DISCONTINUED | OUTPATIENT
Start: 2024-08-27 | End: 2024-08-28 | Stop reason: HOSPADM

## 2024-08-27 RX ORDER — METOPROLOL TARTRATE 100 MG
100 TABLET ORAL ONCE
Status: COMPLETED | OUTPATIENT
Start: 2024-08-27 | End: 2024-08-27

## 2024-08-27 RX ADMIN — IOPAMIDOL 65 ML: 755 INJECTION, SOLUTION INTRAVENOUS at 14:48

## 2024-08-27 RX ADMIN — NITROGLYCERIN 0.8 MG: 0.4 TABLET SUBLINGUAL at 14:31

## 2024-08-27 RX ADMIN — METOPROLOL TARTRATE 50 MG: 25 TABLET, FILM COATED ORAL at 13:52

## (undated) DEVICE — APPL CHLORAPREP TINTED 26ML TEAL

## (undated) DEVICE — HYBRID CO2 TUBING/CAP SET FOR OLYMPUS® SCOPES & CO2 SOURCE: Brand: ERBE

## (undated) DEVICE — TIP COVER ACCESSORY

## (undated) DEVICE — LUBE GEL ENDOGLIDE 1.1OZ

## (undated) DEVICE — KT ORCA ORCAPOD DISP STRL

## (undated) DEVICE — SPNG VERSALON 4X4 4PLY NONSTRL LF BG/200

## (undated) DEVICE — CANNULA,OXY,ADULT,SUPERSOFT,W/7'TUB,UC: Brand: MEDLINE

## (undated) DEVICE — SAFELINER SUCTION CANISTER 1000CC: Brand: DEROYAL

## (undated) DEVICE — SYR LUERLOK 50ML

## (undated) DEVICE — MANIP UTER RUMI 2 KOH EFFICIENT 3CM BL

## (undated) DEVICE — TUBING, SUCTION, 1/4" X 10', STRAIGHT: Brand: MEDLINE

## (undated) DEVICE — PK MAJ GYN DAVINCI 10

## (undated) DEVICE — ANTIBACTERIAL UNDYED BRAIDED (POLYGLACTIN 910), SYNTHETIC ABSORBABLE SUTURE: Brand: COATED VICRYL

## (undated) DEVICE — SHEET, DRAPE, SPLIT, STERILE: Brand: MEDLINE

## (undated) DEVICE — SUT MNCRYL PLS ANTIB UD 3/0 PS2 27IN

## (undated) DEVICE — ANTIBACTERIAL UNDYED BRAIDED (POLYGLACTIN 910), SYNTHETIC ABSORBABLE SURGICAL SUTURE: Brand: COATED VICRYL

## (undated) DEVICE — MANIP UTER RUMI TP 5.1MM 6CM LAV

## (undated) DEVICE — DRP ADAPT ALLY UTER POSTN SYS 1P/U

## (undated) DEVICE — CVR HNDL LIGHT RIGID

## (undated) DEVICE — CONTN GRAD MEAS TRIANG 32OZ BLK

## (undated) DEVICE — CANNULA SEAL

## (undated) DEVICE — SOL IRR H2O BTL 1000ML STRL

## (undated) DEVICE — THE BITE BLOCK MAXI, LATEX FREE STRAP IS USED TO PROTECT THE ENDOSCOPE INSERTION TUBE FROM BEING BITTEN BY THE PATIENT.

## (undated) DEVICE — BLADELESS OBTURATOR: Brand: WECK VISTA

## (undated) DEVICE — ST TBG CONN PNEUMOCLEAR EVAC SMOKE HEAT/HUMID

## (undated) DEVICE — GLV SURG DERMASSURE GRN LF PF SZ 6.5

## (undated) DEVICE — INTRO ACCSR BLNT TP

## (undated) DEVICE — GLV SURG SENSICARE PI MIC PF SZ6 LF STRL

## (undated) DEVICE — PAD ARMBRD SURG CONVOL 7.5X20X2IN

## (undated) DEVICE — INTENDED FOR TISSUE SEPARATION, AND OTHER PROCEDURES THAT REQUIRE A SHARP SURGICAL BLADE TO PUNCTURE OR CUT.: Brand: BARD-PARKER ® STAINLESS STEEL BLADES

## (undated) DEVICE — SKIN AFFIX SURG ADHESIVE 72/CS 0.55ML: Brand: MEDLINE

## (undated) DEVICE — SINGLE-USE BIOPSY FORCEPS: Brand: RADIAL JAW 4

## (undated) DEVICE — ARM DRAPE